# Patient Record
Sex: FEMALE | Race: WHITE | HISPANIC OR LATINO | URBAN - METROPOLITAN AREA
[De-identification: names, ages, dates, MRNs, and addresses within clinical notes are randomized per-mention and may not be internally consistent; named-entity substitution may affect disease eponyms.]

---

## 2021-02-25 ENCOUNTER — INPATIENT (INPATIENT)
Facility: HOSPITAL | Age: 81
LOS: 6 days | Discharge: NURSING, REHAB OR LONG-TERM CARE | DRG: 478 | End: 2021-03-04
Attending: SURGERY | Admitting: SURGERY
Payer: MEDICARE

## 2021-02-25 VITALS
HEART RATE: 90 BPM | RESPIRATION RATE: 18 BRPM | WEIGHT: 110.01 LBS | TEMPERATURE: 99 F | OXYGEN SATURATION: 96 % | DIASTOLIC BLOOD PRESSURE: 87 MMHG | SYSTOLIC BLOOD PRESSURE: 146 MMHG

## 2021-02-25 DIAGNOSIS — S72.001A FRACTURE OF UNSPECIFIED PART OF NECK OF RIGHT FEMUR, INITIAL ENCOUNTER FOR CLOSED FRACTURE: ICD-10-CM

## 2021-02-25 LAB
ALBUMIN SERPL ELPH-MCNC: 4.1 G/DL — SIGNIFICANT CHANGE UP (ref 3.3–5.2)
ALP SERPL-CCNC: 100 U/L — SIGNIFICANT CHANGE UP (ref 40–120)
ALT FLD-CCNC: 19 U/L — SIGNIFICANT CHANGE UP
ANION GAP SERPL CALC-SCNC: 11 MMOL/L — SIGNIFICANT CHANGE UP (ref 5–17)
APTT BLD: 29.2 SEC — SIGNIFICANT CHANGE UP (ref 27.5–35.5)
AST SERPL-CCNC: 31 U/L — SIGNIFICANT CHANGE UP
BASOPHILS # BLD AUTO: 0.12 K/UL — SIGNIFICANT CHANGE UP (ref 0–0.2)
BASOPHILS NFR BLD AUTO: 0.9 % — SIGNIFICANT CHANGE UP (ref 0–2)
BILIRUB SERPL-MCNC: <0.2 MG/DL — LOW (ref 0.4–2)
BUN SERPL-MCNC: 20 MG/DL — SIGNIFICANT CHANGE UP (ref 8–20)
CALCIUM SERPL-MCNC: 9.7 MG/DL — SIGNIFICANT CHANGE UP (ref 8.6–10.2)
CHLORIDE SERPL-SCNC: 106 MMOL/L — SIGNIFICANT CHANGE UP (ref 98–107)
CO2 SERPL-SCNC: 22 MMOL/L — SIGNIFICANT CHANGE UP (ref 22–29)
CREAT SERPL-MCNC: 0.65 MG/DL — SIGNIFICANT CHANGE UP (ref 0.5–1.3)
EOSINOPHIL # BLD AUTO: 0 K/UL — SIGNIFICANT CHANGE UP (ref 0–0.5)
EOSINOPHIL NFR BLD AUTO: 0 % — SIGNIFICANT CHANGE UP (ref 0–6)
GLUCOSE SERPL-MCNC: 125 MG/DL — HIGH (ref 70–99)
HCT VFR BLD CALC: 43.3 % — SIGNIFICANT CHANGE UP (ref 34.5–45)
HGB BLD-MCNC: 14 G/DL — SIGNIFICANT CHANGE UP (ref 11.5–15.5)
INR BLD: 1.02 RATIO — SIGNIFICANT CHANGE UP (ref 0.88–1.16)
LYMPHOCYTES # BLD AUTO: 0.57 K/UL — LOW (ref 1–3.3)
LYMPHOCYTES # BLD AUTO: 4.3 % — LOW (ref 13–44)
MANUAL SMEAR VERIFICATION: SIGNIFICANT CHANGE UP
MCHC RBC-ENTMCNC: 29.9 PG — SIGNIFICANT CHANGE UP (ref 27–34)
MCHC RBC-ENTMCNC: 32.3 GM/DL — SIGNIFICANT CHANGE UP (ref 32–36)
MCV RBC AUTO: 92.3 FL — SIGNIFICANT CHANGE UP (ref 80–100)
MONOCYTES # BLD AUTO: 0.68 K/UL — SIGNIFICANT CHANGE UP (ref 0–0.9)
MONOCYTES NFR BLD AUTO: 5.2 % — SIGNIFICANT CHANGE UP (ref 2–14)
MYELOCYTES NFR BLD: 0.9 % — HIGH (ref 0–0)
NEUTROPHILS # BLD AUTO: 11.56 K/UL — HIGH (ref 1.8–7.4)
NEUTROPHILS NFR BLD AUTO: 87.8 % — HIGH (ref 43–77)
PLAT MORPH BLD: NORMAL — SIGNIFICANT CHANGE UP
PLATELET # BLD AUTO: 218 K/UL — SIGNIFICANT CHANGE UP (ref 150–400)
POTASSIUM SERPL-MCNC: 3.9 MMOL/L — SIGNIFICANT CHANGE UP (ref 3.5–5.3)
POTASSIUM SERPL-SCNC: 3.9 MMOL/L — SIGNIFICANT CHANGE UP (ref 3.5–5.3)
PROT SERPL-MCNC: 7.1 G/DL — SIGNIFICANT CHANGE UP (ref 6.6–8.7)
PROTHROM AB SERPL-ACNC: 11.8 SEC — SIGNIFICANT CHANGE UP (ref 10.6–13.6)
RBC # BLD: 4.69 M/UL — SIGNIFICANT CHANGE UP (ref 3.8–5.2)
RBC # FLD: 13.2 % — SIGNIFICANT CHANGE UP (ref 10.3–14.5)
RBC BLD AUTO: NORMAL — SIGNIFICANT CHANGE UP
SARS-COV-2 RNA SPEC QL NAA+PROBE: SIGNIFICANT CHANGE UP
SODIUM SERPL-SCNC: 139 MMOL/L — SIGNIFICANT CHANGE UP (ref 135–145)
VARIANT LYMPHS # BLD: 0.9 % — SIGNIFICANT CHANGE UP (ref 0–6)
WBC # BLD: 13.17 K/UL — HIGH (ref 3.8–10.5)
WBC # FLD AUTO: 13.17 K/UL — HIGH (ref 3.8–10.5)

## 2021-02-25 PROCEDURE — 73502 X-RAY EXAM HIP UNI 2-3 VIEWS: CPT | Mod: 26,RT

## 2021-02-25 PROCEDURE — 71045 X-RAY EXAM CHEST 1 VIEW: CPT | Mod: 26

## 2021-02-25 PROCEDURE — 73552 X-RAY EXAM OF FEMUR 2/>: CPT | Mod: 26,RT

## 2021-02-25 PROCEDURE — 99221 1ST HOSP IP/OBS SF/LOW 40: CPT | Mod: AI,GC

## 2021-02-25 PROCEDURE — 99285 EMERGENCY DEPT VISIT HI MDM: CPT

## 2021-02-25 PROCEDURE — 99221 1ST HOSP IP/OBS SF/LOW 40: CPT

## 2021-02-25 PROCEDURE — 93010 ELECTROCARDIOGRAM REPORT: CPT

## 2021-02-25 RX ORDER — MORPHINE SULFATE 50 MG/1
2 CAPSULE, EXTENDED RELEASE ORAL ONCE
Refills: 0 | Status: DISCONTINUED | OUTPATIENT
Start: 2021-02-25 | End: 2021-02-25

## 2021-02-25 RX ORDER — SODIUM CHLORIDE 9 MG/ML
3 INJECTION INTRAMUSCULAR; INTRAVENOUS; SUBCUTANEOUS ONCE
Refills: 0 | Status: COMPLETED | OUTPATIENT
Start: 2021-02-25 | End: 2021-02-25

## 2021-02-25 RX ORDER — MORPHINE SULFATE 50 MG/1
2 CAPSULE, EXTENDED RELEASE ORAL EVERY 6 HOURS
Refills: 0 | Status: DISCONTINUED | OUTPATIENT
Start: 2021-02-25 | End: 2021-02-25

## 2021-02-25 RX ORDER — MORPHINE SULFATE 50 MG/1
4 CAPSULE, EXTENDED RELEASE ORAL ONCE
Refills: 0 | Status: DISCONTINUED | OUTPATIENT
Start: 2021-02-25 | End: 2021-02-25

## 2021-02-25 RX ORDER — IBUPROFEN 200 MG
600 TABLET ORAL ONCE
Refills: 0 | Status: COMPLETED | OUTPATIENT
Start: 2021-02-25 | End: 2021-02-25

## 2021-02-25 RX ORDER — TRAMADOL HYDROCHLORIDE 50 MG/1
50 TABLET ORAL EVERY 4 HOURS
Refills: 0 | Status: DISCONTINUED | OUTPATIENT
Start: 2021-02-25 | End: 2021-02-28

## 2021-02-25 RX ORDER — AMLODIPINE BESYLATE 2.5 MG/1
5 TABLET ORAL DAILY
Refills: 0 | Status: DISCONTINUED | OUTPATIENT
Start: 2021-02-25 | End: 2021-03-04

## 2021-02-25 RX ORDER — SENNA PLUS 8.6 MG/1
2 TABLET ORAL AT BEDTIME
Refills: 0 | Status: DISCONTINUED | OUTPATIENT
Start: 2021-02-25 | End: 2021-03-04

## 2021-02-25 RX ORDER — ACETAMINOPHEN 500 MG
650 TABLET ORAL ONCE
Refills: 0 | Status: COMPLETED | OUTPATIENT
Start: 2021-02-25 | End: 2021-02-25

## 2021-02-25 RX ORDER — NICOTINE POLACRILEX 2 MG
1 GUM BUCCAL DAILY
Refills: 0 | Status: DISCONTINUED | OUTPATIENT
Start: 2021-02-25 | End: 2021-02-27

## 2021-02-25 RX ORDER — SODIUM CHLORIDE 9 MG/ML
1000 INJECTION, SOLUTION INTRAVENOUS
Refills: 0 | Status: DISCONTINUED | OUTPATIENT
Start: 2021-02-25 | End: 2021-02-28

## 2021-02-25 RX ORDER — SIMVASTATIN 20 MG/1
20 TABLET, FILM COATED ORAL AT BEDTIME
Refills: 0 | Status: DISCONTINUED | OUTPATIENT
Start: 2021-02-25 | End: 2021-03-04

## 2021-02-25 RX ORDER — ACETAMINOPHEN 500 MG
650 TABLET ORAL EVERY 6 HOURS
Refills: 0 | Status: DISCONTINUED | OUTPATIENT
Start: 2021-02-25 | End: 2021-03-04

## 2021-02-25 RX ORDER — ONDANSETRON 8 MG/1
4 TABLET, FILM COATED ORAL EVERY 6 HOURS
Refills: 0 | Status: DISCONTINUED | OUTPATIENT
Start: 2021-02-25 | End: 2021-03-04

## 2021-02-25 RX ORDER — MORPHINE SULFATE 50 MG/1
2 CAPSULE, EXTENDED RELEASE ORAL EVERY 6 HOURS
Refills: 0 | Status: DISCONTINUED | OUTPATIENT
Start: 2021-02-25 | End: 2021-03-01

## 2021-02-25 RX ORDER — ENOXAPARIN SODIUM 100 MG/ML
30 INJECTION SUBCUTANEOUS ONCE
Refills: 0 | Status: COMPLETED | OUTPATIENT
Start: 2021-02-25 | End: 2021-02-25

## 2021-02-25 RX ORDER — ATENOLOL 25 MG/1
100 TABLET ORAL DAILY
Refills: 0 | Status: DISCONTINUED | OUTPATIENT
Start: 2021-02-25 | End: 2021-03-04

## 2021-02-25 RX ORDER — LIDOCAINE 4 G/100G
1 CREAM TOPICAL DAILY
Refills: 0 | Status: DISCONTINUED | OUTPATIENT
Start: 2021-02-25 | End: 2021-03-04

## 2021-02-25 RX ORDER — TRAMADOL HYDROCHLORIDE 50 MG/1
25 TABLET ORAL EVERY 4 HOURS
Refills: 0 | Status: DISCONTINUED | OUTPATIENT
Start: 2021-02-25 | End: 2021-02-25

## 2021-02-25 RX ADMIN — SODIUM CHLORIDE 100 MILLILITER(S): 9 INJECTION, SOLUTION INTRAVENOUS at 22:23

## 2021-02-25 RX ADMIN — ENOXAPARIN SODIUM 30 MILLIGRAM(S): 100 INJECTION SUBCUTANEOUS at 22:25

## 2021-02-25 RX ADMIN — Medication 650 MILLIGRAM(S): at 23:39

## 2021-02-25 RX ADMIN — SODIUM CHLORIDE 3 MILLILITER(S): 9 INJECTION INTRAMUSCULAR; INTRAVENOUS; SUBCUTANEOUS at 20:59

## 2021-02-25 RX ADMIN — MORPHINE SULFATE 2 MILLIGRAM(S): 50 CAPSULE, EXTENDED RELEASE ORAL at 20:58

## 2021-02-25 NOTE — H&P ADULT - HISTORY OF PRESENT ILLNESS
Patient is a 80 F with traumatic fall down 2 stairs at home. Landed on her right side and denies head trauma and LOC. Patient reports she was walking down her stairs and her shoes slipped causing her to spin over and fall. Denies hitting her head. Was unable to ambulate after the fall and thus son brought her to hospital. Pain at right hip when she moves her leg, otherwise no pain anywhere    A: Protected, patient conversing   B: CTAB. Symmetrical chest rise  C: 2+ central (femoral) & peripheral pulses (Radial, DP)  D: GCS 15, MAEO, interacting. No poncho disability noted  E: No gross deformities on primary exposure    Vitals:  Temp: 38.7  HR: 90 BP: 146/87 RR: 18    CXR: Negative for evidence of hemo/pneumothorax

## 2021-02-25 NOTE — ED ADULT NURSE NOTE - NSIMPLEMENTINTERV_GEN_ALL_ED
Implemented All Fall with Harm Risk Interventions:  [place] to call system. Call bell, personal items and telephone within reach. Instruct patient to call for assistance. Room bathroom lighting operational. Non-slip footwear when patient is off stretcher. Physically safe environment: no spills, clutter or unnecessary equipment. Stretcher in lowest position, wheels locked, appropriate side rails in place. Provide visual cue, wrist band, yellow gown, etc. Monitor gait and stability. Monitor for mental status changes and reorient to person, place, and time. Review medications for side effects contributing to fall risk. Reinforce activity limits and safety measures with patient and family. Provide visual clues: red socks.

## 2021-02-25 NOTE — H&P ADULT - NSHPPHYSICALEXAM_GEN_ALL_CORE
Constitutional: Well-developed well nourished Female in no acute distress  HEENT: Head is normocephalic and atraumatic, maxillofacial structures stable, no blood or discharge from nares or oral cavity, no lemus sign / racoon eyes, EOMI b/l, pupils [4]mm round and reactive to light b/l, no active drainage or redness  Neck: midline neck without swelling or tenderness  Respiratory: Breath sounds CTA b/l respirations are unlabored, no accessory muscle use, no conversational dyspnea  Cardiovascular: Regular rate & rhythm, +S1, S1, Chest wall is non-tender to palpation, no subQ emphysema or crepitus palpated  Gastrointestinal: Abdomen soft, non-tender, non-distended, no rebound tenderness / guarding, no ecchymosis or external signs of abdominal trauma  Musculoskeletal: moving all extremities spontaneously, tenderness of right hip without deformity noted to upper or lower extremities b/l  Pelvis: stable  Vascular: 2+ radial, femoral, and DP pulses b/l  Neurological: GCS: 15 (4/5/6). A&O x 3; no gross sensory / motor / coordination deficits  Musculoskeletal: 5/5 LUE/RUE/LLE. decreased ROM of LLE due to pain. but distal motor function intact 5/5  Neuropsinal: no C/T/LS spine tenderness to palpation, no step-offs or signs of external trauma to the back

## 2021-02-25 NOTE — H&P ADULT - NSHPLABSRESULTS_GEN_ALL_CORE
LABS:                          14.0   13.17 )-----------( 218      ( 25 Feb 2021 21:12 )             43.3     02-25    139  |  106  |  20.0  ----------------------------<  125<H>  3.9   |  22.0  |  0.65    Ca    9.7      25 Feb 2021 21:12    TPro  7.1  /  Alb  4.1  /  TBili  <0.2<L>  /  DBili  x   /  AST  31  /  ALT  19  /  AlkPhos  100  02-25    PT/INR - ( 25 Feb 2021 21:12 )   PT: 11.8 sec;   INR: 1.02 ratio         PTT - ( 25 Feb 2021 21:12 )  PTT:29.2 sec

## 2021-02-25 NOTE — CONSULT NOTE ADULT - SUBJECTIVE AND OBJECTIVE BOX
Pt Name: SOY TOVAR    MRN: [id number]      Patient is a 80y Female presenting to the emergency department with a chief complaint of right groin pain s/p mech fall at home, on ASA 81 daily. Pt was going down stairs with box of shoes, fell and landed on right side. Pt unable to bear weight after fall. Pt denies head trauma or LOC. Pt denies motorsensory changes distally. No other orthopedic complaints.    REVIEW OF SYSTEMS    General: Alert, responsive, in NAD    Skin/Breast: No rashes, no pruritis   	  Ophthalmologic: No visual changes. No redness.   	  ENMT:	No discharge. No swelling.    Respiratory and Thorax: No difficulty breathing. No cough.  	   Cardiovascular:	No chest pain. No palpitations.    Gastrointestinal:	 No abdominal pain. No diarrhea.     Genitourinary: No dysuria. No bleeding.    Musculoskeletal: SEE HPI.    Neurological: No sensory or motor changes.     Psychiatric: No anxiety or depression.    Hematology/Lymphatics: No swelling.    Endocrine: No Hx of diabetes.    ROS is otherwise negative.    PAST MEDICAL & SURGICAL HISTORY:  PAST MEDICAL & SURGICAL HISTORY:      Allergies: sulfa drugs (Unknown)      Medications:     FAMILY HISTORY:  : non-contributory    Social History:                         14.0   13.17 )-----------( 218      ( 25 Feb 2021 21:12 )             43.3       02-25    139  |  106  |  20.0  ----------------------------<  125<H>  3.9   |  22.0  |  0.65    Ca    9.7      25 Feb 2021 21:12    TPro  7.1  /  Alb  4.1  /  TBili  <0.2<L>  /  DBili  x   /  AST  31  /  ALT  19  /  AlkPhos  100  02-25      Vital Signs Last 24 Hrs  T(C): 37.1 (25 Feb 2021 19:35), Max: 37.1 (25 Feb 2021 19:35)  T(F): 98.7 (25 Feb 2021 19:35), Max: 98.7 (25 Feb 2021 19:35)  HR: 90 (25 Feb 2021 19:35) (90 - 90)  BP: 146/87 (25 Feb 2021 19:35) (146/87 - 146/87)  BP(mean): --  RR: 18 (25 Feb 2021 19:35) (18 - 18)  SpO2: 96% (25 Feb 2021 19:35) (96% - 96%)    Daily     Daily       PHYSICAL EXAM:      Appearance: Alert, responsive, in no acute distress.  Neurological: Sensation is grossly intact to light touch.   Skin: no rash on visible skin. Skin is clean, dry and intact. No bleeding. No abrasions. No ulcerations.  Vascular: 2+ distal pulses. Cap refill < 2 sec. No cyanosis. calf NT b/l  Right Lower Extremity: no obvious deformity. skin intact, no ecchymosis, +TTP right hip, +plantardorsiflexion, +ROM toes, BCR, no cyanosis, DP2+    A/P:  Pt is a  80y Female with found to have right hip IT fx    PLAN:   * NPO for OR tomorrow with Dr Owens  * IV fluids ordered and to start once NPO  * Pre-operative ABX ordered  * Bed rest  * Admit to Trauma team

## 2021-02-25 NOTE — ED PROVIDER NOTE - OBJECTIVE STATEMENT
Pt is a 81 y/o F w/PMHx HTN, HLD presents c/o fall.  Pt states she was walking down stairs and lost her footing at the last two steps.  She landed on her right side, denies LOC, denies hitting head.  Was able to crawl back upstairs and call her son.  Pt denies headache, chest pain, shortness of breath, abdominal pain.

## 2021-02-25 NOTE — ED ADULT NURSE NOTE - OBJECTIVE STATEMENT
Pt presents to ED s/p fall down 2 steps from a standing height. Pt c/o pain to R leg/hip area, +PMS in affected extremity noted. Pt reports allergy to ASA. Pt denies other complaints at this time.

## 2021-02-25 NOTE — ED PROVIDER NOTE - ATTENDING CONTRIBUTION TO CARE
81 yo F presents to ED via EMS s/p fall down 2 steps c/o R hip/groin pain.  Pt unable to walk or bear weight since fall.  No head injury, LOC or neck pain.  Pt denies any assoc CP, SOB, abd pain or syncope.  on exam pt awake and alert, appears uncomfortable, NC/AT, PERRL, throat clear mm moist, Neck supple, FROM, Cor Reg, lungs clear b/l, abd soft, NT, pelvis stable, RLE with exquisite pain on AROM, no shortening or ext rotation, NVI, + distal pulses, Neuro non-focal.  Pt most likely with hip/pelvis fx.  Will medicate for pain, check x-rays, ? CT and re-eval

## 2021-02-25 NOTE — H&P ADULT - ATTENDING COMMENTS
Pt seen and examined by me  agree with findings  Ortho consulted  CT A/P pending  admit  multimodal pain mgmt

## 2021-02-25 NOTE — ED ADULT NURSE NOTE - NSSUHOSCREENINGYN_ED_ALL_ED
Pt called back and states he was buying Viagara online 100mg with a quantity of 12.  Pt states he just had an OV with you and would like to see if he can get script from you.  pls advise. Thank you   Yes - the patient is able to be screened

## 2021-02-25 NOTE — H&P ADULT - ASSESSMENT
80 year old female s/p fall with right hip fracture with plan for orthopedic repair in AM.  - Admit to trauma surgery  - Pre-op labs and anesthesia clearance  - Ortho following with plan for OR in the morning  - Lovenox tonight, then hold for OR  - NPO at midnight, IVF  - resume home medications as indicated

## 2021-02-25 NOTE — ED ADULT TRIAGE NOTE - CHIEF COMPLAINT QUOTE
PT presents to ED s/p mechanical fall down 2 steps. Pain to RLE.  Denies hitting head or LOC. on 81mg ASA

## 2021-02-25 NOTE — ED PROVIDER NOTE - PHYSICAL EXAMINATION
General: well appearing, interactive, well nourished, NAD  HEENT: pupils equal and reactive, normal external ears bilaterally   Cardiac: RRR, no MRG appreciated  Resp: lungs clear to auscultation bilaterally, symmetric chest wall rise  Abd: soft, nontender, nondistended,   : no CVA tenderness  Neuro: Moving all extremities  Skin:  normal color for race  MSK: RLE active ROM limited due to pain, passive ROM to 40, no leg length discrepancy

## 2021-02-25 NOTE — ED PROVIDER NOTE - NS ED ROS FT
CONSTITUTIONAL: No fevers, no chills  Eyes: No vision changes  Cardiovascular: No Chest pain  Respiratory: No SOB  Gastrointestinal: No n/v/c/d, no abd pain  Genitourinary: no dysuria, no hematuria  SKIN: no rashes.  MSK: RLE pain  NEURO: no headache, no weakness, no numbness  PSYCHIATRIC: no SI/HI

## 2021-02-26 ENCOUNTER — TRANSCRIPTION ENCOUNTER (OUTPATIENT)
Age: 81
End: 2021-02-26

## 2021-02-26 LAB
A1C WITH ESTIMATED AVERAGE GLUCOSE RESULT: 5.5 % — SIGNIFICANT CHANGE UP (ref 4–5.6)
ABO RH CONFIRMATION: SIGNIFICANT CHANGE UP
ANION GAP SERPL CALC-SCNC: 10 MMOL/L — SIGNIFICANT CHANGE UP (ref 5–17)
BLD GP AB SCN SERPL QL: SIGNIFICANT CHANGE UP
BUN SERPL-MCNC: 18 MG/DL — SIGNIFICANT CHANGE UP (ref 8–20)
CALCIUM SERPL-MCNC: 9.6 MG/DL — SIGNIFICANT CHANGE UP (ref 8.6–10.2)
CHLORIDE SERPL-SCNC: 106 MMOL/L — SIGNIFICANT CHANGE UP (ref 98–107)
CO2 SERPL-SCNC: 27 MMOL/L — SIGNIFICANT CHANGE UP (ref 22–29)
CREAT SERPL-MCNC: 0.82 MG/DL — SIGNIFICANT CHANGE UP (ref 0.5–1.3)
ESTIMATED AVERAGE GLUCOSE: 111 MG/DL — SIGNIFICANT CHANGE UP (ref 68–114)
GLUCOSE SERPL-MCNC: 109 MG/DL — HIGH (ref 70–99)
HCT VFR BLD CALC: 39.7 % — SIGNIFICANT CHANGE UP (ref 34.5–45)
HGB BLD-MCNC: 12.8 G/DL — SIGNIFICANT CHANGE UP (ref 11.5–15.5)
MCHC RBC-ENTMCNC: 30 PG — SIGNIFICANT CHANGE UP (ref 27–34)
MCHC RBC-ENTMCNC: 32.2 GM/DL — SIGNIFICANT CHANGE UP (ref 32–36)
MCV RBC AUTO: 93 FL — SIGNIFICANT CHANGE UP (ref 80–100)
PLATELET # BLD AUTO: 200 K/UL — SIGNIFICANT CHANGE UP (ref 150–400)
POTASSIUM SERPL-MCNC: 4.2 MMOL/L — SIGNIFICANT CHANGE UP (ref 3.5–5.3)
POTASSIUM SERPL-SCNC: 4.2 MMOL/L — SIGNIFICANT CHANGE UP (ref 3.5–5.3)
RBC # BLD: 4.27 M/UL — SIGNIFICANT CHANGE UP (ref 3.8–5.2)
RBC # FLD: 13.3 % — SIGNIFICANT CHANGE UP (ref 10.3–14.5)
SARS-COV-2 IGM SERPL IA-ACNC: 0.07 INDEX — SIGNIFICANT CHANGE UP
SODIUM SERPL-SCNC: 143 MMOL/L — SIGNIFICANT CHANGE UP (ref 135–145)
WBC # BLD: 8.05 K/UL — SIGNIFICANT CHANGE UP (ref 3.8–10.5)
WBC # FLD AUTO: 8.05 K/UL — SIGNIFICANT CHANGE UP (ref 3.8–10.5)

## 2021-02-26 PROCEDURE — 93306 TTE W/DOPPLER COMPLETE: CPT | Mod: 26

## 2021-02-26 PROCEDURE — 99232 SBSQ HOSP IP/OBS MODERATE 35: CPT

## 2021-02-26 PROCEDURE — 74176 CT ABD & PELVIS W/O CONTRAST: CPT | Mod: 26

## 2021-02-26 RX ORDER — CEFAZOLIN SODIUM 1 G
2000 VIAL (EA) INJECTION ONCE
Refills: 0 | Status: DISCONTINUED | OUTPATIENT
Start: 2021-02-26 | End: 2021-02-27

## 2021-02-26 RX ORDER — LOSARTAN POTASSIUM 100 MG/1
100 TABLET, FILM COATED ORAL DAILY
Refills: 0 | Status: DISCONTINUED | OUTPATIENT
Start: 2021-02-26 | End: 2021-03-04

## 2021-02-26 RX ORDER — ENOXAPARIN SODIUM 100 MG/ML
40 INJECTION SUBCUTANEOUS ONCE
Refills: 0 | Status: COMPLETED | OUTPATIENT
Start: 2021-02-26 | End: 2021-02-26

## 2021-02-26 RX ADMIN — Medication 1 PATCH: at 12:24

## 2021-02-26 RX ADMIN — Medication 1 PATCH: at 19:42

## 2021-02-26 RX ADMIN — SIMVASTATIN 20 MILLIGRAM(S): 20 TABLET, FILM COATED ORAL at 21:49

## 2021-02-26 RX ADMIN — Medication 650 MILLIGRAM(S): at 05:27

## 2021-02-26 RX ADMIN — LIDOCAINE 1 PATCH: 4 CREAM TOPICAL at 21:42

## 2021-02-26 RX ADMIN — LIDOCAINE 1 PATCH: 4 CREAM TOPICAL at 23:28

## 2021-02-26 RX ADMIN — SENNA PLUS 2 TABLET(S): 8.6 TABLET ORAL at 21:49

## 2021-02-26 RX ADMIN — LIDOCAINE 1 PATCH: 4 CREAM TOPICAL at 12:23

## 2021-02-26 RX ADMIN — TRAMADOL HYDROCHLORIDE 50 MILLIGRAM(S): 50 TABLET ORAL at 03:05

## 2021-02-26 RX ADMIN — Medication 650 MILLIGRAM(S): at 17:49

## 2021-02-26 RX ADMIN — TRAMADOL HYDROCHLORIDE 50 MILLIGRAM(S): 50 TABLET ORAL at 17:49

## 2021-02-26 RX ADMIN — SODIUM CHLORIDE 100 MILLILITER(S): 9 INJECTION, SOLUTION INTRAVENOUS at 12:25

## 2021-02-26 RX ADMIN — ATENOLOL 100 MILLIGRAM(S): 25 TABLET ORAL at 05:27

## 2021-02-26 RX ADMIN — AMLODIPINE BESYLATE 5 MILLIGRAM(S): 2.5 TABLET ORAL at 05:27

## 2021-02-26 RX ADMIN — ENOXAPARIN SODIUM 40 MILLIGRAM(S): 100 INJECTION SUBCUTANEOUS at 18:48

## 2021-02-26 NOTE — PROGRESS NOTE ADULT - SUBJECTIVE AND OBJECTIVE BOX
INTERVAL HPI/OVERNIGHT EVENTS:    Patient seen this AM with no acute events overnight. Patient without any acute complaints at this time. Patients' pain is well controlled on current pain regiment. Remains NPO for OR today. Remains hemodynamically stable and denies fevers, chills. No CP or SOB       MEDICATIONS  (STANDING):  acetaminophen   Tablet .. 650 milliGRAM(s) Oral every 6 hours  amLODIPine   Tablet 5 milliGRAM(s) Oral daily  ATENolol  Tablet 100 milliGRAM(s) Oral daily  lidocaine   Patch 1 Patch Transdermal daily  multiple electrolytes Injection Type 1 1000 milliLiter(s) (100 mL/Hr) IV Continuous <Continuous>  nicotine   7 mG/24 Hr(s) Transdermal Patch -  Peds 1 Patch Transdermal daily  senna 2 Tablet(s) Oral at bedtime  simvastatin 20 milliGRAM(s) Oral at bedtime    MEDICATIONS  (PRN):  morphine  - Injectable 2 milliGRAM(s) IV Push every 6 hours PRN Breakthrough pain  ondansetron Injectable 4 milliGRAM(s) IV Push every 6 hours PRN Nausea  traMADol 25 milliGRAM(s) Oral every 4 hours PRN Moderate Pain (4 - 6)  traMADol 50 milliGRAM(s) Oral every 4 hours PRN Severe Pain (7 - 10)      Vital Signs Last 24 Hrs  T(C): 36.8 (26 Feb 2021 04:14), Max: 37.1 (25 Feb 2021 19:35)  T(F): 98.3 (26 Feb 2021 04:14), Max: 98.7 (25 Feb 2021 19:35)  HR: 74 (26 Feb 2021 04:14) (74 - 90)  BP: 160/68 (26 Feb 2021 04:14) (129/69 - 160/68)  BP(mean): --  RR: 18 (26 Feb 2021 04:14) (18 - 18)  SpO2: 94% (26 Feb 2021 04:14) (94% - 96%)    Physical Exam:  GEN: NAD, laying in bed, appears stated age  HEENT: NCAT, clear oral mucosa, normal conjunctiva  Chest: non-tender  CV:  non-tachycardic, 2+ radial pulse  Pulm: no increased work of breathing, no conversational dyspnea  GI: soft, non-tender  MSK: moving all extremities with pain on movement of RLE. no deformity. tender to palpation      I&O's Detail      LABS:                        14.0   13.17 )-----------( 218      ( 25 Feb 2021 21:12 )             43.3     02-25    139  |  106  |  20.0  ----------------------------<  125<H>  3.9   |  22.0  |  0.65    Ca    9.7      25 Feb 2021 21:12    TPro  7.1  /  Alb  4.1  /  TBili  <0.2<L>  /  DBili  x   /  AST  31  /  ALT  19  /  AlkPhos  100  02-25    PT/INR - ( 25 Feb 2021 21:12 )   PT: 11.8 sec;   INR: 1.02 ratio         PTT - ( 25 Feb 2021 21:12 )  PTT:29.2 sec      RADIOLOGY & ADDITIONAL STUDIES:

## 2021-02-26 NOTE — PRE PROCEDURE NOTE - PRE PROCEDURE EVALUATION
Preoperative Note    Preop Dx: Right intertrochanteric hip fracture  Surgeon: Sharif Owens MD  Procedure: TBD, but likely R hip hemiarthroplasty    Vital Signs Last 24 Hrs  T(C): 36.9 (26 Feb 2021 07:15), Max: 37.1 (25 Feb 2021 19:35)  T(F): 98.4 (26 Feb 2021 07:15), Max: 98.7 (25 Feb 2021 19:35)  HR: 59 (26 Feb 2021 07:15) (59 - 90)  BP: 165/67 (26 Feb 2021 07:15) (129/69 - 165/67)  BP(mean): --  RR: 19 (26 Feb 2021 07:15) (18 - 19)  SpO2: 96% (26 Feb 2021 07:15) (94% - 96%)                        12.8   8.05  )-----------( 200      ( 26 Feb 2021 07:08 )             39.7     02-26    143  |  106  |  18.0  ----------------------------<  109<H>  4.2   |  27.0  |  0.82    Ca    9.6      26 Feb 2021 07:08    TPro  7.1  /  Alb  4.1  /  TBili  <0.2<L>  /  DBili  x   /  AST  31  /  ALT  19  /  AlkPhos  100  02-25    PT/INR - ( 25 Feb 2021 21:12 )   PT: 11.8 sec;   INR: 1.02 ratio         PTT - ( 25 Feb 2021 21:12 )  PTT:29.2 sec  Daily     Daily     EKG: Reviewed on admission; no acute findings  CXR: Reviewed on admission; no gross abnormalities  Type and Screen: Drawn 2/26        A/P: 80y Female w/PMH of HTN and HLD, with smoking history, s/p fall down stairs with right intertrochanteric hip fx, to OR today with Orthopedic service.    - OR 2/26/21 for likely right hip hemiarthroplasty with Dr. Sharif Owens  - NPO past midnight, except medications  - IVF while NPO  - Morning Labs: CBC, BMP, coags, type & screen  - Cleared for OR by Trauma, Cardiology, Anethesia

## 2021-02-26 NOTE — CONSULT NOTE ADULT - SUBJECTIVE AND OBJECTIVE BOX
[facility]                                                    [address] [facility], [address], [place] [address]                                                         PHONE: [phone]    FAX: [phone] [address] [facility], [address], [place] [address]                                                 PHONE: [phone]    FAX: [phone]  *******************************************************************************  cc: pre op    HPI: 80F with mechanical fall and right femur intertochanteric fracture. Pt denies active chest pain.  There is no SOB. No palpitations, PND or orthopnea. No dizziness or syncope reported. No fever, chills or constitutional symptoms.      Overnight events/Subjective Assessment:    INTERPRETATION OF TELEMETRY (personally reviewed):    PAST MEDICAL & SURGICAL HISTORY:      penicillins (Unknown)  sulfa drugs (Unknown)      MEDICATIONS  (STANDING):  acetaminophen   Tablet .. 650 milliGRAM(s) Oral every 6 hours  amLODIPine   Tablet 5 milliGRAM(s) Oral daily  ATENolol  Tablet 100 milliGRAM(s) Oral daily  lidocaine   Patch 1 Patch Transdermal daily  multiple electrolytes Injection Type 1 1000 milliLiter(s) (100 mL/Hr) IV Continuous <Continuous>  nicotine   7 mG/24 Hr(s) Transdermal Patch -  Peds 1 Patch Transdermal daily  senna 2 Tablet(s) Oral at bedtime  simvastatin 20 milliGRAM(s) Oral at bedtime    MEDICATIONS  (PRN):  morphine  - Injectable 2 milliGRAM(s) IV Push every 6 hours PRN Breakthrough pain  ondansetron Injectable 4 milliGRAM(s) IV Push every 6 hours PRN Nausea  traMADol 25 milliGRAM(s) Oral every 4 hours PRN Moderate Pain (4 - 6)  traMADol 50 milliGRAM(s) Oral every 4 hours PRN Severe Pain (7 - 10)      Vital Signs Last 24 Hrs  T(C): 36.8 (26 Feb 2021 04:14), Max: 37.1 (25 Feb 2021 19:35)  T(F): 98.3 (26 Feb 2021 04:14), Max: 98.7 (25 Feb 2021 19:35)  HR: 74 (26 Feb 2021 04:14) (74 - 90)  BP: 160/68 (26 Feb 2021 04:14) (129/69 - 160/68)  BP(mean): --  RR: 18 (26 Feb 2021 04:14) (18 - 18)  SpO2: 94% (26 Feb 2021 04:14) (94% - 96%)    I&O's Detail    I&O's Summary          PHYSICAL EXAM:  General: Appears well developed, well nourished, no acute distress. not in acute pain  HEAD: normal cephalic. Atraumatic  PUPILS: equal and reactive to light  EARS: normal hearing  NECK: supple. no JVD or HJR. no carotid bruits. no visible lymphadenopathy  NOSE: no gross abnormalities  CHEST: symmetric chest wall expansion  CARDIOVASCULAR: Normal rate. Regular rhythm. Normal S1 and S2, no S3/S4,  no murmur, rub, or gallop  LUNGS: Normal effort. Normal respiratory rate. Breath sounds are clear to auscultation bilaterally. No respiratory distress. No stridor.  no rales, rhonchi or wheeze. no decreased Breath sounds  ABDOMEN: Soft, nontender, non-distended, positive bowel sounds, no mass or bruit. no abdominal tenderness. No rebound. no ascites  EXTREMITIES: No clubbing, cyanosis or edema. normal range of motion  PULSES:  distal pulses WNL  SKIN: Warm and dry with normal turgor. no visible rash or cyanosis   NEURO: Alert & oriented x 3, grossly intact with no focal weakness  PSYCH: normal mood and affect. Grossly normal insight and judgement exhibited    FAMILY HISTORY:      SOCIAL HISTORY:   active smoking. No ETOH/No IVDA    REVIEW OF SYSTEMS:  Constitutional: no fever, chills or malaise. No weight loss  Head: no trauma  Eyes: no visual deficit. No double vision  Ears: no hearing deficit or ringing in the ears  Nose: no nose bleeds or smell changes or congestion  Throat: no difficult swallowing or painful swallowing  Neck: supple. No lymphadenopathy or swelling  Respiratory: no SOB, wheeze, asthma, COPD. No cough. No blood in the sputum  Cardiovascular: no CP, palpitations, irregular heart beats. No edema. No PND. No orthopnea. No skin/temperature or color changes  Gastrointestinal: no abdominal pain. No constipation. No diarrhea. No melena. No nausea. No vomiting. No bloating  Genitourinary: no frequency or urgency. No hematuria  Lymphatics: no grossly swollen lymph nodes  Musculoskeletal: no limitation of range of motion. Normal strength. No pain  Integumentary: no visible rash. No itching  Neurologic: no HA. No TIA or stroke symptoms. No seizure. No hx of epilepsy. No tingling or numbness. No weakness. No dizziness  Psychiatric: denied. Reports appropriate mood.        LABS:                        14.0   13.17 )-----------( 218      ( 25 Feb 2021 21:12 )             43.3     02-25    139  |  106  |  20.0  ----------------------------<  125<H>  3.9   |  22.0  |  0.65    Ca    9.7      25 Feb 2021 21:12    TPro  7.1  /  Alb  4.1  /  TBili  <0.2<L>  /  DBili  x   /  AST  31  /  ALT  19  /  AlkPhos  100  02-25        PT/INR - ( 25 Feb 2021 21:12 )   PT: 11.8 sec;   INR: 1.02 ratio         PTT - ( 25 Feb 2021 21:12 )  PTT:29.2 sec  serum  Lipids:         RADIOLOGY & ADDITIONAL STUDIES:    ECG: SR 60 LAD LAE no acute changes    < from: CT Abdomen and Pelvis No Cont (02.26.21 @ 01:52) >  INTERPRETATION:  Right femur intertrochanteric fracture. No evidence of acute intra-abdominal/pelvic injury; limited by lack of iv contrast.    < end of copied text >      ASSESSMENT AND PLAN:  In summary, SOY TOVAR is a 80y Female with past medical history significant for       Jessica Flynn MD                                                               [facility], [facility]                                                    [address] [facility] [address], [address], [place] [address]                                                         PHONE: [phone]    FAX: [phone] [address] [facility] [address], [address], [place] [address]                                                 PHONE: [phone]    FAX: [phone]  *******************************************************************************  cc: pre op    HPI: 80F with mechanical fall down 2 cellar stairs with right femur intertochanteric fracture. Pt denies LOC. Pt denies head trauma. Pt reports slip and fall.  Pt denies active chest pain.  There is no SOB. No palpitations, PND or orthopnea. No dizziness or syncope reported. No fever, chills or constitutional symptoms. Pt is an active smoker with HTN and HL. Recent negative ischemic evaluation. No documented CAD. No hx of coronary stents or coronary interventions.    - Exercise treadmill stress test 12/11/20 Deconditioned  exercise response (4:39 Matthew protocol) achieving an adequate HR with no evidence of stress induced ischemia  - Echo 12/10/20 EF 70-75%. diastolic dysfunction, mild MR/TR  - Carotid 12/10/20 16-49% bilaterally    Overnight events/Subjective Assessment: right hip pain      PAST MEDICAL & SURGICAL HISTORY:      penicillins (Unknown)  sulfa drugs (Unknown)      MEDICATIONS  (STANDING):  acetaminophen   Tablet .. 650 milliGRAM(s) Oral every 6 hours  amLODIPine   Tablet 5 milliGRAM(s) Oral daily  ATENolol  Tablet 100 milliGRAM(s) Oral daily  lidocaine   Patch 1 Patch Transdermal daily  multiple electrolytes Injection Type 1 1000 milliLiter(s) (100 mL/Hr) IV Continuous <Continuous>  nicotine   7 mG/24 Hr(s) Transdermal Patch -  Peds 1 Patch Transdermal daily  senna 2 Tablet(s) Oral at bedtime  simvastatin 20 milliGRAM(s) Oral at bedtime    MEDICATIONS  (PRN):  morphine  - Injectable 2 milliGRAM(s) IV Push every 6 hours PRN Breakthrough pain  ondansetron Injectable 4 milliGRAM(s) IV Push every 6 hours PRN Nausea  traMADol 25 milliGRAM(s) Oral every 4 hours PRN Moderate Pain (4 - 6)  traMADol 50 milliGRAM(s) Oral every 4 hours PRN Severe Pain (7 - 10)      Vital Signs Last 24 Hrs  T(C): 36.8 (26 Feb 2021 04:14), Max: 37.1 (25 Feb 2021 19:35)  T(F): 98.3 (26 Feb 2021 04:14), Max: 98.7 (25 Feb 2021 19:35)  HR: 74 (26 Feb 2021 04:14) (74 - 90)  BP: 160/68 (26 Feb 2021 04:14) (129/69 - 160/68)  BP(mean): --  RR: 18 (26 Feb 2021 04:14) (18 - 18)  SpO2: 94% (26 Feb 2021 04:14) (94% - 96%)    I&O's Detail    I&O's Summary          PHYSICAL EXAM:  General: Appears well developed, well nourished, no acute distress. not in acute pain  HEAD: normal cephalic. Atraumatic  PUPILS: equal and reactive to light  EARS: normal hearing  NECK: supple. no JVD or HJR. no carotid bruits. no visible lymphadenopathy  NOSE: no gross abnormalities  CHEST: symmetric chest wall expansion  CARDIOVASCULAR: Normal rate. Regular rhythm. Normal S1 and S2, no S3/S4,  no murmur, rub, or gallop  LUNGS: Normal effort. Normal respiratory rate. Breath sounds are clear to auscultation bilaterally. No respiratory distress. No stridor.  no rales, rhonchi or wheeze. no decreased Breath sounds  ABDOMEN: Soft, nontender, non-distended, positive bowel sounds, no mass or bruit. no abdominal tenderness. No rebound. no ascites  EXTREMITIES: No clubbing, cyanosis or edema. normal range of motion  PULSES:  distal pulses WNL  SKIN: Warm and dry with normal turgor. no visible rash or cyanosis   NEURO: Alert & oriented x 3, grossly intact with no focal weakness  PSYCH: normal mood and affect. Grossly normal insight and judgement exhibited    FAMILY HISTORY: Pt denies family hx of ischemic heart disease for mother, father or 1st degree relatives      SOCIAL HISTORY:   active smoking. No ETOH/No IVDA    REVIEW OF SYSTEMS:  Constitutional: no fever, chills or malaise. No weight loss  Head: no trauma  Eyes: no visual deficit. No double vision  Ears: no hearing deficit or ringing in the ears  Nose: no nose bleeds or smell changes or congestion  Throat: no difficult swallowing or painful swallowing  Neck: supple. No lymphadenopathy or swelling  Respiratory: no SOB, wheeze, asthma, COPD. No cough. No blood in the sputum  Cardiovascular: no CP, palpitations, irregular heart beats. No edema. No PND. No orthopnea. No skin/temperature or color changes  Gastrointestinal: no abdominal pain. No constipation. No diarrhea. No melena. No nausea. No vomiting. No bloating  Genitourinary: no frequency or urgency. No hematuria  Lymphatics: no grossly swollen lymph nodes  Musculoskeletal: no limitation of range of motion. Normal strength. + right hip pain  Integumentary: no visible rash. No itching  Neurologic: no HA. No TIA or stroke symptoms. No seizure. No hx of epilepsy. No tingling or numbness. No weakness. No dizziness  Psychiatric: denied. Reports appropriate mood.        LABS:                        14.0   13.17 )-----------( 218      ( 25 Feb 2021 21:12 )             43.3     02-25    139  |  106  |  20.0  ----------------------------<  125<H>  3.9   |  22.0  |  0.65    Ca    9.7      25 Feb 2021 21:12    TPro  7.1  /  Alb  4.1  /  TBili  <0.2<L>  /  DBili  x   /  AST  31  /  ALT  19  /  AlkPhos  100  02-25        PT/INR - ( 25 Feb 2021 21:12 )   PT: 11.8 sec;   INR: 1.02 ratio         PTT - ( 25 Feb 2021 21:12 )  PTT:29.2 sec  serum  Lipids:         RADIOLOGY & ADDITIONAL STUDIES:    ECG: SR 60 LAD LAE no acute changes    < from: CT Abdomen and Pelvis No Cont (02.26.21 @ 01:52) >  INTERPRETATION:  Right femur intertrochanteric fracture. No evidence of acute intra-abdominal/pelvic injury; limited by lack of iv contrast.    < end of copied text >      ASSESSMENT AND PLAN:  In summary, SOY TOVAR is a 80y Female with past medical history significant for mechanical fall down 2 cellar stairs with right femur intertochanteric fracture. Pt denies LOC. Pt denies head trauma. Pt reports slip and fall.  Pt denies active chest pain.  There is no SOB. No palpitations, PND or orthopnea. No dizziness or syncope reported. No fever, chills or constitutional symptoms. Pt is an active smoker with HTN and HL. Recent negative ischemic evaluation. No documented CAD. No hx of coronary stents or coronary interventions.    - Exercise treadmill stress test 12/11/20 Deconditioned  exercise response (4:39 Matthew protocol) achieving an adequate HR with no evidence of stress induced ischemia  - Echo 12/10/20 EF 70-75%. diastolic dysfunction, mild MR/TR  - Carotid 12/10/20 16-49% bilaterally  - No active CP or sx to suggest ACS or hemodynamic compromise. Normal LV function. Recent negative ischemic evaluation. There is no clear cut cardiac contraindication to proceeding with the proposed procedure from the cardiac standpoint and pt is cleared to proceed  - HTN. BP is uncontrolled.  Maintain outpt dosing of atenolol 100mg daily, losartan 100mg daily and amlodipine 5mg daily. Pt currently on atenolol and amlodipine in house. Will resume outpt dosing of losartan. Pain may also be driving elevation in BP  - HL. simvastatin 20mg daily is maintained as an outpt  - Bilateral carotid disease. MIld to moderate carotid disease. Resume ASA post procedure and maintain simvastatin. Outpt fu of carotid disease via our office.  - Nicotine dependence. Active smoker. Smoking cessation has been dw the patient. Pt is on nicotine patch in hospital.  - Pain management as per primary team.  - ECG personally reviewed by me. no acute ECG changes  - radiologic imaging reviewed  - Laboratory data reviewed.  - I have personally reviewed all obtainable prior records and data/office records  - Please reconsult PRN if any new CV issues should arise    Thank you for allowing me to participate in the care of your patient    Jessica Flynn MD                                                               [facility], [facility]                                                    [address], [address], [place] [address]                                                         PHONE: [phone]    FAX: [phone] [address], [address], [place] [address]                                                 PHONE: [phone]    FAX: [phone]  *******************************************************************************  cc: pre op    HPI: 80F with mechanical fall down 2 cellar stairs with right femur intertochanteric fracture. Pt denies LOC. Pt denies head trauma. Pt reports slip and fall.  Pt denies active chest pain.  There is no SOB. No palpitations, PND or orthopnea. No dizziness or syncope reported. No fever, chills or constitutional symptoms. Pt is an active smoker with HTN and HL. Recent negative ischemic evaluation. No documented CAD. No hx of coronary stents or coronary interventions.    - Exercise treadmill stress test 12/11/20 Deconditioned  exercise response (4:39 Matthew protocol) achieving an adequate HR with no evidence of stress induced ischemia  - Echo 12/10/20 EF 70-75%. diastolic dysfunction, mild MR/TR  - Carotid 12/10/20 16-49% bilaterally    Overnight events/Subjective Assessment: right hip pain      PAST MEDICAL & SURGICAL HISTORY:      penicillins (Unknown)  sulfa drugs (Unknown)      MEDICATIONS  (STANDING):  acetaminophen   Tablet .. 650 milliGRAM(s) Oral every 6 hours  amLODIPine   Tablet 5 milliGRAM(s) Oral daily  ATENolol  Tablet 100 milliGRAM(s) Oral daily  lidocaine   Patch 1 Patch Transdermal daily  multiple electrolytes Injection Type 1 1000 milliLiter(s) (100 mL/Hr) IV Continuous <Continuous>  nicotine   7 mG/24 Hr(s) Transdermal Patch -  Peds 1 Patch Transdermal daily  senna 2 Tablet(s) Oral at bedtime  simvastatin 20 milliGRAM(s) Oral at bedtime    MEDICATIONS  (PRN):  morphine  - Injectable 2 milliGRAM(s) IV Push every 6 hours PRN Breakthrough pain  ondansetron Injectable 4 milliGRAM(s) IV Push every 6 hours PRN Nausea  traMADol 25 milliGRAM(s) Oral every 4 hours PRN Moderate Pain (4 - 6)  traMADol 50 milliGRAM(s) Oral every 4 hours PRN Severe Pain (7 - 10)      Vital Signs Last 24 Hrs  T(C): 36.8 (26 Feb 2021 04:14), Max: 37.1 (25 Feb 2021 19:35)  T(F): 98.3 (26 Feb 2021 04:14), Max: 98.7 (25 Feb 2021 19:35)  HR: 74 (26 Feb 2021 04:14) (74 - 90)  BP: 160/68 (26 Feb 2021 04:14) (129/69 - 160/68)  BP(mean): --  RR: 18 (26 Feb 2021 04:14) (18 - 18)  SpO2: 94% (26 Feb 2021 04:14) (94% - 96%)    I&O's Detail    I&O's Summary          PHYSICAL EXAM:  General: Appears well developed, well nourished, no acute distress. not in acute pain  HEAD: normal cephalic. Atraumatic  PUPILS: equal and reactive to light  EARS: normal hearing  NECK: supple. no JVD or HJR. no carotid bruits. no visible lymphadenopathy  NOSE: no gross abnormalities  CHEST: symmetric chest wall expansion  CARDIOVASCULAR: Normal rate. Regular rhythm. Normal S1 and S2, no S3/S4,  no murmur, rub, or gallop  LUNGS: Normal effort. Normal respiratory rate. Breath sounds are clear to auscultation bilaterally. No respiratory distress. No stridor.  no rales, rhonchi or wheeze. no decreased Breath sounds  ABDOMEN: Soft, nontender, non-distended, positive bowel sounds, no mass or bruit. no abdominal tenderness. No rebound. no ascites  EXTREMITIES: No clubbing, cyanosis or edema. normal range of motion  PULSES:  distal pulses WNL  SKIN: Warm and dry with normal turgor. no visible rash or cyanosis   NEURO: Alert & oriented x 3, grossly intact with no focal weakness  PSYCH: normal mood and affect. Grossly normal insight and judgement exhibited    FAMILY HISTORY: Pt denies family hx of ischemic heart disease for mother, father or 1st degree relatives      SOCIAL HISTORY:   active smoking. No ETOH/No IVDA    REVIEW OF SYSTEMS:  Constitutional: no fever, chills or malaise. No weight loss  Head: no trauma  Eyes: no visual deficit. No double vision  Ears: no hearing deficit or ringing in the ears  Nose: no nose bleeds or smell changes or congestion  Throat: no difficult swallowing or painful swallowing  Neck: supple. No lymphadenopathy or swelling  Respiratory: no SOB, wheeze, asthma, COPD. No cough. No blood in the sputum  Cardiovascular: no CP, palpitations, irregular heart beats. No edema. No PND. No orthopnea. No skin/temperature or color changes  Gastrointestinal: no abdominal pain. No constipation. No diarrhea. No melena. No nausea. No vomiting. No bloating  Genitourinary: no frequency or urgency. No hematuria  Lymphatics: no grossly swollen lymph nodes  Musculoskeletal: no limitation of range of motion. Normal strength. + right hip pain  Integumentary: no visible rash. No itching  Neurologic: no HA. No TIA or stroke symptoms. No seizure. No hx of epilepsy. No tingling or numbness. No weakness. No dizziness  Psychiatric: denied. Reports appropriate mood.        LABS:                        14.0   13.17 )-----------( 218      ( 25 Feb 2021 21:12 )             43.3     02-25    139  |  106  |  20.0  ----------------------------<  125<H>  3.9   |  22.0  |  0.65    Ca    9.7      25 Feb 2021 21:12    TPro  7.1  /  Alb  4.1  /  TBili  <0.2<L>  /  DBili  x   /  AST  31  /  ALT  19  /  AlkPhos  100  02-25        PT/INR - ( 25 Feb 2021 21:12 )   PT: 11.8 sec;   INR: 1.02 ratio         PTT - ( 25 Feb 2021 21:12 )  PTT:29.2 sec  serum  Lipids:         RADIOLOGY & ADDITIONAL STUDIES:    ECG: SR 60 LAD LAE no acute changes    < from: CT Abdomen and Pelvis No Cont (02.26.21 @ 01:52) >  INTERPRETATION:  Right femur intertrochanteric fracture. No evidence of acute intra-abdominal/pelvic injury; limited by lack of iv contrast.    < end of copied text >      ASSESSMENT AND PLAN:  In summary, SOY TOVAR is a 80y Female with past medical history significant for mechanical fall down 2 cellar stairs with right femur intertochanteric fracture. Pt denies LOC. Pt denies head trauma. Pt reports slip and fall.  Pt denies active chest pain.  There is no SOB. No palpitations, PND or orthopnea. No dizziness or syncope reported. No fever, chills or constitutional symptoms. Pt is an active smoker with HTN and HL. Recent negative ischemic evaluation. No documented CAD. No hx of coronary stents or coronary interventions.    - Exercise treadmill stress test 12/11/20 Deconditioned  exercise response (4:39 Matthew protocol) achieving an adequate HR with no evidence of stress induced ischemia  - Echo 12/10/20 EF 70-75%. diastolic dysfunction, mild MR/TR  - Carotid 12/10/20 16-49% bilaterally  - No active CP or sx to suggest ACS or hemodynamic compromise. Normal LV function. Recent negative ischemic evaluation. There is no clear cut cardiac contraindication to proceeding with the proposed procedure from the cardiac standpoint and pt is cleared to proceed  - HTN. BP is uncontrolled.  Maintain outpt dosing of atenolol 100mg daily, losartan 100mg daily and amlodipine 5mg daily. Pt currently on atenolol and amlodipine in house. Will resume outpt dosing of losartan. Pain may also be driving elevation in BP  - HL. simvastatin 20mg daily is maintained as an outpt  - Bilateral carotid disease. MIld to moderate carotid disease. Resume ASA post procedure and maintain simvastatin. Outpt fu of carotid disease via our office.  - Nicotine dependence. Active smoker. Smoking cessation has been dw the patient. Pt is on nicotine patch in hospital.  - Pain management as per primary team.  - DVT prophylaxis as per primary team  - ECG personally reviewed by me. no acute ECG changes  - radiologic imaging reviewed  - Laboratory data reviewed.  - I have personally reviewed all obtainable prior records and data/office records  - Please reconsult PRN if any new CV issues should arise    Thank you for allowing me to participate in the care of your patient    Jessica Flynn MD                                                               [facility], [facility]                                                    [address], [address], [place] [address]                                                         PHONE: [phone]    FAX: [phone] [address], [address], [place] [address]                                                 PHONE: [phone]    FAX: [phone]  *******************************************************************************  cc: pre op    HPI: 80F with mechanical fall down 2 cellar stairs with right femur intertochanteric fracture. Pt denies LOC. Pt denies head trauma. Pt reports slip and fall.  Pt denies active chest pain.  There is no SOB. No palpitations, PND or orthopnea. No dizziness or syncope reported. No fever, chills or constitutional symptoms. Pt is an active smoker with HTN and HL. Recent negative ischemic evaluation. No documented CAD. No hx of coronary stents or coronary interventions.    - Exercise treadmill stress test 12/11/20 Deconditioned  exercise response (4:39 Matthew protocol) achieving an adequate HR with no evidence of stress induced ischemia  - Echo 12/10/20 EF 70-75%. diastolic dysfunction, mild MR/TR  - Carotid 12/10/20 16-49% bilaterally    Overnight events/Subjective Assessment: right hip pain      PAST MEDICAL & SURGICAL HISTORY: HTN, HL, bilateral carotid disease      penicillins (Unknown)  sulfa drugs (Unknown)      MEDICATIONS  (STANDING):  acetaminophen   Tablet .. 650 milliGRAM(s) Oral every 6 hours  amLODIPine   Tablet 5 milliGRAM(s) Oral daily  ATENolol  Tablet 100 milliGRAM(s) Oral daily  lidocaine   Patch 1 Patch Transdermal daily  multiple electrolytes Injection Type 1 1000 milliLiter(s) (100 mL/Hr) IV Continuous <Continuous>  nicotine   7 mG/24 Hr(s) Transdermal Patch -  Peds 1 Patch Transdermal daily  senna 2 Tablet(s) Oral at bedtime  simvastatin 20 milliGRAM(s) Oral at bedtime    MEDICATIONS  (PRN):  morphine  - Injectable 2 milliGRAM(s) IV Push every 6 hours PRN Breakthrough pain  ondansetron Injectable 4 milliGRAM(s) IV Push every 6 hours PRN Nausea  traMADol 25 milliGRAM(s) Oral every 4 hours PRN Moderate Pain (4 - 6)  traMADol 50 milliGRAM(s) Oral every 4 hours PRN Severe Pain (7 - 10)      Vital Signs Last 24 Hrs  T(C): 36.8 (26 Feb 2021 04:14), Max: 37.1 (25 Feb 2021 19:35)  T(F): 98.3 (26 Feb 2021 04:14), Max: 98.7 (25 Feb 2021 19:35)  HR: 74 (26 Feb 2021 04:14) (74 - 90)  BP: 160/68 (26 Feb 2021 04:14) (129/69 - 160/68)  BP(mean): --  RR: 18 (26 Feb 2021 04:14) (18 - 18)  SpO2: 94% (26 Feb 2021 04:14) (94% - 96%)    I&O's Detail    I&O's Summary          PHYSICAL EXAM:  General: Appears well developed, well nourished, no acute distress. not in acute pain  HEAD: normal cephalic. Atraumatic  PUPILS: equal and reactive to light  EARS: normal hearing  NECK: supple. no JVD or HJR. no carotid bruits. no visible lymphadenopathy  NOSE: no gross abnormalities  CHEST: symmetric chest wall expansion  CARDIOVASCULAR: Normal rate. Regular rhythm. Normal S1 and S2, no S3/S4,  no murmur, rub, or gallop  LUNGS: Normal effort. Normal respiratory rate. Breath sounds are clear to auscultation bilaterally. No respiratory distress. No stridor.  no rales, rhonchi or wheeze. no decreased Breath sounds  ABDOMEN: Soft, nontender, non-distended, positive bowel sounds, no mass or bruit. no abdominal tenderness. No rebound. no ascites  EXTREMITIES: No clubbing, cyanosis or edema. normal range of motion  PULSES:  distal pulses WNL  SKIN: Warm and dry with normal turgor. no visible rash or cyanosis   NEURO: Alert & oriented x 3, grossly intact with no focal weakness  PSYCH: normal mood and affect. Grossly normal insight and judgement exhibited    FAMILY HISTORY: Pt denies family hx of ischemic heart disease for mother, father or 1st degree relatives      SOCIAL HISTORY:   active smoking. No ETOH/No IVDA    REVIEW OF SYSTEMS:  Constitutional: no fever, chills or malaise. No weight loss  Head: no trauma  Eyes: no visual deficit. No double vision  Ears: no hearing deficit or ringing in the ears  Nose: no nose bleeds or smell changes or congestion  Throat: no difficult swallowing or painful swallowing  Neck: supple. No lymphadenopathy or swelling  Respiratory: no SOB, wheeze, asthma, COPD. No cough. No blood in the sputum  Cardiovascular: no CP, palpitations, irregular heart beats. No edema. No PND. No orthopnea. No skin/temperature or color changes  Gastrointestinal: no abdominal pain. No constipation. No diarrhea. No melena. No nausea. No vomiting. No bloating  Genitourinary: no frequency or urgency. No hematuria  Lymphatics: no grossly swollen lymph nodes  Musculoskeletal: no limitation of range of motion. Normal strength. + right hip pain  Integumentary: no visible rash. No itching  Neurologic: no HA. No TIA or stroke symptoms. No seizure. No hx of epilepsy. No tingling or numbness. No weakness. No dizziness  Psychiatric: denied. Reports appropriate mood.        LABS:                        14.0   13.17 )-----------( 218      ( 25 Feb 2021 21:12 )             43.3     02-25    139  |  106  |  20.0  ----------------------------<  125<H>  3.9   |  22.0  |  0.65    Ca    9.7      25 Feb 2021 21:12    TPro  7.1  /  Alb  4.1  /  TBili  <0.2<L>  /  DBili  x   /  AST  31  /  ALT  19  /  AlkPhos  100  02-25        PT/INR - ( 25 Feb 2021 21:12 )   PT: 11.8 sec;   INR: 1.02 ratio         PTT - ( 25 Feb 2021 21:12 )  PTT:29.2 sec  serum  Lipids:         RADIOLOGY & ADDITIONAL STUDIES:    ECG: SR 60 LAD LAE no acute changes    < from: CT Abdomen and Pelvis No Cont (02.26.21 @ 01:52) >  INTERPRETATION:  Right femur intertrochanteric fracture. No evidence of acute intra-abdominal/pelvic injury; limited by lack of iv contrast.    < end of copied text >      ASSESSMENT AND PLAN:  In summary, SOY TOVAR is a 80y Female with past medical history significant for mechanical fall down 2 cellar stairs with right femur intertochanteric fracture. Pt denies LOC. Pt denies head trauma. Pt reports slip and fall.  Pt denies active chest pain.  There is no SOB. No palpitations, PND or orthopnea. No dizziness or syncope reported. No fever, chills or constitutional symptoms. Pt is an active smoker with HTN and HL. Recent negative ischemic evaluation. No documented CAD. No hx of coronary stents or coronary interventions.    - Exercise treadmill stress test 12/11/20 Deconditioned  exercise response (4:39 Matthew protocol) achieving an adequate HR with no evidence of stress induced ischemia  - Echo 12/10/20 EF 70-75%. diastolic dysfunction, mild MR/TR  - Carotid 12/10/20 16-49% bilaterally  - No active CP or sx to suggest ACS or hemodynamic compromise. Normal LV function. Recent negative ischemic evaluation. There is no clear cut cardiac contraindication to proceeding with the proposed procedure from the cardiac standpoint and pt is cleared to proceed  - HTN. BP is uncontrolled.  Maintain outpt dosing of atenolol 100mg daily, losartan 100mg daily and amlodipine 5mg daily. Pt currently on atenolol and amlodipine in house. Will resume outpt dosing of losartan. Pain may also be driving elevation in BP  - HL. simvastatin 20mg daily is maintained as an outpt  - Bilateral carotid disease. MIld to moderate carotid disease. Resume ASA post procedure and maintain simvastatin. Outpt fu of carotid disease via our office.  - Nicotine dependence. Active smoker. Smoking cessation has been dw the patient. Pt is on nicotine patch in hospital.  - Pain management as per primary team.  - DVT prophylaxis as per primary team  - ECG personally reviewed by me. no acute ECG changes  - radiologic imaging reviewed  - Laboratory data reviewed.  - I have personally reviewed all obtainable prior records and data/office records  - Please reconsult PRN if any new CV issues should arise    Thank you for allowing me to participate in the care of your patient    Jessica Flynn MD

## 2021-02-26 NOTE — PROGRESS NOTE ADULT - SUBJECTIVE AND OBJECTIVE BOX
Pt is a 80 year old female with history of HTN, HLD and paroxysmal angina who was admitted for a right femur fracture after sustaining a mechanical fall. She denies chest pain, dyspnea, palpitations and syncope. Labs reviewed. Case including plan discussed by phone with primary team.

## 2021-02-27 ENCOUNTER — RESULT REVIEW (OUTPATIENT)
Age: 81
End: 2021-02-27

## 2021-02-27 DIAGNOSIS — S72.001A FRACTURE OF UNSPECIFIED PART OF NECK OF RIGHT FEMUR, INITIAL ENCOUNTER FOR CLOSED FRACTURE: ICD-10-CM

## 2021-02-27 PROCEDURE — 88311 DECALCIFY TISSUE: CPT | Mod: 26

## 2021-02-27 PROCEDURE — 88305 TISSUE EXAM BY PATHOLOGIST: CPT | Mod: 26

## 2021-02-27 PROCEDURE — 99231 SBSQ HOSP IP/OBS SF/LOW 25: CPT | Mod: GC

## 2021-02-27 RX ORDER — SODIUM CHLORIDE 9 MG/ML
1000 INJECTION, SOLUTION INTRAVENOUS
Refills: 0 | Status: DISCONTINUED | OUTPATIENT
Start: 2021-02-27 | End: 2021-02-27

## 2021-02-27 RX ORDER — NICOTINE POLACRILEX 2 MG
1 GUM BUCCAL DAILY
Refills: 0 | Status: DISCONTINUED | OUTPATIENT
Start: 2021-02-27 | End: 2021-03-04

## 2021-02-27 RX ORDER — VANCOMYCIN HCL 1 G
750 VIAL (EA) INTRAVENOUS ONCE
Refills: 0 | Status: DISCONTINUED | OUTPATIENT
Start: 2021-02-27 | End: 2021-03-01

## 2021-02-27 RX ORDER — GENTAMICIN SULFATE 40 MG/ML
150 VIAL (ML) INJECTION ONCE
Refills: 0 | Status: DISCONTINUED | OUTPATIENT
Start: 2021-02-27 | End: 2021-03-01

## 2021-02-27 RX ORDER — ONDANSETRON 8 MG/1
4 TABLET, FILM COATED ORAL ONCE
Refills: 0 | Status: DISCONTINUED | OUTPATIENT
Start: 2021-02-27 | End: 2021-02-27

## 2021-02-27 RX ORDER — FENTANYL CITRATE 50 UG/ML
25 INJECTION INTRAVENOUS
Refills: 0 | Status: DISCONTINUED | OUTPATIENT
Start: 2021-02-27 | End: 2021-02-27

## 2021-02-27 RX ORDER — ENOXAPARIN SODIUM 100 MG/ML
40 INJECTION SUBCUTANEOUS DAILY
Refills: 0 | Status: DISCONTINUED | OUTPATIENT
Start: 2021-02-28 | End: 2021-03-04

## 2021-02-27 RX ADMIN — SENNA PLUS 2 TABLET(S): 8.6 TABLET ORAL at 21:07

## 2021-02-27 RX ADMIN — ATENOLOL 100 MILLIGRAM(S): 25 TABLET ORAL at 05:25

## 2021-02-27 RX ADMIN — Medication 650 MILLIGRAM(S): at 05:24

## 2021-02-27 RX ADMIN — Medication 1 PATCH: at 16:12

## 2021-02-27 RX ADMIN — Medication 100 MILLIGRAM(S): at 21:07

## 2021-02-27 RX ADMIN — Medication 1 PATCH: at 16:13

## 2021-02-27 RX ADMIN — AMLODIPINE BESYLATE 5 MILLIGRAM(S): 2.5 TABLET ORAL at 05:25

## 2021-02-27 RX ADMIN — Medication 1 PATCH: at 16:42

## 2021-02-27 RX ADMIN — LOSARTAN POTASSIUM 100 MILLIGRAM(S): 100 TABLET, FILM COATED ORAL at 05:25

## 2021-02-27 RX ADMIN — FENTANYL CITRATE 25 MICROGRAM(S): 50 INJECTION INTRAVENOUS at 14:56

## 2021-02-27 RX ADMIN — FENTANYL CITRATE 25 MICROGRAM(S): 50 INJECTION INTRAVENOUS at 14:49

## 2021-02-27 RX ADMIN — Medication 1 PATCH: at 19:51

## 2021-02-27 RX ADMIN — SODIUM CHLORIDE 100 MILLILITER(S): 9 INJECTION, SOLUTION INTRAVENOUS at 05:28

## 2021-02-27 RX ADMIN — SIMVASTATIN 20 MILLIGRAM(S): 20 TABLET, FILM COATED ORAL at 21:07

## 2021-02-27 NOTE — PROGRESS NOTE ADULT - SUBJECTIVE AND OBJECTIVE BOX
ORTHOPEDIC POST-OP PROGRESS NOTE:    Name: SOY TOVAR    MR #: [id number]    Procedure: Intramedulary nail of Right hip       DOS: 2/27/21      Pt comfortable without complaints, pain controlled. Denies CP, SOB, N/V, numbness/tingling               Vital Signs Last 24 Hrs  T(C): 36.3 (02-27-21 @ 15:45), Max: 36.4 (02-27-21 @ 15:15)  T(F): 97.3 (02-27-21 @ 15:45), Max: 97.5 (02-27-21 @ 15:15)  HR: 57 (02-27-21 @ 15:45) (57 - 69)  BP: 123/35 (02-27-21 @ 15:45) (111/40 - 132/55)  BP(mean): --  RR: 17 (02-27-21 @ 15:45) (12 - 19)  SpO2: 97% (02-27-21 @ 15:45) (94% - 100%)      General Exam:    General: Pt Alert and oriented, NAD, controlled pain.    Bloody Dressing noted    Dressing changed w/o complication     Skin: No erythema. No wound dehisence.    Pulses: 2+ dorsalis pedis pulse. Cap refill < 2 sec.    Sensation: Grossly intact to light touch without deficit.    Motor: No motor weaknesses found.        A/P: 80y Female  POD#0   s/p Intramedulary nail of Right hip       - Stable  - Pain Control  - DVT ppx as prescribed  - PT eval  - Weight bearing status: WBAT  -Cont post op protocol ABX

## 2021-02-27 NOTE — PROGRESS NOTE ADULT - SUBJECTIVE AND OBJECTIVE BOX
INTERVAL HPI/OVERNIGHT EVENTS: resting comfortably when seen, As per RN no new complaints, pain well controlled      MEDICATIONS  (STANDING):  acetaminophen   Tablet .. 650 milliGRAM(s) Oral every 6 hours  amLODIPine   Tablet 5 milliGRAM(s) Oral daily  ATENolol  Tablet 100 milliGRAM(s) Oral daily  ceFAZolin   IVPB 2000 milliGRAM(s) IV Intermittent once  lidocaine   Patch 1 Patch Transdermal daily  losartan 100 milliGRAM(s) Oral daily  multiple electrolytes Injection Type 1 1000 milliLiter(s) (100 mL/Hr) IV Continuous <Continuous>  nicotine   7 mG/24 Hr(s) Transdermal Patch -  Peds 1 Patch Transdermal daily  senna 2 Tablet(s) Oral at bedtime  simvastatin 20 milliGRAM(s) Oral at bedtime    MEDICATIONS  (PRN):  morphine  - Injectable 2 milliGRAM(s) IV Push every 6 hours PRN Breakthrough pain  ondansetron Injectable 4 milliGRAM(s) IV Push every 6 hours PRN Nausea  traMADol 25 milliGRAM(s) Oral every 4 hours PRN Moderate Pain (4 - 6)  traMADol 50 milliGRAM(s) Oral every 4 hours PRN Severe Pain (7 - 10)      Vital Signs Last 24 Hrs  T(C): 36.8 (26 Feb 2021 23:21), Max: 36.9 (26 Feb 2021 07:15)  T(F): 98.3 (26 Feb 2021 23:21), Max: 98.4 (26 Feb 2021 07:15)  HR: 67 (26 Feb 2021 23:21) (59 - 74)  BP: 142/63 (26 Feb 2021 23:21) (120/64 - 173/77)  BP(mean): --  RR: 18 (26 Feb 2021 23:21) (18 - 19)  SpO2: 96% (26 Feb 2021 23:21) (93% - 96%)    PHYSICAL EXAM:      Constitutional: NAD, sleeping when seen    Respiratory: no accessory muscle use    Cardiovascular: S1S2    Gastrointestinal: soft, NT/ND              I&O's Detail      LABS:                        12.8   8.05  )-----------( 200      ( 26 Feb 2021 07:08 )             39.7     02-26    143  |  106  |  18.0  ----------------------------<  109<H>  4.2   |  27.0  |  0.82    Ca    9.6      26 Feb 2021 07:08    TPro  7.1  /  Alb  4.1  /  TBili  <0.2<L>  /  DBili  x   /  AST  31  /  ALT  19  /  AlkPhos  100  02-25    PT/INR - ( 25 Feb 2021 21:12 )   PT: 11.8 sec;   INR: 1.02 ratio         PTT - ( 25 Feb 2021 21:12 )  PTT:29.2 sec      RADIOLOGY & ADDITIONAL STUDIES:

## 2021-02-28 LAB
ANION GAP SERPL CALC-SCNC: 17 MMOL/L — SIGNIFICANT CHANGE UP (ref 5–17)
BASOPHILS # BLD AUTO: 0.01 K/UL — SIGNIFICANT CHANGE UP (ref 0–0.2)
BASOPHILS NFR BLD AUTO: 0.1 % — SIGNIFICANT CHANGE UP (ref 0–2)
BUN SERPL-MCNC: 14 MG/DL — SIGNIFICANT CHANGE UP (ref 8–20)
CALCIUM SERPL-MCNC: 7.3 MG/DL — LOW (ref 8.6–10.2)
CHLORIDE SERPL-SCNC: 105 MMOL/L — SIGNIFICANT CHANGE UP (ref 98–107)
CO2 SERPL-SCNC: 24 MMOL/L — SIGNIFICANT CHANGE UP (ref 22–29)
CREAT SERPL-MCNC: 0.65 MG/DL — SIGNIFICANT CHANGE UP (ref 0.5–1.3)
EOSINOPHIL # BLD AUTO: 0 K/UL — SIGNIFICANT CHANGE UP (ref 0–0.5)
EOSINOPHIL NFR BLD AUTO: 0 % — SIGNIFICANT CHANGE UP (ref 0–6)
GLUCOSE SERPL-MCNC: 135 MG/DL — HIGH (ref 70–99)
HCT VFR BLD CALC: 26.9 % — LOW (ref 34.5–45)
HGB BLD-MCNC: 8.6 G/DL — LOW (ref 11.5–15.5)
IMM GRANULOCYTES NFR BLD AUTO: 0.3 % — SIGNIFICANT CHANGE UP (ref 0–1.5)
LYMPHOCYTES # BLD AUTO: 0.56 K/UL — LOW (ref 1–3.3)
LYMPHOCYTES # BLD AUTO: 6.5 % — LOW (ref 13–44)
MAGNESIUM SERPL-MCNC: 2.3 MG/DL — SIGNIFICANT CHANGE UP (ref 1.6–2.6)
MCHC RBC-ENTMCNC: 30.2 PG — SIGNIFICANT CHANGE UP (ref 27–34)
MCHC RBC-ENTMCNC: 32 GM/DL — SIGNIFICANT CHANGE UP (ref 32–36)
MCV RBC AUTO: 94.4 FL — SIGNIFICANT CHANGE UP (ref 80–100)
MONOCYTES # BLD AUTO: 0.71 K/UL — SIGNIFICANT CHANGE UP (ref 0–0.9)
MONOCYTES NFR BLD AUTO: 8.3 % — SIGNIFICANT CHANGE UP (ref 2–14)
NEUTROPHILS # BLD AUTO: 7.27 K/UL — SIGNIFICANT CHANGE UP (ref 1.8–7.4)
NEUTROPHILS NFR BLD AUTO: 84.8 % — HIGH (ref 43–77)
PHOSPHATE SERPL-MCNC: 2.6 MG/DL — SIGNIFICANT CHANGE UP (ref 2.4–4.7)
PLATELET # BLD AUTO: 141 K/UL — LOW (ref 150–400)
POTASSIUM SERPL-MCNC: 4.3 MMOL/L — SIGNIFICANT CHANGE UP (ref 3.5–5.3)
POTASSIUM SERPL-SCNC: 4.3 MMOL/L — SIGNIFICANT CHANGE UP (ref 3.5–5.3)
RBC # BLD: 2.85 M/UL — LOW (ref 3.8–5.2)
RBC # FLD: 13.3 % — SIGNIFICANT CHANGE UP (ref 10.3–14.5)
SARS-COV-2 IGG SERPL QL IA: NEGATIVE — SIGNIFICANT CHANGE UP
SODIUM SERPL-SCNC: 145 MMOL/L — SIGNIFICANT CHANGE UP (ref 135–145)
WBC # BLD: 8.58 K/UL — SIGNIFICANT CHANGE UP (ref 3.8–10.5)
WBC # FLD AUTO: 8.58 K/UL — SIGNIFICANT CHANGE UP (ref 3.8–10.5)

## 2021-02-28 PROCEDURE — 99231 SBSQ HOSP IP/OBS SF/LOW 25: CPT | Mod: GC

## 2021-02-28 RX ORDER — ASPIRIN/CALCIUM CARB/MAGNESIUM 324 MG
81 TABLET ORAL DAILY
Refills: 0 | Status: DISCONTINUED | OUTPATIENT
Start: 2021-02-28 | End: 2021-03-04

## 2021-02-28 RX ORDER — SODIUM CHLORIDE 9 MG/ML
1000 INJECTION, SOLUTION INTRAVENOUS
Refills: 0 | Status: DISCONTINUED | OUTPATIENT
Start: 2021-02-28 | End: 2021-03-01

## 2021-02-28 RX ADMIN — Medication 1 PATCH: at 11:44

## 2021-02-28 RX ADMIN — LOSARTAN POTASSIUM 100 MILLIGRAM(S): 100 TABLET, FILM COATED ORAL at 05:11

## 2021-02-28 RX ADMIN — Medication 100 MILLIGRAM(S): at 03:55

## 2021-02-28 RX ADMIN — AMLODIPINE BESYLATE 5 MILLIGRAM(S): 2.5 TABLET ORAL at 05:11

## 2021-02-28 RX ADMIN — SENNA PLUS 2 TABLET(S): 8.6 TABLET ORAL at 21:58

## 2021-02-28 RX ADMIN — Medication 1 PATCH: at 19:40

## 2021-02-28 RX ADMIN — SODIUM CHLORIDE 42 MILLILITER(S): 9 INJECTION, SOLUTION INTRAVENOUS at 20:15

## 2021-02-28 RX ADMIN — SODIUM CHLORIDE 100 MILLILITER(S): 9 INJECTION, SOLUTION INTRAVENOUS at 00:57

## 2021-02-28 RX ADMIN — ENOXAPARIN SODIUM 40 MILLIGRAM(S): 100 INJECTION SUBCUTANEOUS at 11:43

## 2021-02-28 RX ADMIN — ATENOLOL 100 MILLIGRAM(S): 25 TABLET ORAL at 05:11

## 2021-02-28 RX ADMIN — SIMVASTATIN 20 MILLIGRAM(S): 20 TABLET, FILM COATED ORAL at 21:58

## 2021-02-28 RX ADMIN — Medication 650 MILLIGRAM(S): at 11:44

## 2021-02-28 RX ADMIN — TRAMADOL HYDROCHLORIDE 50 MILLIGRAM(S): 50 TABLET ORAL at 11:44

## 2021-02-28 NOTE — OCCUPATIONAL THERAPY INITIAL EVALUATION ADULT - NS ASR OT EQUIP NEEDS DISCH
DME recommendations pending patient progress with therapy/bathing/bedside commode/rolling walker (5 inch wheels)

## 2021-02-28 NOTE — OCCUPATIONAL THERAPY INITIAL EVALUATION ADULT - DIAGNOSIS, OT EVAL
80y F PMHx HTN, HLD, s/p fall down stairs with Right intertrochanteric fx. Pt now s/p Right hip IMN (2/27)

## 2021-02-28 NOTE — OCCUPATIONAL THERAPY INITIAL EVALUATION ADULT - PHYSICAL ASSIST/NONPHYSICAL ASSIST:DRESS LOWER BODY, OT EVAL
Pt required assist with Right LE > Left LE 2* pain and limited ROM/decreased flexibility/verbal cues/1 person assist

## 2021-02-28 NOTE — PROGRESS NOTE ADULT - SUBJECTIVE AND OBJECTIVE BOX
INTERVAL HPI/OVERNIGHT EVENTS: resting comfortably when seen, As per RN no new complaints, pain well controlled    STATUS POST:  R IMN 2/27        MEDICATIONS  (STANDING):  acetaminophen   Tablet .. 650 milliGRAM(s) Oral every 6 hours  amLODIPine   Tablet 5 milliGRAM(s) Oral daily  ATENolol  Tablet 100 milliGRAM(s) Oral daily  clindamycin IVPB 900 milliGRAM(s) IV Intermittent <User Schedule>  enoxaparin Injectable 40 milliGRAM(s) SubCutaneous daily  gentamicin   IVPB 150 milliGRAM(s) IV Intermittent once  lidocaine   Patch 1 Patch Transdermal daily  losartan 100 milliGRAM(s) Oral daily  multiple electrolytes Injection Type 1 1000 milliLiter(s) (100 mL/Hr) IV Continuous <Continuous>  nicotine -   7 mG/24Hr(s) Patch 1 patch Transdermal daily  senna 2 Tablet(s) Oral at bedtime  simvastatin 20 milliGRAM(s) Oral at bedtime  vancomycin  IVPB 750 milliGRAM(s) IV Intermittent once    MEDICATIONS  (PRN):  morphine  - Injectable 2 milliGRAM(s) IV Push every 6 hours PRN Breakthrough pain  ondansetron Injectable 4 milliGRAM(s) IV Push every 6 hours PRN Nausea  traMADol 25 milliGRAM(s) Oral every 4 hours PRN Moderate Pain (4 - 6)  traMADol 50 milliGRAM(s) Oral every 4 hours PRN Severe Pain (7 - 10)      Vital Signs Last 24 Hrs  T(C): 36.8 (27 Feb 2021 23:16), Max: 37.1 (27 Feb 2021 20:02)  T(F): 98.2 (27 Feb 2021 23:16), Max: 98.8 (27 Feb 2021 20:02)  HR: 72 (27 Feb 2021 23:16) (57 - 94)  BP: 105/63 (27 Feb 2021 23:16) (105/63 - 148/64)  BP(mean): --  RR: 18 (27 Feb 2021 23:16) (12 - 20)  SpO2: 93% (27 Feb 2021 23:16) (91% - 100%)    PHYSICAL EXAM:      Constitutional: NAD, sleeping when seen    Respiratory: no accessory muscle use    Cardiovascular: S1S2    Gastrointestinal: soft, NT/ND    Extremities: blood tinged dressing, no edema        I&O's Detail    26 Feb 2021 07:01  -  27 Feb 2021 07:00  --------------------------------------------------------  IN:    multiple electrolytes Injection Type 1.: 1200 mL  Total IN: 1200 mL    OUT:    Voided (mL): 900 mL  Total OUT: 900 mL    Total NET: 300 mL      27 Feb 2021 07:01  -  28 Feb 2021 02:05  --------------------------------------------------------  IN:    IV PiggyBack: 50 mL    multiple electrolytes Injection Type 1.: 1200 mL  Total IN: 1250 mL    OUT:    Voided (mL): 925 mL  Total OUT: 925 mL    Total NET: 325 mL          LABS:                        12.8   8.05  )-----------( 200      ( 26 Feb 2021 07:08 )             39.7     02-26    143  |  106  |  18.0  ----------------------------<  109<H>  4.2   |  27.0  |  0.82    Ca    9.6      26 Feb 2021 07:08            RADIOLOGY & ADDITIONAL STUDIES:

## 2021-02-28 NOTE — OCCUPATIONAL THERAPY INITIAL EVALUATION ADULT - PLANNED THERAPY INTERVENTIONS, OT EVAL
toilet/toileting/ADL retraining/IADL retraining/balance training/bed mobility training/motor coordination training/neuromuscular re-education/ROM/strengthening/transfer training

## 2021-02-28 NOTE — PHYSICAL THERAPY INITIAL EVALUATION ADULT - ADDITIONAL COMMENTS
Pt is independent without DME PTA, lives alone in 2 story home with 4 MIKE + 1 threshold step with Bilateral HR. Bed/ bath is one main level, laundry and food pantry are down stairs in basement with 13 steps and 1 HR. Pt reports having a neighbor who can assist if necessary (unsure if physically able to assist). Pt does not own DME.

## 2021-02-28 NOTE — OCCUPATIONAL THERAPY INITIAL EVALUATION ADULT - LIVES WITH, PROFILE
Pt reports lives alone in private house with 4 steps to enter with bilateral hand rails + 1 threshold step. Pt states bed/bath is on main level. Pt reports 13 steps down with hand rail to basement where laundry and pantry are located. Pt reports has a neighbor who may be able to assist but only minimally. Pt states does not believe neighbor can assist physically./alone

## 2021-02-28 NOTE — PHYSICAL THERAPY INITIAL EVALUATION ADULT - ORIENTATION, REHAB EVAL
Serge Jacobo.     Referral Diagnosis:   Obesity, morbid (CMS/[facility]) [escobar, and cheese, bell peppers, onions   Lunch  Turkey  Lives with girlfriend and 2 of her children ages 17 and 20.  His girlfriend is her personal care worker.  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  Hopes to get back to work in the future.       Physical Activity  Type of physical activity: Resistance band exercises at night before bed for 15 minutes.  Has a stationary bike and is riding for a few minutes at a time.  Also doing some chair exercises that are on his new smart watch.  States that walking is not a good exercise for him at this point.  Discussed minimum exercise goal and encouraged him to stick with non weight bearing exercises for now. Instructed to record any planned exercise on food records.      Anthropometric Measurements:   Estimated body mass index is 63.18 kg/m² as calculated from the following:    Height as of this encounter: 5' 9.61\" (1.768 m).    Weight as of this encounter: 197.5 kg.    Initial weight: 447.7 lbs at initial consult with the surgeon on 12/20/19.     Weight Change: Down 9.3 pounds since last visit and down 12.3 lbs since initial consult.      Client History:   Past Medical History:   Diagnosis Date   • Anxiety     Panic attacks   • Arthritis     Bilateral knees and lower back   • Essential (primary) hypertension    • Other and unspecified hyperlipidemia    • Sinusitis, chronic     Envirnmental allergies   • Sleep apnea     CPAP     Family History   Problem Relation Age of Onset   • Cancer Maternal Grandmother         Pancreatic     Nutrition Diagnosis:   Overweight/Obesity related to history of excessive calorie intake and physical inactivity as evidenced by BMI.      Food-and-nutrition-related knowledge deficit related to diet recommendations for healthy weight loss and preparation for bariatric surgery as evidenced by failing to bring food records for review (fell behind his bed and he could not reach behind to get them), inadequate planned physical activity.      Nutrition Prescription:    Eat 3 small meals  [phone]---Meeting ID: [id number]---Passcode: [id number]  oriented to person, place, time and situation

## 2021-02-28 NOTE — OCCUPATIONAL THERAPY INITIAL EVALUATION ADULT - SPECIAL TRAINING, OT EVAL
Patient ambulated with RW and min A x 1, +external cues short distances around bed area due to pain, decreased weight shifting ability, balance and strength. Patient educated in proper use of RW including foot placement, body positioning and sequencing. Patient educated in energy conservation techniques including proper breathing and activity pacing.

## 2021-02-28 NOTE — OCCUPATIONAL THERAPY INITIAL EVALUATION ADULT - ADDITIONAL COMMENTS
Pt has a tub with curtains and no grab bars. Pt does not own any DME. Pt is right handed and drives. Pt reports independent with all ADLs/IADLs PTA.

## 2021-02-28 NOTE — PHYSICAL THERAPY INITIAL EVALUATION ADULT - ACTIVE RANGE OF MOTION EXAMINATION, REHAB EVAL
LLE AROM WFL. RLE HF <45 deg/bilateral upper extremity Active ROM was WFL (within functional limits)

## 2021-02-28 NOTE — PROGRESS NOTE ADULT - SUBJECTIVE AND OBJECTIVE BOX
Patient seen and eval at bedside. Patient has some pain at operative site but overall feels ok laying comfortably in bed. Pain well controlled with pain medication. Denies CP, SOB, dizziness, numbness/tingling.    Vital Signs Last 24 Hrs  T(C): 36.8 (28 Feb 2021 04:59), Max: 37.1 (27 Feb 2021 20:02)  T(F): 98.3 (28 Feb 2021 04:59), Max: 98.8 (27 Feb 2021 20:02)  HR: 80 (28 Feb 2021 04:59) (57 - 94)  BP: 114/62 (28 Feb 2021 04:59) (105/63 - 148/64)  BP(mean): --  RR: 18 (28 Feb 2021 04:59) (12 - 20)  SpO2: 98% (28 Feb 2021 04:59) (91% - 100%)    PE: NAD, alert awake  Right LE: Dressing has some mild bloody staining mostly C/D/I  EHL/TA/GS/FHL intact, Gross SILT s/s/DP/SP/tib distrib  DP pulse 2+, calf soft. NT B/L                          8.6    8.58  )-----------( 141      ( 28 Feb 2021 07:11 )             26.9     02-28    145  |  105  |  14.0  ----------------------------<  135<H>  4.3   |  24.0  |  0.65    A/P: s/p right hip IMN POD#1  ·	Monitor dressing  ·	Pain control  ·	DVT propx: lov//SCDs  ·	PT/OT - WBAT  ·	cont care as per primary team

## 2021-02-28 NOTE — PHYSICAL THERAPY INITIAL EVALUATION ADULT - GENERAL OBSERVATIONS, REHAB EVAL
Pt received in semifowler position with NAD, +IV, +Primafit, +bilateral VCB, . Patient agreeable to PT evaluation

## 2021-03-01 ENCOUNTER — TRANSCRIPTION ENCOUNTER (OUTPATIENT)
Age: 81
End: 2021-03-01

## 2021-03-01 LAB
ANION GAP SERPL CALC-SCNC: 13 MMOL/L — SIGNIFICANT CHANGE UP (ref 5–17)
BASOPHILS # BLD AUTO: 0.06 K/UL — SIGNIFICANT CHANGE UP (ref 0–0.2)
BASOPHILS NFR BLD AUTO: 0.6 % — SIGNIFICANT CHANGE UP (ref 0–2)
BUN SERPL-MCNC: 12 MG/DL — SIGNIFICANT CHANGE UP (ref 8–20)
CALCIUM SERPL-MCNC: 9.1 MG/DL — SIGNIFICANT CHANGE UP (ref 8.6–10.2)
CHLORIDE SERPL-SCNC: 105 MMOL/L — SIGNIFICANT CHANGE UP (ref 98–107)
CO2 SERPL-SCNC: 26 MMOL/L — SIGNIFICANT CHANGE UP (ref 22–29)
CREAT SERPL-MCNC: 0.65 MG/DL — SIGNIFICANT CHANGE UP (ref 0.5–1.3)
EOSINOPHIL # BLD AUTO: 0.21 K/UL — SIGNIFICANT CHANGE UP (ref 0–0.5)
EOSINOPHIL NFR BLD AUTO: 2.1 % — SIGNIFICANT CHANGE UP (ref 0–6)
GLUCOSE SERPL-MCNC: 82 MG/DL — SIGNIFICANT CHANGE UP (ref 70–99)
HCT VFR BLD CALC: 34.2 % — LOW (ref 34.5–45)
HGB BLD-MCNC: 10.9 G/DL — LOW (ref 11.5–15.5)
IMM GRANULOCYTES NFR BLD AUTO: 0.4 % — SIGNIFICANT CHANGE UP (ref 0–1.5)
LYMPHOCYTES # BLD AUTO: 2.36 K/UL — SIGNIFICANT CHANGE UP (ref 1–3.3)
LYMPHOCYTES # BLD AUTO: 23.5 % — SIGNIFICANT CHANGE UP (ref 13–44)
MAGNESIUM SERPL-MCNC: 1.9 MG/DL — SIGNIFICANT CHANGE UP (ref 1.6–2.6)
MCHC RBC-ENTMCNC: 29.9 PG — SIGNIFICANT CHANGE UP (ref 27–34)
MCHC RBC-ENTMCNC: 31.9 GM/DL — LOW (ref 32–36)
MCV RBC AUTO: 93.7 FL — SIGNIFICANT CHANGE UP (ref 80–100)
MONOCYTES # BLD AUTO: 0.91 K/UL — HIGH (ref 0–0.9)
MONOCYTES NFR BLD AUTO: 9.1 % — SIGNIFICANT CHANGE UP (ref 2–14)
NEUTROPHILS # BLD AUTO: 6.47 K/UL — SIGNIFICANT CHANGE UP (ref 1.8–7.4)
NEUTROPHILS NFR BLD AUTO: 64.3 % — SIGNIFICANT CHANGE UP (ref 43–77)
PHOSPHATE SERPL-MCNC: 2.6 MG/DL — SIGNIFICANT CHANGE UP (ref 2.4–4.7)
PLATELET # BLD AUTO: 192 K/UL — SIGNIFICANT CHANGE UP (ref 150–400)
POTASSIUM SERPL-MCNC: 3.6 MMOL/L — SIGNIFICANT CHANGE UP (ref 3.5–5.3)
POTASSIUM SERPL-SCNC: 3.6 MMOL/L — SIGNIFICANT CHANGE UP (ref 3.5–5.3)
RBC # BLD: 3.65 M/UL — LOW (ref 3.8–5.2)
RBC # FLD: 13.4 % — SIGNIFICANT CHANGE UP (ref 10.3–14.5)
SODIUM SERPL-SCNC: 144 MMOL/L — SIGNIFICANT CHANGE UP (ref 135–145)
WBC # BLD: 10.05 K/UL — SIGNIFICANT CHANGE UP (ref 3.8–10.5)
WBC # FLD AUTO: 10.05 K/UL — SIGNIFICANT CHANGE UP (ref 3.8–10.5)

## 2021-03-01 PROCEDURE — 99223 1ST HOSP IP/OBS HIGH 75: CPT | Mod: GC

## 2021-03-01 PROCEDURE — 99231 SBSQ HOSP IP/OBS SF/LOW 25: CPT

## 2021-03-01 RX ORDER — POTASSIUM CHLORIDE 20 MEQ
40 PACKET (EA) ORAL ONCE
Refills: 0 | Status: COMPLETED | OUTPATIENT
Start: 2021-03-01 | End: 2021-03-01

## 2021-03-01 RX ADMIN — Medication 81 MILLIGRAM(S): at 10:46

## 2021-03-01 RX ADMIN — Medication 1 PATCH: at 21:03

## 2021-03-01 RX ADMIN — Medication 1 PATCH: at 18:15

## 2021-03-01 RX ADMIN — ATENOLOL 100 MILLIGRAM(S): 25 TABLET ORAL at 05:23

## 2021-03-01 RX ADMIN — SENNA PLUS 2 TABLET(S): 8.6 TABLET ORAL at 21:47

## 2021-03-01 RX ADMIN — Medication 1 PATCH: at 18:14

## 2021-03-01 RX ADMIN — LIDOCAINE 1 PATCH: 4 CREAM TOPICAL at 21:58

## 2021-03-01 RX ADMIN — Medication 650 MILLIGRAM(S): at 17:37

## 2021-03-01 RX ADMIN — LIDOCAINE 1 PATCH: 4 CREAM TOPICAL at 10:47

## 2021-03-01 RX ADMIN — LOSARTAN POTASSIUM 100 MILLIGRAM(S): 100 TABLET, FILM COATED ORAL at 05:23

## 2021-03-01 RX ADMIN — ENOXAPARIN SODIUM 40 MILLIGRAM(S): 100 INJECTION SUBCUTANEOUS at 10:48

## 2021-03-01 RX ADMIN — Medication 40 MILLIEQUIVALENT(S): at 10:46

## 2021-03-01 RX ADMIN — AMLODIPINE BESYLATE 5 MILLIGRAM(S): 2.5 TABLET ORAL at 05:23

## 2021-03-01 RX ADMIN — LIDOCAINE 1 PATCH: 4 CREAM TOPICAL at 21:02

## 2021-03-01 RX ADMIN — SIMVASTATIN 20 MILLIGRAM(S): 20 TABLET, FILM COATED ORAL at 21:47

## 2021-03-01 RX ADMIN — Medication 1 PATCH: at 10:46

## 2021-03-01 NOTE — DISCHARGE NOTE PROVIDER - PROVIDER TOKENS
FREE:[LAST:[DeTore],FIRST:[David (DO)],PHONE:[phone]],FAX:[phone]],ADDRESS:[[address] [place] [address]]]

## 2021-03-01 NOTE — DISCHARGE NOTE PROVIDER - HOSPITAL COURSE
Patient is 79 y/o female s/p fall sustaining right intertrochanteric fracture. Patient was admitted to the trauma service & taken to the OR with orthopedics on 2/27 for IMN of right femur. Pt tolerated procedure well. Post-op she worked with PT, OT, and PMR who recommended _______________________.     Patient is advised to RETURN TO THE EMERGENCY DEPARTMENT for any of the following - worsening pain, fever/chills, nausea/vomiting, altered mental status, chest pain, shortness of breath, or any other new / worsening symptom.    Length of time preparing discharge > 30 minutes Patient is 81 y/o female s/p fall sustaining right intertrochanteric fracture. Patient was admitted to the trauma service & taken to the OR with orthopedics on 2/27 for IMN of right femur. Pt tolerated procedure well - recommended to be WBAT to RLE. Post-op she worked with PT, OT, and PMR. Recommendation is for acute rehab upon discharge. Pt currently tolerating diet, pain controlled, OOB, voiding. Stable for discharge w/ outpatient follow-up.     Patient is advised to RETURN TO THE EMERGENCY DEPARTMENT for any of the following - worsening pain, fever/chills, nausea/vomiting, altered mental status, chest pain, shortness of breath, or any other new / worsening symptom.    Length of time preparing discharge > 30 minutes

## 2021-03-01 NOTE — DISCHARGE NOTE PROVIDER - NSDCCPCAREPLAN_GEN_ALL_CORE_FT
PRINCIPAL DISCHARGE DIAGNOSIS  Diagnosis: Closed fracture of right hip, initial encounter  Assessment and Plan of Treatment: Follow up: Please call and make an appointment with Dr. Owens 1-2 weeks after discharge. Also, please call and make an appointment with your primary care physician as per your usual schedule.   Activity: Weight bearing as tolerated to right lower extremity. Keep RLE elevated to help with swelling.  Diet: May continue regular diet.  Medications: Please take all home medications as previously prescribed. Pain medication has been prescribed for you. Please, take it as it has been prescribed, do not drive or operate heavy machinery while taking narcotics. Additionally, please continue LOVENOX INJECTIONS x 4 weeks post-operatively for DVT prevention.  Wound Care: Patient may shower POD#5 (3/4/21.) Keep dressing clean and dry. May remove dressing on 3/6/21. Change dressing as needed if any drainage or bleeding occurs.  Patient is advised to RETURN TO THE EMERGENCY DEPARTMENT for any of the following - worsening pain, fever/chills, nausea/vomiting, altered mental status, chest pain, shortness of breath, or any other new / worsening symptom.       PRINCIPAL DISCHARGE DIAGNOSIS  Diagnosis: Closed fracture of right hip, initial encounter  Assessment and Plan of Treatment: Follow up: Please call and make an appointment with Dr. Lora 1-2 weeks after discharge. Also, please call and make an appointment with your primary care physician as per your usual schedule.   Activity: Weight bearing as tolerated to right lower extremity. Keep RLE elevated to help with swelling.  Diet: May continue regular diet.  Medications: Please take all home medications as previously prescribed. Pain medication has been prescribed for you. Please, take it as it has been prescribed, do not drive or operate heavy machinery while taking narcotics. Additionally, please continue LOVENOX INJECTIONS x 4 weeks post-operatively for DVT prevention.  Wound Care: Patient may shower POD#5 (3/4/21.) Keep dressing clean and dry. May remove dressing on 3/6/21. Change dressing as needed if any drainage or bleeding occurs.  Patient is advised to RETURN TO THE EMERGENCY DEPARTMENT for any of the following - worsening pain, fever/chills, nausea/vomiting, altered mental status, chest pain, shortness of breath, or any other new / worsening symptom.

## 2021-03-01 NOTE — PROGRESS NOTE ADULT - SUBJECTIVE AND OBJECTIVE BOX
Patient seen and eval at bedside. Patient has no complaints. Pain well controlled with pain medication and participating with PT. Denies CP, SOB, dizziness, numbness/tingling.    Vital Signs Last 24 Hrs  T(C): 37.4 (01 Mar 2021 04:54), Max: 37.5 (28 Feb 2021 18:25)  T(F): 99.4 (01 Mar 2021 04:54), Max: 99.5 (28 Feb 2021 18:25)  HR: 71 (01 Mar 2021 04:54) (71 - 82)  BP: 150/69 (01 Mar 2021 04:54) (129/63 - 150/69)  RR: 18 (01 Mar 2021 04:54) (18 - 19)  SpO2: 97% (01 Mar 2021 04:54) (92% - 97%)    PE: NAD, alert awake  Right LE: Dressing right lateral hip/thigh C/D/I, no bleeding or drainage noted  EHL/TA/GS/FHL intact, Gross SILT s/s/DP/SP/tib distrib  DP pulse 2+, calf soft. NT B/L  Thigh soft. compressible                          8.6    8.58  )-----------( 141      ( 28 Feb 2021 07:11 )             26.9       A/P: s/p right hip IMN POD#2  ·	Pain control  ·	DVT propx: lov//SCDs  ·	PT/OT - WBAT  ·	cont care as per primary team  ·	Ortho stable

## 2021-03-01 NOTE — CONSULT NOTE ADULT - SUBJECTIVE AND OBJECTIVE BOX
80yF was admitted on 02-25    In ED, GCS=    Patient is a 80y old  Female who presents with a chief complaint of Trauma (01 Mar 2021 07:23)    HPI:  Patient is a 80 F with traumatic fall down 2 stairs at home. Landed on her right side and denies head trauma and LOC. Patient reports she was walking down her stairs and her shoes slipped causing her to spin over and fall. Denies hitting her head. Was unable to ambulate after the fall and thus son brought her to hospital. Pain at right hip when she moves her leg, otherwise no pain anywhere    A: Protected, patient conversing   B: CTAB. Symmetrical chest rise  C: 2+ central (femoral) & peripheral pulses (Radial, DP)  D: GCS 15, MAEO, interacting. No poncho disability noted  E: No gross deformities on primary exposure    Vitals:  Temp: 38.7  HR: 90 BP: 146/87 RR: 18    CXR: Negative for evidence of hemo/pneumothorax (25 Feb 2021 21:57)      Imaging showed:  Xray Femur 2 views, Right 2/25: There is an intratrochanteric fracture of the right hip with comminution of the lesser trochanteric area and increased angulation at the fracture site.    CT AP No Contrast 2/26:   Comminuted and impacted fracture of the right femur intertrochanteric region. No acute intra-abdominal injury visualized.    Incidentally noted is a 1.1 x 1.0 cm nodule in the right lower lobe medially adjacent to the IVC. While this may be benign, malignancy cannot be excluded. Further evaluation is necessary. If no old films are available for comparison, noncontrast chest CT is recommended to assess for additional nodules. This was discussed with JADE Arredondo at 8:40 AM on 2/26/2021.    Interval HPI  Patient found to have right intertrochanteric femur fx, now s/p Right hip IMN on 2/27.  H&H drop to 8.6 post op, but increased to 10.9 w/ IVF.      REVIEW OF SYSTEMS  Constitutional - No fever, No weight loss, No fatigue  HEENT - No eye pain, No visual disturbances, No difficulty hearing, No tinnitus, No vertigo, No neck pain  Respiratory - No cough, No wheezing, No shortness of breath  Cardiovascular - No chest pain, No palpitations  Gastrointestinal - No abdominal pain, No nausea, No vomiting, No diarrhea, No constipation  Genitourinary - No dysuria, No frequency, No hematuria, No incontinence  Neurological - No headaches, No memory loss, No loss of strength, No numbness, No tremors  Skin - No itching, No rashes, No lesions   Endocrine - No temperature intolerance  Musculoskeletal - No joint pain, No joint swelling, No muscle pain  Psychiatric - No depression, No anxiety    VITALS  T(C): 37.4 (03-01-21 @ 04:54), Max: 37.5 (02-28-21 @ 18:25)  HR: 71 (03-01-21 @ 04:54) (71 - 82)  BP: 150/69 (03-01-21 @ 04:54) (129/63 - 150/69)  RR: 18 (03-01-21 @ 04:54) (18 - 19)  SpO2: 97% (03-01-21 @ 04:54) (92% - 97%)  Wt(kg): --    PAST MEDICAL & SURGICAL HISTORY      SOCIAL HISTORY - as per documentation/history  Smoking - everyday smoker, 66 pack yrs  EtOH - None  Drugs - None    FUNCTIONAL HISTORY   Pt reports lives alone in private house with 4 steps to enter with bilateral hand rails + 1 threshold step. Pt states bed/bath is on main level. Pt reports 13 steps down with hand rail to basement where laundry and pantry are located. Pt reports has a neighbor who may be able to assist but only minimally. Pt states does not believe neighbor can assist physically.      CURRENT FUNCTIONAL STATUS  2/28:      Bed Mobility: Rolling/Turning:     · Level of [place]	minimum assist (75% patients effort)  · Physical Assist/Nonphysical Assist	1 person assist; verbal cues  · Assistive Device	bed rails; head of bed elevated    Bed Mobility: Scooting/Bridging:     · Level of [place]	minimum assist (75% patients effort); scooting  · Physical Assist/Nonphysical Assist	1 person assist; verbal cues  · Assistive Device	bed rails    Bed Mobility: Sit to Supine:     · Level of [place]	minimum assist (75% patients effort)  · Physical Assist/Nonphysical Assist	1 person assist; verbal cues  · Assistive Device	bed rails    Bed Mobility: Supine to Sit:     · Level of [place]	minimum assist (75% patients effort)  · Physical Assist/Nonphysical Assist	1 person assist; verbal cues  · Assistive Device	bed rails; head of bed elevated    Bed Mobility Analysis:     · Bed Mobility Limitations	decreased ability to use legs for bridging/pushing  · Impairments Contributing to Impaired Bed Mobility	impaired balance; decreased flexibility; pain; decreased ROM; decreased strength    Transfer: Sit to Stand:     · Level of [place]	minimum assist (75% patients effort)  · Physical Assist/Nonphysical Assist	1 person assist; verbal cues  · Weight-Bearing Restrictions	weight-bearing as tolerated; Right LE  · Assistive Device	rolling walker    Transfer: Stand to Sit:     · Level of [place]	minimum assist (75% patients effort)  · Physical Assist/Nonphysical Assist	1 person assist; verbal cues  · Weight-Bearing Restrictions	weight-bearing as tolerated; Right LE  · Assistive Device	rolling walker    Sit/Stand Transfer Safety Analysis:     · Transfer Safety Concerns Noted	decreased weight-shifting ability  · Impairments Contributing to Impaired Transfers	decreased strength; decreased ROM; pain; decreased flexibility; impaired balance      Gait Skills:     · Level of [place]	minimum assist (75% patients effort)  · Physical Assist/Nonphysical Assist	1 person assist  · Weight-Bearing Restrictions	weight-bearing as tolerated; RLE  · Assistive Device	rolling walker  · Gait Distance	3 steps forwards/ backwards    Gait Analysis:     · Gait Pattern Used	3-point gait  · Gait Deviations Noted	decreased yuiry; decreased weight-shifting ability; decreased step length  · Impairments Contributing to Gait Deviations	pain      Bathing Training:     · Level of [place]	moderate assist (50% patients effort); to sponge bathe  · Physical Assist/Nonphysical Assist	1 person assist; verbal cues; set-up required    Upper Body Dressing Training:     · Level of [place]	minimum assist (75% patients effort); seated to don gown  · Physical Assist/Nonphysical Assist	1 person assist; verbal cues    Lower Body Dressing Training:     · Level of [place]	moderate assist (50% patients effort); seated EOB to don socks.  · Physical Assist/Nonphysical Assist	1 person assist; verbal cues; Pt required assist with Right LE > Left LE 2* pain and limited ROM/decreased flexibility    Toilet Hygiene Training:     · Level of [place]	to assess    Grooming Training:     · Level of [place]	supervision; seated      FAMILY HISTORY       RECENT LABS/IMAGING  CBC Full  -  ( 01 Mar 2021 07:08 )  WBC Count : 10.05 K/uL  RBC Count : 3.65 M/uL  Hemoglobin : 10.9 g/dL  Hematocrit : 34.2 %  Platelet Count - Automated : 192 K/uL  Mean Cell Volume : 93.7 fl  Mean Cell Hemoglobin : 29.9 pg  Mean Cell Hemoglobin Concentration : 31.9 gm/dL  Auto Neutrophil # : 6.47 K/uL  Auto Lymphocyte # : 2.36 K/uL  Auto Monocyte # : 0.91 K/uL  Auto Eosinophil # : 0.21 K/uL  Auto Basophil # : 0.06 K/uL  Auto Neutrophil % : 64.3 %  Auto Lymphocyte % : 23.5 %  Auto Monocyte % : 9.1 %  Auto Eosinophil % : 2.1 %  Auto Basophil % : 0.6 %    02-28    145  |  105  |  14.0  ----------------------------<  135<H>  4.3   |  24.0  |  0.65    Ca    7.3<L>      28 Feb 2021 07:11  Phos  2.6     02-28  Mg     2.3     02-28          ALLERGIES  penicillins (Unknown)  sulfa drugs (Unknown)      MEDICATIONS   acetaminophen   Tablet .. 650 milliGRAM(s) Oral every 6 hours  amLODIPine   Tablet 5 milliGRAM(s) Oral daily  aspirin  chewable 81 milliGRAM(s) Oral daily  ATENolol  Tablet 100 milliGRAM(s) Oral daily  enoxaparin Injectable 40 milliGRAM(s) SubCutaneous daily  lidocaine   Patch 1 Patch Transdermal daily  losartan 100 milliGRAM(s) Oral daily  morphine  - Injectable 2 milliGRAM(s) IV Push every 6 hours PRN  nicotine -   7 mG/24Hr(s) Patch 1 patch Transdermal daily  ondansetron Injectable 4 milliGRAM(s) IV Push every 6 hours PRN  senna 2 Tablet(s) Oral at bedtime  simvastatin 20 milliGRAM(s) Oral at bedtime  traMADol 25 milliGRAM(s) Oral every 4 hours PRN  traMADol 50 milliGRAM(s) Oral every 4 hours PRN       80yF was admitted on 02-25    In ED, GCS=    Patient is a 80y old  Female who presents with a chief complaint of Trauma (01 Mar 2021 07:23)    HPI:  Patient is a 80 F with traumatic fall down 2 stairs at home. Landed on her right side and denies head trauma and LOC. Patient reports she was walking down her stairs and her shoes slipped causing her to spin over and fall. Denies hitting her head. Was unable to ambulate after the fall and thus son brought her to hospital. Pain at right hip when she moves her leg, otherwise no pain anywhere    A: Protected, patient conversing   B: CTAB. Symmetrical chest rise  C: 2+ central (femoral) & peripheral pulses (Radial, DP)  D: GCS 15, MAEO, interacting. No poncho disability noted  E: No gross deformities on primary exposure    Vitals:  Temp: 38.7  HR: 90 BP: 146/87 RR: 18    CXR: Negative for evidence of hemo/pneumothorax (25 Feb 2021 21:57)      Imaging showed:  Xray Femur 2 views, Right 2/25: There is an intratrochanteric fracture of the right hip with comminution of the lesser trochanteric area and increased angulation at the fracture site.    CT AP No Contrast 2/26:   Comminuted and impacted fracture of the right femur intertrochanteric region. No acute intra-abdominal injury visualized.    Incidentally noted is a 1.1 x 1.0 cm nodule in the right lower lobe medially adjacent to the IVC. While this may be benign, malignancy cannot be excluded. Further evaluation is necessary. If no old films are available for comparison, noncontrast chest CT is recommended to assess for additional nodules. This was discussed with JADE Arredondo at 8:40 AM on 2/26/2021.    Interval HPI  Patient found to have right intertrochanteric femur fx, now s/p Right hip IMN on 2/27.  H&H drop to 8.6 post op, but increased to 10.9 w/ IVF.      REVIEW OF SYSTEMS  Constitutional - No fever, No weight loss, No fatigue  HEENT - No eye pain, No visual disturbances, No difficulty hearing, No tinnitus, No vertigo, No neck pain  Respiratory - No cough, No wheezing, No shortness of breath  Cardiovascular - No chest pain, No palpitations  Gastrointestinal - No abdominal pain, No nausea, No vomiting  Genitourinary - No dysuria, No frequency, No hematuria  Neurological - No headaches, No memory loss, +loss of strength, No numbness, No tremors  Skin - No itching, No rashes, No lesions   Endocrine - No temperature intolerance  Musculoskeletal - +joint pain, +joint swelling, +muscle pain  Psychiatric - No depression, No anxiety    VITALS  T(C): 37.4 (03-01-21 @ 04:54), Max: 37.5 (02-28-21 @ 18:25)  HR: 71 (03-01-21 @ 04:54) (71 - 82)  BP: 150/69 (03-01-21 @ 04:54) (129/63 - 150/69)  RR: 18 (03-01-21 @ 04:54) (18 - 19)  SpO2: 97% (03-01-21 @ 04:54) (92% - 97%)  Wt(kg): --    PAST MEDICAL & SURGICAL HISTORY      SOCIAL HISTORY - as per documentation/history  Smoking - everyday smoker, 66 pack yrs  EtOH - None  Drugs - None    FUNCTIONAL HISTORY  Pt reports lives alone in private house with 4 steps to enter with bilateral hand rails + 1 threshold step. Pt states bed/bath is on main level. Pt reports 13 steps down with hand rail to basement where laundry and pantry are located. Pt reports has a neighbor who may be able to assist but only minimally. Pt states does not believe neighbor can assist physically.      CURRENT FUNCTIONAL STATUS  3/1:    Bed Mobility  Bed Mobility Training Sit-to-Supine: moderate assist (50% patient effort)  Bed Mobility Training Supine-to-Sit: moderate assist (50% patient effort)    Sit-Stand Transfer Training  Transfer Training Sit-to-Stand Transfer: minimum assist (75% patient effort);  1 person assist;  weight-bearing as tolerated   rolling walker  Transfer Training Stand-to-Sit Transfer: minimum assist (75% patient effort);  1 person assist;  verbal cues;  weight-bearing as tolerated   rolling walker  Sit-to-Stand Transfer Training Transfer Safety Analysis: decreased strength;  impaired balance;  pain    Gait Training  Gait Training: minimum assist (75% patient effort);  1 person assist;  verbal cues;  weight-bearing as tolerated   rolling walker;  10 feet  Gait Analysis: 3-point gait   decreased step length;  decreased stride length;  decreased strength;  decreased ROM;  decreased flexibility;  pain;  10 feet;  rolling walker  Gait Number of Times:: x 1        2/28:  Bed Mobility: Rolling/Turning:     · Level of [place]	minimum assist (75% patients effort)  · Physical Assist/Nonphysical Assist	1 person assist; verbal cues  · Assistive Device	bed rails; head of bed elevated    Bed Mobility: Scooting/Bridging:     · Level of [place]	minimum assist (75% patients effort); scooting  · Physical Assist/Nonphysical Assist	1 person assist; verbal cues  · Assistive Device	bed rails    Bed Mobility: Sit to Supine:     · Level of [place]	minimum assist (75% patients effort)  · Physical Assist/Nonphysical Assist	1 person assist; verbal cues  · Assistive Device	bed rails    Bed Mobility: Supine to Sit:     · Level of [place]	minimum assist (75% patients effort)  · Physical Assist/Nonphysical Assist	1 person assist; verbal cues  · Assistive Device	bed rails; head of bed elevated    Bed Mobility Analysis:     · Bed Mobility Limitations	decreased ability to use legs for bridging/pushing  · Impairments Contributing to Impaired Bed Mobility	impaired balance; decreased flexibility; pain; decreased ROM; decreased strength    Transfer: Sit to Stand:     · Level of [place]	minimum assist (75% patients effort)  · Physical Assist/Nonphysical Assist	1 person assist; verbal cues  · Weight-Bearing Restrictions	weight-bearing as tolerated; Right LE  · Assistive Device	rolling walker    Transfer: Stand to Sit:     · Level of [place]	minimum assist (75% patients effort)  · Physical Assist/Nonphysical Assist	1 person assist; verbal cues  · Weight-Bearing Restrictions	weight-bearing as tolerated; Right LE  · Assistive Device	rolling walker    Sit/Stand Transfer Safety Analysis:     · Transfer Safety Concerns Noted	decreased weight-shifting ability  · Impairments Contributing to Impaired Transfers	decreased strength; decreased ROM; pain; decreased flexibility; impaired balance      Gait Skills:     · Level of [place]	minimum assist (75% patients effort)  · Physical Assist/Nonphysical Assist	1 person assist  · Weight-Bearing Restrictions	weight-bearing as tolerated; RLE  · Assistive Device	rolling walker  · Gait Distance	3 steps forwards/ backwards    Gait Analysis:     · Gait Pattern Used	3-point gait  · Gait Deviations Noted	decreased yuriy; decreased weight-shifting ability; decreased step length  · Impairments Contributing to Gait Deviations	pain      Bathing Training:     · Level of [place]	moderate assist (50% patients effort); to sponge bathe  · Physical Assist/Nonphysical Assist	1 person assist; verbal cues; set-up required    Upper Body Dressing Training:     · Level of [place]	minimum assist (75% patients effort); seated to don gown  · Physical Assist/Nonphysical Assist	1 person assist; verbal cues    Lower Body Dressing Training:     · Level of [place]	moderate assist (50% patients effort); seated EOB to don socks.  · Physical Assist/Nonphysical Assist	1 person assist; verbal cues; Pt required assist with Right LE > Left LE 2* pain and limited ROM/decreased flexibility    Toilet Hygiene Training:     · Level of [place]	to assess    Grooming Training:     · Level of [place]	supervision; seated      FAMILY HISTORY       RECENT LABS/IMAGING  CBC Full  -  ( 01 Mar 2021 07:08 )  WBC Count : 10.05 K/uL  RBC Count : 3.65 M/uL  Hemoglobin : 10.9 g/dL  Hematocrit : 34.2 %  Platelet Count - Automated : 192 K/uL  Mean Cell Volume : 93.7 fl  Mean Cell Hemoglobin : 29.9 pg  Mean Cell Hemoglobin Concentration : 31.9 gm/dL  Auto Neutrophil # : 6.47 K/uL  Auto Lymphocyte # : 2.36 K/uL  Auto Monocyte # : 0.91 K/uL  Auto Eosinophil # : 0.21 K/uL  Auto Basophil # : 0.06 K/uL  Auto Neutrophil % : 64.3 %  Auto Lymphocyte % : 23.5 %  Auto Monocyte % : 9.1 %  Auto Eosinophil % : 2.1 %  Auto Basophil % : 0.6 %    02-28    145  |  105  |  14.0  ----------------------------<  135<H>  4.3   |  24.0  |  0.65    Ca    7.3<L>      28 Feb 2021 07:11  Phos  2.6     02-28  Mg     2.3     02-28          ALLERGIES  penicillins (Unknown)  sulfa drugs (Unknown)      MEDICATIONS   acetaminophen   Tablet .. 650 milliGRAM(s) Oral every 6 hours  amLODIPine   Tablet 5 milliGRAM(s) Oral daily  aspirin  chewable 81 milliGRAM(s) Oral daily  ATENolol  Tablet 100 milliGRAM(s) Oral daily  enoxaparin Injectable 40 milliGRAM(s) SubCutaneous daily  lidocaine   Patch 1 Patch Transdermal daily  losartan 100 milliGRAM(s) Oral daily  morphine  - Injectable 2 milliGRAM(s) IV Push every 6 hours PRN  nicotine -   7 mG/24Hr(s) Patch 1 patch Transdermal daily  ondansetron Injectable 4 milliGRAM(s) IV Push every 6 hours PRN  senna 2 Tablet(s) Oral at bedtime  simvastatin 20 milliGRAM(s) Oral at bedtime  traMADol 25 milliGRAM(s) Oral every 4 hours PRN  traMADol 50 milliGRAM(s) Oral every 4 hours PRN    ----------------------------------------------------------------------------------------  PHYSICAL EXAM  Constitutional - NAD, Comfortable  HEENT - NCAT, EOMI  Neck - Supple, No limited ROM  Chest - Breathing comfortably, No wheezing  Cardiovascular - S1S2   Abdomen - Soft   Extremities - No calf tenderness, +Right hip and lateral thigh nonpitting edema and TTP  Neurologic Exam -                    Cognitive - Awake, Alert, AAO to self, place, date, year, situation     Communication - Fluent, No dysarthria     Cranial Nerves - CN 2-12 intact     Motor -                     LEFT    UE - ShAB 5/5, EF 5/5, EE 5/5, WE 5/5,  5/5                    RIGHT UE - ShAB 5/5, EF 5/5, EE 5/5, WE 5/5,  5/5                    LEFT    LE - HF 5/5, KE 5/5, DF 5/5, PF 5/5                    RIGHT LE - HF -/5, KE 4/5, DF 5/5, PF 5/5        Sensory - Intact to LT     Reflexes - DTR Intact, No primitive reflexes  Psychiatric - Mood stable, Affect WNL  ----------------------------------------------------------------------------------------    ASSESSMENT/PLAN  80yFemale with functional deficits after fall resulting in right intertrochanteric fx, now s/p Right hip IMN on 2/27.  R intertrochanteric fx - PT/OT. WBAT RLE.  Pain - Tylenol, Tramadol,  IV morphine - recommend dc IV medications  HTN - Norvasc, Atenolol, Losartan  CAD - ASA81  HLD - Simvastatin  DVT PPX - SCDs, lovenox  Rehab -   Recommend [facility], patient DOES NOT meet acute inpatient rehabilitation criteria. Patient needs a more prolonged stay to achieve transition to community.     Recommendations are based on current functional progress and may change. We will continue to follow.    Continue bedside therapy as well as OOB throughout the day with mobilization by staff to maintain cardiopulmonary function and prevention of secondary complications related to debility.     Discussed with clinical rehab team.  Patient is a 80 F with traumatic fall down 2 stairs at home. Landed on her right side and denies head trauma and LOC. Patient reports she was walking down her stairs and her shoes slipped causing her to spin over and fall. Denies hitting her head. Was unable to ambulate after the fall and thus son brought her to hospital. Pain at right hip when she moves her leg, otherwise no pain anywhere    A: Protected, patient conversing   B: CTAB. Symmetrical chest rise  C: 2+ central (femoral) & peripheral pulses (Radial, DP)  D: GCS 15, MAEO, interacting. No poncho disability noted  E: No gross deformities on primary exposure    Vitals:  Temp: 38.7  HR: 90 BP: 146/87 RR: 18    CXR: Negative for evidence of hemo/pneumothorax (25 Feb 2021 21:57)      Imaging showed:  Xray Femur 2 views, Right 2/25: There is an intratrochanteric fracture of the right hip with comminution of the lesser trochanteric area and increased angulation at the fracture site.    CT AP No Contrast 2/26:   Comminuted and impacted fracture of the right femur intertrochanteric region. No acute intra-abdominal injury visualized.    Incidentally noted is a 1.1 x 1.0 cm nodule in the right lower lobe medially adjacent to the IVC. While this may be benign, malignancy cannot be excluded. Further evaluation is necessary. If no old films are available for comparison, noncontrast chest CT is recommended to assess for additional nodules. This was discussed with JADE Arredondo at 8:40 AM on 2/26/2021.    Interval HPI  Patient found to have right intertrochanteric femur fx, now s/p Right hip IMN on 2/27.  H&H drop to 8.6 post op, but increased to 10.9 w/ IVF.      REVIEW OF SYSTEMS  Constitutional - No fever, No weight loss, No fatigue  HEENT - No eye pain, No visual disturbances, No difficulty hearing, No tinnitus, No vertigo, No neck pain  Respiratory - No cough, No wheezing, No shortness of breath  Cardiovascular - No chest pain, No palpitations  Gastrointestinal - No abdominal pain, No nausea, No vomiting  Genitourinary - No dysuria, No frequency, No hematuria  Neurological - No headaches, No memory loss, +loss of strength, No numbness, No tremors  Skin - No itching, No rashes, No lesions   Endocrine - No temperature intolerance  Musculoskeletal - +joint pain, +joint swelling, +muscle pain  Psychiatric - No depression, No anxiety    VITALS  T(C): 37.4 (03-01-21 @ 04:54), Max: 37.5 (02-28-21 @ 18:25)  HR: 71 (03-01-21 @ 04:54) (71 - 82)  BP: 150/69 (03-01-21 @ 04:54) (129/63 - 150/69)  RR: 18 (03-01-21 @ 04:54) (18 - 19)  SpO2: 97% (03-01-21 @ 04:54) (92% - 97%)  Wt(kg): --    PAST MEDICAL & SURGICAL HISTORY      SOCIAL HISTORY - as per documentation/history  Smoking - everyday smoker, 66 pack yrs  EtOH - None  Drugs - None    FUNCTIONAL HISTORY  Pt reports lives alone in private house with 4 steps to enter with bilateral hand rails + 1 threshold step. Pt states bed/bath is on main level. Pt reports 13 steps down with hand rail to basement where laundry and pantry are located. Pt reports has a neighbor who may be able to assist but only minimally. Pt states does not believe neighbor can assist physically.      CURRENT FUNCTIONAL STATUS  3/1:    Bed Mobility  Bed Mobility Training Sit-to-Supine: moderate assist (50% patient effort)  Bed Mobility Training Supine-to-Sit: moderate assist (50% patient effort)    Sit-Stand Transfer Training  Transfer Training Sit-to-Stand Transfer: minimum assist (75% patient effort);  1 person assist;  weight-bearing as tolerated   rolling walker  Transfer Training Stand-to-Sit Transfer: minimum assist (75% patient effort);  1 person assist;  verbal cues;  weight-bearing as tolerated   rolling walker  Sit-to-Stand Transfer Training Transfer Safety Analysis: decreased strength;  impaired balance;  pain    Gait Training  Gait Training: minimum assist (75% patient effort);  1 person assist;  verbal cues;  weight-bearing as tolerated   rolling walker;  10 feet  Gait Analysis: 3-point gait   decreased step length;  decreased stride length;  decreased strength;  decreased ROM;  decreased flexibility;  pain;  10 feet;  rolling walker  Gait Number of Times:: x 1        2/28:  Bed Mobility: Rolling/Turning:     · Level of [place]	minimum assist (75% patients effort)  · Physical Assist/Nonphysical Assist	1 person assist; verbal cues  · Assistive Device	bed rails; head of bed elevated    Bed Mobility: Scooting/Bridging:     · Level of [place]	minimum assist (75% patients effort); scooting  · Physical Assist/Nonphysical Assist	1 person assist; verbal cues  · Assistive Device	bed rails    Bed Mobility: Sit to Supine:     · Level of [place]	minimum assist (75% patients effort)  · Physical Assist/Nonphysical Assist	1 person assist; verbal cues  · Assistive Device	bed rails    Bed Mobility: Supine to Sit:     · Level of [place]	minimum assist (75% patients effort)  · Physical Assist/Nonphysical Assist	1 person assist; verbal cues  · Assistive Device	bed rails; head of bed elevated    Bed Mobility Analysis:     · Bed Mobility Limitations	decreased ability to use legs for bridging/pushing  · Impairments Contributing to Impaired Bed Mobility	impaired balance; decreased flexibility; pain; decreased ROM; decreased strength    Transfer: Sit to Stand:     · Level of [place]	minimum assist (75% patients effort)  · Physical Assist/Nonphysical Assist	1 person assist; verbal cues  · Weight-Bearing Restrictions	weight-bearing as tolerated; Right LE  · Assistive Device	rolling walker    Transfer: Stand to Sit:     · Level of [place]	minimum assist (75% patients effort)  · Physical Assist/Nonphysical Assist	1 person assist; verbal cues  · Weight-Bearing Restrictions	weight-bearing as tolerated; Right LE  · Assistive Device	rolling walker    Sit/Stand Transfer Safety Analysis:     · Transfer Safety Concerns Noted	decreased weight-shifting ability  · Impairments Contributing to Impaired Transfers	decreased strength; decreased ROM; pain; decreased flexibility; impaired balance      Gait Skills:     · Level of [place]	minimum assist (75% patients effort)  · Physical Assist/Nonphysical Assist	1 person assist    · Weight-Bearing Restrictions	weight-bearing as tolerated; RLE  · Assistive Device	rolling walker  · Gait Distance	3 steps forwards/ backwards    Gait Analysis:     · Gait Pattern Used	3-point gait  · Gait Deviations Noted	decreased yuriy; decreased weight-shifting ability; decreased step length  · Impairments Contributing to Gait Deviations	pain      Bathing Training:     · Level of [place]	moderate assist (50% patients effort); to sponge bathe  · Physical Assist/Nonphysical Assist	1 person assist; verbal cues; set-up required    Upper Body Dressing Training:     · Level of [place]	minimum assist (75% patients effort); seated to don gown  · Physical Assist/Nonphysical Assist	1 person assist; verbal cues    Lower Body Dressing Training:     · Level of [place]	moderate assist (50% patients effort); seated EOB to don socks.  · Physical Assist/Nonphysical Assist	1 person assist; verbal cues; Pt required assist with Right LE > Left LE 2* pain and limited ROM/decreased flexibility    Toilet Hygiene Training:     · Level of [place]	to assess    Grooming Training:     · Level of [place]	supervision; seated      FAMILY HISTORY   None in first degree    RECENT LABS/IMAGING  CBC Full  -  ( 01 Mar 2021 07:08 )  WBC Count : 10.05 K/uL  RBC Count : 3.65 M/uL  Hemoglobin : 10.9 g/dL  Hematocrit : 34.2 %  Platelet Count - Automated : 192 K/uL  Mean Cell Volume : 93.7 fl  Mean Cell Hemoglobin : 29.9 pg  Mean Cell Hemoglobin Concentration : 31.9 gm/dL  Auto Neutrophil # : 6.47 K/uL  Auto Lymphocyte # : 2.36 K/uL  Auto Monocyte # : 0.91 K/uL  Auto Eosinophil # : 0.21 K/uL  Auto Basophil # : 0.06 K/uL  Auto Neutrophil % : 64.3 %  Auto Lymphocyte % : 23.5 %  Auto Monocyte % : 9.1 %  Auto Eosinophil % : 2.1 %  Auto Basophil % : 0.6 %    02-28    145  |  105  |  14.0  ----------------------------<  135<H>  4.3   |  24.0  |  0.65    Ca    7.3<L>      28 Feb 2021 07:11  Phos  2.6     02-28  Mg     2.3     02-28          ALLERGIES  penicillins (Unknown)  sulfa drugs (Unknown)      MEDICATIONS   acetaminophen   Tablet .. 650 milliGRAM(s) Oral every 6 hours  amLODIPine   Tablet 5 milliGRAM(s) Oral daily  aspirin  chewable 81 milliGRAM(s) Oral daily  ATENolol  Tablet 100 milliGRAM(s) Oral daily  enoxaparin Injectable 40 milliGRAM(s) SubCutaneous daily  lidocaine   Patch 1 Patch Transdermal daily  losartan 100 milliGRAM(s) Oral daily  morphine  - Injectable 2 milliGRAM(s) IV Push every 6 hours PRN  nicotine -   7 mG/24Hr(s) Patch 1 patch Transdermal daily  ondansetron Injectable 4 milliGRAM(s) IV Push every 6 hours PRN  senna 2 Tablet(s) Oral at bedtime  simvastatin 20 milliGRAM(s) Oral at bedtime  traMADol 25 milliGRAM(s) Oral every 4 hours PRN  traMADol 50 milliGRAM(s) Oral every 4 hours PRN    ----------------------------------------------------------------------------------------  PHYSICAL EXAM  Constitutional - NAD, Comfortable  HEENT - NCAT, EOMI  Neck - Supple, No limited ROM  Chest - Breathing comfortably, No wheezing  Cardiovascular - S1S2   Abdomen - Soft   Extremities - No calf tenderness, +Right hip and lateral thigh nonpitting edema and TTP  Neurologic Exam -                    Cognitive - Awake, Alert, AAO to self, place, date, year, situation     Communication - Fluent, No dysarthria     Cranial Nerves - CN 2-12 intact     Motor -                     LEFT    UE - ShAB 5/5, EF 5/5, EE 5/5, WE 5/5,  5/5                    RIGHT UE - ShAB 5/5, EF 5/5, EE 5/5, WE 5/5,  5/5                    LEFT    LE - HF 5/5, KE 5/5, DF 5/5, PF 5/5                    RIGHT LE - HF 1/5, KE 1/5, DF 5/5, PF 5/5        Sensory - Intact to LT     Reflexes - DTR Intact, No primitive reflexes  Psychiatric - Mood stable, Affect WNL  ----------------------------------------------------------------------------------------  ASSESSMENT/PLAN  80yFemale with functional deficits after fall sustaining trauma  Right IT fracture s/p ORIF - WBAT    HTN - Norvasc, Atenolol, Losartan  CAD - ASA, Zocor  Pain - Tylenol, Tramadol, Lidoderm, Recommend DC  Morphine IV    DVT PPX - SCDs, Lovenox  Rehab - Recommend ACUTE inpatient rehabilitation for the functional deficits consisting of 3 hours of therapy/day & 24 hour RN/daily PMR physician for comorbid medical management. Will continue to follow for ongoing rehab needs and recommendations. Patient will be able to tolerate 3 hours a day.    Continue bedside therapy as well as OOB throughout the day with mobilization throughout the day with staff to maintain cardiopulmonary function and prevention of secondary complications related to debility.     Discussed with rehab clinical team.

## 2021-03-01 NOTE — DISCHARGE NOTE PROVIDER - NSDCFUADDINST_GEN_ALL_CORE_FT
ORTHO RECOMMENDATIONS:  The patient will be seen in the office between 1-2 weeks for wound check. Patient may shower POD#5 3/4/21. Keep dressing clean and dry. May remove dressing on 3/6/21. Change dressing as needed if any drainage or bleeding occurs. The patient will contact the office if the wound becomes red, has increasing pain, develops bleeding or discharge, an injury occurs, or has other concerns. The patient will continue LOVENOX for 4 weeks for DVTP. The patient will take pain medication as prescribed for pain control and titrate according to prescription and patient needs. The patient is weight bearing as tolerated on the right lower extremity. The patient is recommended to elevated the affected extremity to reduce swelling.

## 2021-03-01 NOTE — DISCHARGE NOTE PROVIDER - CARE PROVIDER_API CALL
David Lora (DO)  [address] [place] [address]  Phone: [phone]  Fax: [phone]  Follow Up Time:

## 2021-03-01 NOTE — CONSULT NOTE ADULT - ATTENDING COMMENTS
Orthopaedic Trauma Surgeon Addendum:    I have personally performed a face-to-face diagnostic evaluation on this patient.  I have reviewed the physician assistant note and agree with the history, exam, and plan of care, except as noted.    Orthopedic Surgery is ready to proceed with surgery pending medical optimization and adequate Operating Room availability. Risks of surgical delay discussed with other providers and staff. Please call with any questions or concerns.     Sharif Owens MD  Orthopaedic Trauma Surgeon  [facility]
Patient seen and examined. Case discussed with resident. Agree with recommendations.

## 2021-03-01 NOTE — CONSULT NOTE ADULT - CONSULT REASON
pre op
right hip fx
Functional eval  right intertrochanteric femur fx, now s/p Right hip IMN on 2/27

## 2021-03-01 NOTE — PROGRESS NOTE ADULT - SUBJECTIVE AND OBJECTIVE BOX
Progress Note    S: Pt seen and examined at bedside. Reports no acute complaints. Denies n/v/f/SOB/CP. Hb dropped from 12.8 to 8.6 post-op. IVF started for volume rescucitation. Pt yany reg diet, pain well controlled. Using IS pulling 1000    MEDICATIONS  (STANDING):  acetaminophen   Tablet .. 650 milliGRAM(s) Oral every 6 hours  amLODIPine   Tablet 5 milliGRAM(s) Oral daily  aspirin  chewable 81 milliGRAM(s) Oral daily  ATENolol  Tablet 100 milliGRAM(s) Oral daily  enoxaparin Injectable 40 milliGRAM(s) SubCutaneous daily  gentamicin   IVPB 150 milliGRAM(s) IV Intermittent once  lactated ringers. 1000 milliLiter(s) (42 mL/Hr) IV Continuous <Continuous>  lidocaine   Patch 1 Patch Transdermal daily  losartan 100 milliGRAM(s) Oral daily  nicotine -   7 mG/24Hr(s) Patch 1 patch Transdermal daily  senna 2 Tablet(s) Oral at bedtime  simvastatin 20 milliGRAM(s) Oral at bedtime  vancomycin  IVPB 750 milliGRAM(s) IV Intermittent once    MEDICATIONS  (PRN):  morphine  - Injectable 2 milliGRAM(s) IV Push every 6 hours PRN Breakthrough pain  ondansetron Injectable 4 milliGRAM(s) IV Push every 6 hours PRN Nausea  traMADol 25 milliGRAM(s) Oral every 4 hours PRN Moderate Pain (4 - 6)  traMADol 50 milliGRAM(s) Oral every 4 hours PRN Severe Pain (7 - 10)      Vital Signs Last 24 Hrs  T(C): 37.5 (28 Feb 2021 18:25), Max: 37.5 (28 Feb 2021 18:25)  T(F): 99.5 (28 Feb 2021 18:25), Max: 99.5 (28 Feb 2021 18:25)  HR: 80 (28 Feb 2021 18:25) (80 - 82)  BP: 129/63 (28 Feb 2021 18:25) (114/62 - 136/66)  BP(mean): --  RR: 19 (28 Feb 2021 18:25) (18 - 19)  SpO2: 93% (28 Feb 2021 18:25) (92% - 98%)    Physical Exam:   GEN: NAD, laying in bed, appears stated age  HEENT: NCAT, clear oral mucosa, normal conjunctiva  Chest: non-tender  CV:  non-tachycardic, 2+ radial pulse  Pulm: no increased work of breathing, no conversational dyspnea  GI: soft, non-tender  MSK: full ROM, equal strength, compartments soft, ext warm    I&O's Detail    27 Feb 2021 07:01  -  28 Feb 2021 07:00  --------------------------------------------------------  IN:    IV PiggyBack: 100 mL    multiple electrolytes Injection Type 1.: 1700 mL  Total IN: 1800 mL    OUT:    Voided (mL): 925 mL  Total OUT: 925 mL    Total NET: 875 mL      28 Feb 2021 07:01  -  01 Mar 2021 00:59  --------------------------------------------------------  IN:    Lactated Ringers: 210 mL  Total IN: 210 mL    OUT:    Voided (mL): 1400 mL  Total OUT: 1400 mL    Total NET: -1190 mL          LABS:                        8.6    8.58  )-----------( 141      ( 28 Feb 2021 07:11 )             26.9     02-28    145  |  105  |  14.0  ----------------------------<  135<H>  4.3   |  24.0  |  0.65    Ca    7.3<L>      28 Feb 2021 07:11  Phos  2.6     02-28  Mg     2.3     02-28

## 2021-03-01 NOTE — DISCHARGE NOTE PROVIDER - NSDCMRMEDTOKEN_GEN_ALL_CORE_FT
amLODIPine 5 mg oral tablet: 1 tab(s) orally once a day  Aspirin Enteric Coated 81 mg oral delayed release tablet: 1 tab(s) orally once a day  atenolol 100 mg oral tablet: 1 tab(s) orally once a day  losartan 100 mg oral tablet: 1 tab(s) orally once a day  simvastatin 20 mg oral tablet: 1 tab(s) orally once a day (at bedtime)   acetaminophen 325 mg oral tablet: 2 tab(s) orally every 6 hours  amLODIPine 5 mg oral tablet: 1 tab(s) orally once a day  Aspirin Enteric Coated 81 mg oral delayed release tablet: 1 tab(s) orally once a day  atenolol 100 mg oral tablet: 1 tab(s) orally once a day  enoxaparin: 40 milligram(s) subcutaneous once a day x 4 weeks post-operatively  lidocaine 5% topical film: 1 patch transdermal once a day. 12 hours on, 12 hours off  losartan 100 mg oral tablet: 1 tab(s) orally once a day  nicotine 7 mg/24 hr transdermal film, extended release: 1 patch transdermal once a day  senna oral tablet: 2 tab(s) orally once a day (at bedtime)  simvastatin 20 mg oral tablet: 1 tab(s) orally once a day (at bedtime)  traMADol 50 mg oral tablet: 1 tab(s) orally every 4 hours, As needed, Severe Pain (7 - 10)  traMADol 50 mg oral tablet: 0.5 tab(s) orally every 4 hours, As needed, Moderate Pain (4 - 6)

## 2021-03-02 LAB
HCT VFR BLD CALC: 36.3 % — SIGNIFICANT CHANGE UP (ref 34.5–45)
HGB BLD-MCNC: 11.7 G/DL — SIGNIFICANT CHANGE UP (ref 11.5–15.5)
MCHC RBC-ENTMCNC: 30 PG — SIGNIFICANT CHANGE UP (ref 27–34)
MCHC RBC-ENTMCNC: 32.2 GM/DL — SIGNIFICANT CHANGE UP (ref 32–36)
MCV RBC AUTO: 93.1 FL — SIGNIFICANT CHANGE UP (ref 80–100)
PLATELET # BLD AUTO: 240 K/UL — SIGNIFICANT CHANGE UP (ref 150–400)
RBC # BLD: 3.9 M/UL — SIGNIFICANT CHANGE UP (ref 3.8–5.2)
RBC # FLD: 13.6 % — SIGNIFICANT CHANGE UP (ref 10.3–14.5)
SARS-COV-2 RNA SPEC QL NAA+PROBE: SIGNIFICANT CHANGE UP
WBC # BLD: 9.15 K/UL — SIGNIFICANT CHANGE UP (ref 3.8–10.5)
WBC # FLD AUTO: 9.15 K/UL — SIGNIFICANT CHANGE UP (ref 3.8–10.5)

## 2021-03-02 PROCEDURE — 71045 X-RAY EXAM CHEST 1 VIEW: CPT | Mod: 26

## 2021-03-02 PROCEDURE — 99232 SBSQ HOSP IP/OBS MODERATE 35: CPT

## 2021-03-02 PROCEDURE — 99232 SBSQ HOSP IP/OBS MODERATE 35: CPT | Mod: GC

## 2021-03-02 RX ORDER — ATENOLOL 25 MG/1
1 TABLET ORAL
Qty: 0 | Refills: 0 | DISCHARGE
Start: 2021-03-02

## 2021-03-02 RX ORDER — TRAMADOL HYDROCHLORIDE 50 MG/1
0.5 TABLET ORAL
Qty: 0 | Refills: 0 | DISCHARGE
Start: 2021-03-02

## 2021-03-02 RX ORDER — AMLODIPINE BESYLATE 2.5 MG/1
1 TABLET ORAL
Qty: 0 | Refills: 0 | DISCHARGE
Start: 2021-03-02

## 2021-03-02 RX ORDER — LIDOCAINE 4 G/100G
1 CREAM TOPICAL
Qty: 0 | Refills: 0 | DISCHARGE
Start: 2021-03-02

## 2021-03-02 RX ORDER — ACETAMINOPHEN 500 MG
2 TABLET ORAL
Qty: 0 | Refills: 0 | DISCHARGE
Start: 2021-03-02

## 2021-03-02 RX ORDER — ENOXAPARIN SODIUM 100 MG/ML
40 INJECTION SUBCUTANEOUS
Qty: 0 | Refills: 0 | DISCHARGE
Start: 2021-03-02

## 2021-03-02 RX ORDER — ATENOLOL 25 MG/1
1 TABLET ORAL
Qty: 0 | Refills: 0 | DISCHARGE

## 2021-03-02 RX ORDER — TRAMADOL HYDROCHLORIDE 50 MG/1
1 TABLET ORAL
Qty: 0 | Refills: 0 | DISCHARGE
Start: 2021-03-02

## 2021-03-02 RX ORDER — AMLODIPINE BESYLATE 2.5 MG/1
1 TABLET ORAL
Qty: 0 | Refills: 0 | DISCHARGE

## 2021-03-02 RX ORDER — NICOTINE POLACRILEX 2 MG
1 GUM BUCCAL
Qty: 0 | Refills: 0 | DISCHARGE
Start: 2021-03-02

## 2021-03-02 RX ORDER — SENNA PLUS 8.6 MG/1
2 TABLET ORAL
Qty: 0 | Refills: 0 | DISCHARGE
Start: 2021-03-02

## 2021-03-02 RX ADMIN — LOSARTAN POTASSIUM 100 MILLIGRAM(S): 100 TABLET, FILM COATED ORAL at 05:45

## 2021-03-02 RX ADMIN — Medication 1 PATCH: at 22:01

## 2021-03-02 RX ADMIN — Medication 650 MILLIGRAM(S): at 07:05

## 2021-03-02 RX ADMIN — ATENOLOL 100 MILLIGRAM(S): 25 TABLET ORAL at 05:45

## 2021-03-02 RX ADMIN — ENOXAPARIN SODIUM 40 MILLIGRAM(S): 100 INJECTION SUBCUTANEOUS at 11:49

## 2021-03-02 RX ADMIN — AMLODIPINE BESYLATE 5 MILLIGRAM(S): 2.5 TABLET ORAL at 05:45

## 2021-03-02 RX ADMIN — Medication 650 MILLIGRAM(S): at 01:32

## 2021-03-02 RX ADMIN — Medication 1 PATCH: at 10:45

## 2021-03-02 RX ADMIN — SIMVASTATIN 20 MILLIGRAM(S): 20 TABLET, FILM COATED ORAL at 22:00

## 2021-03-02 RX ADMIN — Medication 1 PATCH: at 11:48

## 2021-03-02 RX ADMIN — Medication 650 MILLIGRAM(S): at 11:49

## 2021-03-02 RX ADMIN — Medication 1 PATCH: at 11:53

## 2021-03-02 RX ADMIN — Medication 81 MILLIGRAM(S): at 11:49

## 2021-03-02 RX ADMIN — LIDOCAINE 1 PATCH: 4 CREAM TOPICAL at 11:49

## 2021-03-02 RX ADMIN — LIDOCAINE 1 PATCH: 4 CREAM TOPICAL at 23:41

## 2021-03-02 RX ADMIN — LIDOCAINE 1 PATCH: 4 CREAM TOPICAL at 21:57

## 2021-03-02 RX ADMIN — SENNA PLUS 2 TABLET(S): 8.6 TABLET ORAL at 22:00

## 2021-03-02 NOTE — CHART NOTE - NSCHARTNOTEFT_GEN_A_CORE
Patient is medically stable for discharge to rehab. Was notified of concern given low grade temperatures, tmax yesterday 100F. Patient afebrile today. Patient is s/p IMN of right femur. Patient remains hemodynamically normal, no signs or symptoms to suggest systemic infection - she is afebrile with no leukocytosis. Fever is likely secondary to atelectasis. Patient needs aggressive pulmonary toiletting - incentive spirometer use, cough & deep breathing exercises. No additional work-up indicated at this time. Patient is medically stable for discharge to rehab. Was notified of concern given low grade temperatures, tmax yesterday 100F. Patient afebrile today. Patient is s/p IMN of right femur. Patient remains hemodynamically normal, no signs or symptoms to suggest systemic infection - she is afebrile with no leukocytosis. Low grade temp is likely secondary to atelectasis. Patient needs aggressive pulmonary toiletting - incentive spirometer use, cough & deep breathing exercises. No additional work-up indicated at this time. Patient is medically stable for discharge to rehab. Was notified of concern given low grade temperatures, tmax yesterday 100F. Patient afebrile today. Patient is s/p IMN of right femur. Patient remains hemodynamically normal, no signs or symptoms to suggest systemic infection - she is afebrile with no leukocytosis. Low grade temp is likely secondary to atelectasis. Patient needs aggressive pulmonary toiletting - incentive spirometer use, cough & deep breathing exercises, chest physical therapy. No additional work-up indicated at this time. Discussed w/ Dr. Le who is in agreement with plan.

## 2021-03-02 NOTE — PROGRESS NOTE ADULT - SUBJECTIVE AND OBJECTIVE BOX
Pt Name: SOY TOVAR  MRN: [id number]    Procedure: right hip IM nail  Surgeon: Guido    Patient is a 80y Female being followed for right hip IM nail, POD#3. Patient seen and examined at bedside. Patient is doing well. Pain is controlled with the prescribed medication. Complaining of mild pain to right knee, unsure of when pain started. Pain on patella, nonradiating.  Denies CP, SOB, N/V, numbness/tingling. No other orthopedic complaints.        PAST MEDICAL & SURGICAL HISTORY:      Allergies: penicillins (Unknown)  sulfa drugs (Unknown)      Vital Signs Last 24 Hrs  T(C): 36.7 (02 Mar 2021 00:01), Max: 37.8 (01 Mar 2021 16:57)  T(F): 98.1 (02 Mar 2021 00:01), Max: 100 (01 Mar 2021 16:57)  HR: 70 (02 Mar 2021 00:01) (70 - 83)  BP: 143/72 (02 Mar 2021 00:01) (143/72 - 160/62)  BP(mean): --  RR: 19 (02 Mar 2021 00:01) (18 - 19)  SpO2: 95% (02 Mar 2021 00:01) (93% - 96%)  Daily     Daily                           10.9   10.05 )-----------( 192      ( 01 Mar 2021 07:08 )             34.2     03-01    144  |  105  |  12.0  ----------------------------<  82  3.6   |  26.0  |  0.65    Ca    9.1      01 Mar 2021 07:08  Phos  2.6     03-01  Mg     1.9     03-01        PHYSICAL EXAM:    Appearance: Alert, responsive, in no acute distress.  Musculoskeletal:         Right Lower Extremity: +right hip dressing clean, dry, and intact. Skin is clean, dry, and intact. No abrasions, ecchymosis, or erythema. No bleeding. Mild tenderness to distal pole of patella, minimal swelling to RLE. FROM of knee. Sensation is grossly intact to light touch. + dorsi/plantarflexion/EHL/FHL. DP pulses 2+. Cap refill < 2 seconds. No cyanosis. No signs of venous insufficiency or stasis.         A/P:  Pt is a  80y Female with right IT fracture s/p right hip IM nail, POD#3. Mild knee tenderness post-operatively    PLAN:   * ortho stable  * Pain control, ice to knee PRN  * DVTp  * Weight Bearing Status: WBAT RLE  * Continue care as per primary team

## 2021-03-02 NOTE — PROGRESS NOTE ADULT - SUBJECTIVE AND OBJECTIVE BOX
Patient seen at bedside.   Denies fevers, chills, shortness of breath, dysuria.  Says she had 2x BM today.  No other complaints or issues.    REVIEW OF SYSTEMS  Constitutional - No fever,  +fatigue  HEENT - No vertigo, No neck pain  Neurological - No headaches, No memory loss, +loss of strength, No numbness, No tremors  Musculoskeletal - + joint pain, + joint swelling, + muscle pain  Psychiatric - No depression, No anxiety    FUNCTIONAL PROGRESS    3/1    Bed Mobility  Bed Mobility Training Sit-to-Supine: moderate assist (50% patient effort)  Bed Mobility Training Supine-to-Sit: moderate assist (50% patient effort)    Sit-Stand Transfer Training  Transfer Training Sit-to-Stand Transfer: minimum assist (75% patient effort);  1 person assist;  weight-bearing as tolerated   rolling walker  Transfer Training Stand-to-Sit Transfer: minimum assist (75% patient effort);  1 person assist;  verbal cues;  weight-bearing as tolerated   rolling walker  Sit-to-Stand Transfer Training Transfer Safety Analysis: decreased strength;  impaired balance;  pain    Gait Training  Gait Training: minimum assist (75% patient effort);  1 person assist;  verbal cues;  weight-bearing as tolerated   rolling walker;  10 feet  Gait Analysis: 3-point gait   decreased step length;  decreased stride length;  decreased strength;  decreased ROM;  decreased flexibility;  pain;  10 feet;  rolling walker  Gait Number of Times:: x 1        VITALS  T(C): 36.7 (03-02-21 @ 00:01), Max: 37.8 (03-01-21 @ 16:57)  HR: 70 (03-02-21 @ 00:01) (70 - 79)  BP: 143/72 (03-02-21 @ 00:01) (143/72 - 146/75)  RR: 19 (03-02-21 @ 00:01) (19 - 19)  SpO2: 95% (03-02-21 @ 00:01) (93% - 95%)  Wt(kg): --    MEDICATIONS   acetaminophen   Tablet .. 650 milliGRAM(s) every 6 hours  amLODIPine   Tablet 5 milliGRAM(s) daily  aspirin  chewable 81 milliGRAM(s) daily  ATENolol  Tablet 100 milliGRAM(s) daily  enoxaparin Injectable 40 milliGRAM(s) daily  lidocaine   Patch 1 Patch daily  losartan 100 milliGRAM(s) daily  nicotine -   7 mG/24Hr(s) Patch 1 patch daily  ondansetron Injectable 4 milliGRAM(s) every 6 hours PRN  senna 2 Tablet(s) at bedtime  simvastatin 20 milliGRAM(s) at bedtime  traMADol 25 milliGRAM(s) every 4 hours PRN  traMADol 50 milliGRAM(s) every 4 hours PRN      RECENT LABS/IMAGING                          10.9   10.05 )-----------( 192      ( 01 Mar 2021 07:08 )             34.2     03-01    144  |  105  |  12.0  ----------------------------<  82  3.6   |  26.0  |  0.65    Ca    9.1      01 Mar 2021 07:08  Phos  2.6     03-01  Mg     1.9     03-01    Xray Femur 2 views, Right 2/25: There is an intratrochanteric fracture of the right hip with comminution of the lesser trochanteric area and increased angulation at the fracture site.    CT AP No Contrast 2/26:   Comminuted and impacted fracture of the right femur intertrochanteric region. No acute intra-abdominal injury visualized.    Incidentally noted is a 1.1 x 1.0 cm nodule in the right lower lobe medially adjacent to the IVC. While this may be benign, malignancy cannot be excluded. Further evaluation is necessary. If no old films are available for comparison, noncontrast chest CT is recommended to assess for additional nodules. This was discussed with JADE Arredondo at 8:40 AM on 2/26/2021.      ----------------------------------------------------------------------------------------  PHYSICAL EXAM  Constitutional - NAD, Comfortable  HEENT - NCAT, EOMI  Neck - Supple, No limited ROM  Chest - Breathing comfortably, No wheezing  Cardiovascular - S1S2   Abdomen - Soft   Extremities - No calf tenderness, +Right hip and lateral thigh nonpitting edema and TTP  Neurologic Exam -                    Cognitive - Awake, Alert, AAO to self, place, date, year, situation     Communication - Fluent, No dysarthria     Cranial Nerves - CN 2-12 intact     Motor -                     LEFT    UE - ShAB 5/5, EF 5/5, EE 5/5, WE 5/5,  5/5                    RIGHT UE - ShAB 5/5, EF 5/5, EE 5/5, WE 5/5,  5/5                    LEFT    LE - HF 5/5, KE 5/5, DF 5/5, PF 5/5                    RIGHT LE - HF 1/5, KE 1/5, DF 5/5, PF 5/5        Sensory - Intact to LT     Reflexes - DTR Intact, No primitive reflexes  Psychiatric - Mood stable, Affect WNL  ----------------------------------------------------------------------------------------  ASSESSMENT/PLAN  80yFemale with functional deficits after fall sustaining trauma  Right IT fracture s/p ORIF - WBAT    HTN - Norvasc, Atenolol, Losartan  CAD - ASA, Zocor  Pain - Tylenol, Tramadol, Lidoderm  DVT PPX - SCDs, Lovenox  Rehab -   Recommend ACUTE inpatient rehabilitation for the functional deficits consisting of 3 hours of therapy/day & 24 hour RN/daily PMR physician for comorbid medical management. Will continue to follow for ongoing rehab needs and recommendations. Patient will be able to tolerate 3 hours a day.    Continue bedside therapy as well as OOB throughout the day with mobilization throughout the day with staff to maintain cardiopulmonary function and prevention of secondary complications related to debility.     Discussed with rehab clinical team.                Patient seen at bedside.   Denies fevers, chills, shortness of breath, dysuria.  Says she had 2x BM today.  Also c/o wet nonproductive cough  No other complaints or issues.    REVIEW OF SYSTEMS  Constitutional - No fever,  +fatigue  HEENT - No vertigo, No neck pain  Neurological - No headaches, No memory loss, +loss of strength, No numbness, No tremors  Musculoskeletal - + joint pain, + joint swelling, + muscle pain  Psychiatric - No depression, No anxiety    FUNCTIONAL PROGRESS    3/1    Bed Mobility  Bed Mobility Training Sit-to-Supine: moderate assist (50% patient effort)  Bed Mobility Training Supine-to-Sit: moderate assist (50% patient effort)    Sit-Stand Transfer Training  Transfer Training Sit-to-Stand Transfer: minimum assist (75% patient effort);  1 person assist;  weight-bearing as tolerated   rolling walker  Transfer Training Stand-to-Sit Transfer: minimum assist (75% patient effort);  1 person assist;  verbal cues;  weight-bearing as tolerated   rolling walker  Sit-to-Stand Transfer Training Transfer Safety Analysis: decreased strength;  impaired balance;  pain    Gait Training  Gait Training: minimum assist (75% patient effort);  1 person assist;  verbal cues;  weight-bearing as tolerated   rolling walker;  10 feet  Gait Analysis: 3-point gait   decreased step length;  decreased stride length;  decreased strength;  decreased ROM;  decreased flexibility;  pain;  10 feet;  rolling walker  Gait Number of Times:: x 1        VITALS  T(C): 36.7 (03-02-21 @ 00:01), Max: 37.8 (03-01-21 @ 16:57)  HR: 70 (03-02-21 @ 00:01) (70 - 79)  BP: 143/72 (03-02-21 @ 00:01) (143/72 - 146/75)  RR: 19 (03-02-21 @ 00:01) (19 - 19)  SpO2: 95% (03-02-21 @ 00:01) (93% - 95%)  Wt(kg): --    MEDICATIONS   acetaminophen   Tablet .. 650 milliGRAM(s) every 6 hours  amLODIPine   Tablet 5 milliGRAM(s) daily  aspirin  chewable 81 milliGRAM(s) daily  ATENolol  Tablet 100 milliGRAM(s) daily  enoxaparin Injectable 40 milliGRAM(s) daily  lidocaine   Patch 1 Patch daily  losartan 100 milliGRAM(s) daily  nicotine -   7 mG/24Hr(s) Patch 1 patch daily  ondansetron Injectable 4 milliGRAM(s) every 6 hours PRN  senna 2 Tablet(s) at bedtime  simvastatin 20 milliGRAM(s) at bedtime  traMADol 25 milliGRAM(s) every 4 hours PRN  traMADol 50 milliGRAM(s) every 4 hours PRN      RECENT LABS/IMAGING                          10.9   10.05 )-----------( 192      ( 01 Mar 2021 07:08 )             34.2     03-01    144  |  105  |  12.0  ----------------------------<  82  3.6   |  26.0  |  0.65    Ca    9.1      01 Mar 2021 07:08  Phos  2.6     03-01  Mg     1.9     03-01    Xray Femur 2 views, Right 2/25: There is an intratrochanteric fracture of the right hip with comminution of the lesser trochanteric area and increased angulation at the fracture site.    CT AP No Contrast 2/26:   Comminuted and impacted fracture of the right femur intertrochanteric region. No acute intra-abdominal injury visualized.    Incidentally noted is a 1.1 x 1.0 cm nodule in the right lower lobe medially adjacent to the IVC. While this may be benign, malignancy cannot be excluded. Further evaluation is necessary. If no old films are available for comparison, noncontrast chest CT is recommended to assess for additional nodules.     ----------------------------------------------------------------------------------------  PHYSICAL EXAM  Constitutional - NAD, Comfortable  HEENT - NCAT, EOMI  Neck - Supple, No limited ROM  Chest - Breathing comfortably, No wheezing  Cardiovascular - S1S2   Abdomen - Soft   Extremities - No calf tenderness, +Right hip and lateral thigh nonpitting edema and TTP  Neurologic Exam -                    Cognitive - Awake, Alert, AAO to self, place, date, year, situation     Communication - Fluent, No dysarthria     Cranial Nerves - CN 2-12 intact     Motor -                     LEFT    UE - ShAB 5/5, EF 5/5, EE 5/5, WE 5/5,  5/5                    RIGHT UE - ShAB 5/5, EF 5/5, EE 5/5, WE 5/5,  5/5                    LEFT    LE - HF 5/5, KE 5/5, DF 5/5, PF 5/5                    RIGHT LE - HF 1/5, KE 1/5, DF 5/5, PF 5/5        Sensory - Intact to LT     Reflexes - DTR Intact, No primitive reflexes  Psychiatric - Mood stable, Affect WNL  ----------------------------------------------------------------------------------------  ASSESSMENT/PLAN  80yFemale with functional deficits after fall sustaining trauma  Right IT fracture s/p ORIF - WBAT    Pulm - given high grade temp, and wet cough, consider CXR prior to dc to rehab  HTN - Norvasc, Atenolol, Losartan  CAD - ASA, Zocor  Pain - Tylenol, Tramadol, Lidoderm  DVT PPX - SCDs, Lovenox  Rehab -   Recommend ACUTE inpatient rehabilitation for the functional deficits consisting of 3 hours of therapy/day & 24 hour RN/daily PMR physician for comorbid medical management. Will continue to follow for ongoing rehab needs and recommendations. Patient will be able to tolerate 3 hours a day.    Continue bedside therapy as well as OOB throughout the day with mobilization throughout the day with staff to maintain cardiopulmonary function and prevention of secondary complications related to debility.     Discussed with rehab clinical team.                Patient seen at bedside.   Denies fevers, chills, shortness of breath, dysuria.  Says she had 2x BM today.  Also c/o wet nonproductive cough  No other complaints or issues.    REVIEW OF SYSTEMS  Constitutional - No fever,  +fatigue  HEENT - No vertigo, No neck pain  Neurological - No headaches, No memory loss, +loss of strength, No numbness, No tremors  Musculoskeletal - + joint pain, + joint swelling, + muscle pain  Psychiatric - No depression, No anxiety    FUNCTIONAL PROGRESS    3/1    Bed Mobility  Bed Mobility Training Sit-to-Supine: moderate assist (50% patient effort)  Bed Mobility Training Supine-to-Sit: moderate assist (50% patient effort)    Sit-Stand Transfer Training  Transfer Training Sit-to-Stand Transfer: minimum assist (75% patient effort);  1 person assist;  weight-bearing as tolerated   rolling walker  Transfer Training Stand-to-Sit Transfer: minimum assist (75% patient effort);  1 person assist;  verbal cues;  weight-bearing as tolerated   rolling walker  Sit-to-Stand Transfer Training Transfer Safety Analysis: decreased strength;  impaired balance;  pain    Gait Training  Gait Training: minimum assist (75% patient effort);  1 person assist;  verbal cues;  weight-bearing as tolerated   rolling walker;  10 feet  Gait Analysis: 3-point gait   decreased step length;  decreased stride length;  decreased strength;  decreased ROM;  decreased flexibility;  pain;  10 feet;  rolling walker  Gait Number of Times:: x 1        VITALS  T(C): 36.7 (03-02-21 @ 00:01), Max: 37.8 (03-01-21 @ 16:57)  HR: 70 (03-02-21 @ 00:01) (70 - 79)  BP: 143/72 (03-02-21 @ 00:01) (143/72 - 146/75)  RR: 19 (03-02-21 @ 00:01) (19 - 19)  SpO2: 95% (03-02-21 @ 00:01) (93% - 95%)  Wt(kg): --    MEDICATIONS   acetaminophen   Tablet .. 650 milliGRAM(s) every 6 hours  amLODIPine   Tablet 5 milliGRAM(s) daily  aspirin  chewable 81 milliGRAM(s) daily  ATENolol  Tablet 100 milliGRAM(s) daily  enoxaparin Injectable 40 milliGRAM(s) daily  lidocaine   Patch 1 Patch daily  losartan 100 milliGRAM(s) daily  nicotine -   7 mG/24Hr(s) Patch 1 patch daily  ondansetron Injectable 4 milliGRAM(s) every 6 hours PRN  senna 2 Tablet(s) at bedtime  simvastatin 20 milliGRAM(s) at bedtime  traMADol 25 milliGRAM(s) every 4 hours PRN  traMADol 50 milliGRAM(s) every 4 hours PRN      RECENT LABS/IMAGING                          10.9   10.05 )-----------( 192      ( 01 Mar 2021 07:08 )             34.2     03-01    144  |  105  |  12.0  ----------------------------<  82  3.6   |  26.0  |  0.65    Ca    9.1      01 Mar 2021 07:08  Phos  2.6     03-01  Mg     1.9     03-01    Xray Femur 2 views, Right 2/25: There is an intratrochanteric fracture of the right hip with comminution of the lesser trochanteric area and increased angulation at the fracture site.    CT AP No Contrast 2/26:   Comminuted and impacted fracture of the right femur intertrochanteric region. No acute intra-abdominal injury visualized.    Incidentally noted is a 1.1 x 1.0 cm nodule in the right lower lobe medially adjacent to the IVC. While this may be benign, malignancy cannot be excluded. Further evaluation is necessary. If no old films are available for comparison, noncontrast chest CT is recommended to assess for additional nodules.     ----------------------------------------------------------------------------------------  PHYSICAL EXAM  Constitutional - NAD, Comfortable  HEENT - NCAT, EOMI  Neck - Supple, No limited ROM  Chest - Breathing comfortably, No wheezing  Cardiovascular - S1S2   Abdomen - Soft   Extremities - No calf tenderness, +Right hip and lateral thigh nonpitting edema and TTP  Neurologic Exam -                    Cognitive - Awake, Alert, AAO to self, place, date, year, situation     Communication - Fluent, No dysarthria     Cranial Nerves - CN 2-12 intact     Motor -                     LEFT    UE - ShAB 5/5, EF 5/5, EE 5/5, WE 5/5,  5/5                    RIGHT UE - ShAB 5/5, EF 5/5, EE 5/5, WE 5/5,  5/5                    LEFT    LE - HF 5/5, KE 5/5, DF 5/5, PF 5/5                    RIGHT LE - HF 1/5, KE 1/5, DF 5/5, PF 5/5        Sensory - Intact to LT     Reflexes - DTR Intact, No primitive reflexes  Psychiatric - Mood stable, Affect WNL  ----------------------------------------------------------------------------------------  ASSESSMENT/PLAN  80yFemale with functional deficits after fall sustaining trauma  Right IT fracture s/p ORIF - WBAT    Pulm/Atelectasis - consider chest x-ray and CBC to further evaluate   HTN - Norvasc, Atenolol, Losartan  CAD - ASA, Zocor  Pain - Tylenol, Tramadol, Lidoderm  DVT PPX - SCDs, Lovenox  Rehab -   Recommend ACUTE inpatient rehabilitation for the functional deficits consisting of 3 hours of therapy/day & 24 hour RN/daily PMR physician for comorbid medical management. Will continue to follow for ongoing rehab needs and recommendations. Patient will be able to tolerate 3 hours a day.    Continue bedside therapy as well as OOB throughout the day with mobilization throughout the day with staff to maintain cardiopulmonary function and prevention of secondary complications related to debility.     Discussed with rehab clinical team.

## 2021-03-02 NOTE — PROGRESS NOTE ADULT - SUBJECTIVE AND OBJECTIVE BOX
Subjective/Overnight event: Evaluated at bedside found laying down in no distress AM. Pain under control. Tolerating diet, having bowel movement. Voiding adequate volumes.     Denies any fever/chills, nausea/vomiting, dizziness, SOB chest pain, constipation/diarrhea, weakness, numbness.       MEDICATIONS  (STANDING):  acetaminophen   Tablet .. 650 milliGRAM(s) Oral every 6 hours  amLODIPine   Tablet 5 milliGRAM(s) Oral daily  aspirin  chewable 81 milliGRAM(s) Oral daily  ATENolol  Tablet 100 milliGRAM(s) Oral daily  enoxaparin Injectable 40 milliGRAM(s) SubCutaneous daily  lidocaine   Patch 1 Patch Transdermal daily  losartan 100 milliGRAM(s) Oral daily  nicotine -   7 mG/24Hr(s) Patch 1 patch Transdermal daily  senna 2 Tablet(s) Oral at bedtime  simvastatin 20 milliGRAM(s) Oral at bedtime    MEDICATIONS  (PRN):  ondansetron Injectable 4 milliGRAM(s) IV Push every 6 hours PRN Nausea  traMADol 25 milliGRAM(s) Oral every 4 hours PRN Moderate Pain (4 - 6)  traMADol 50 milliGRAM(s) Oral every 4 hours PRN Severe Pain (7 - 10)      Vital Signs:  Vital Signs Last 24 Hrs  T(C): 36.7 (02 Mar 2021 00:01), Max: 37.8 (01 Mar 2021 16:57)  T(F): 98.1 (02 Mar 2021 00:01), Max: 100 (01 Mar 2021 16:57)  HR: 70 (02 Mar 2021 00:01) (70 - 83)  BP: 143/72 (02 Mar 2021 00:01) (143/72 - 160/62)  BP(mean): --  RR: 19 (02 Mar 2021 00:01) (18 - 19)  SpO2: 95% (02 Mar 2021 00:01) (93% - 97%)    I&O's Detail    28 Feb 2021 07:01  -  01 Mar 2021 07:00  --------------------------------------------------------  IN:    Lactated Ringers: 420 mL  Total IN: 420 mL    OUT:    Voided (mL): 2200 mL  Total OUT: 2200 mL    Total NET: -1780 mL      01 Mar 2021 07:01  -  02 Mar 2021 02:45  --------------------------------------------------------  IN:  Total IN: 0 mL    OUT:    Voided (mL): 450 mL  Total OUT: 450 mL    Total NET: -450 mL    Physical Examination:  General: alert, oriented x 3 in no acute distress   CV: normal S1/S2, RRR, no gallops, murmurs or rubs   Lungs: clear to auscultation b/l, symmetric chest movement, no rales, rhonchi or wheezing   Abdomen: soft, depressible, non-tender, non-distended, +BS  EXT: RUE: full ROM, sensation intact to pin and prick, pulses palpable +2 radial              LUE: full ROM, sensation intact to pin and prick, pulses palpable +2 radial              RLE: limited ROM, sensation intact, pulses palpable +2 DP           LLE: full ROM,  sensation intact to pin and prick, pulses palpable +2 DP    Labs:    03-01    144  |  105  |  12.0  ----------------------------<  82  3.6   |  26.0  |  0.65    Ca    9.1      01 Mar 2021 07:08  Phos  2.6     03-01  Mg     1.9     03-01    Assessment:  79 y/o F s/p Intermedullary nailing. Hgb has remained stable. Currently weight bearing as tolerated. Will follow up PM&R for final dispo recommendations. Likely Acute Rehab.    Plan:  - Follow up PM&R  - Follow up COVID test  - Incentive Spirometry   - out of bed with assistance                                 10.9   10.05 )-----------( 192      ( 01 Mar 2021 07:08 )             34.2

## 2021-03-02 NOTE — PROGRESS NOTE ADULT - SUBJECTIVE AND OBJECTIVE BOX
Subjective/Overnight event: Patient was evaluated at bedside found lying down in no distress AM. Denies any fever/chills, nausea/vomiting, dizziness, SOB chest pain, constipation/diarrhea, weakness, numbness.       MEDICATIONS  (STANDING):  acetaminophen   Tablet .. 650 milliGRAM(s) Oral every 6 hours  amLODIPine   Tablet 5 milliGRAM(s) Oral daily  aspirin  chewable 81 milliGRAM(s) Oral daily  ATENolol  Tablet 100 milliGRAM(s) Oral daily  enoxaparin Injectable 40 milliGRAM(s) SubCutaneous daily  lidocaine   Patch 1 Patch Transdermal daily  losartan 100 milliGRAM(s) Oral daily  nicotine -   7 mG/24Hr(s) Patch 1 patch Transdermal daily  senna 2 Tablet(s) Oral at bedtime  simvastatin 20 milliGRAM(s) Oral at bedtime    MEDICATIONS  (PRN):  ondansetron Injectable 4 milliGRAM(s) IV Push every 6 hours PRN Nausea  traMADol 25 milliGRAM(s) Oral every 4 hours PRN Moderate Pain (4 - 6)  traMADol 50 milliGRAM(s) Oral every 4 hours PRN Severe Pain (7 - 10)      Vital Signs:  Vital Signs Last 24 Hrs  T(C): 36.7 (02 Mar 2021 00:01), Max: 37.8 (01 Mar 2021 16:57)  T(F): 98.1 (02 Mar 2021 00:01), Max: 100 (01 Mar 2021 16:57)  HR: 70 (02 Mar 2021 00:01) (70 - 83)  BP: 143/72 (02 Mar 2021 00:01) (143/72 - 160/62)  BP(mean): --  RR: 19 (02 Mar 2021 00:01) (18 - 19)  SpO2: 95% (02 Mar 2021 00:01) (93% - 97%)    CAPILLARY BLOOD GLUCOSE          I&O's Detail    28 Feb 2021 07:01  -  01 Mar 2021 07:00  --------------------------------------------------------  IN:    Lactated Ringers: 420 mL  Total IN: 420 mL    OUT:    Voided (mL): 2200 mL  Total OUT: 2200 mL    Total NET: -1780 mL      01 Mar 2021 07:01  -  02 Mar 2021 02:27  --------------------------------------------------------  IN:  Total IN: 0 mL    OUT:    Voided (mL): 450 mL  Total OUT: 450 mL    Total NET: -450 mL          Physical Examination:  General: alert, oriented x 3 in no acute distress   CV: normal S1/S2, RRR, no gallops, murmurs or rubs   Lungs: clear to auscultation b/l, symmetric chest movement, no rales, rhonchi or wheezing   Abdomen: soft, depressible, non-tender, non-distended, +BS  EXT: RUE: full ROM, sensation intact to pin and prick, pulses palpable +2 radial              LUE: full ROM, sensation intact to pin and prick, pulses palpable +2 radial              RLE: full ROM,  sensation intact to pin and prick, pulses palpable +2 DP           LLE: full ROM,  sensation intact to pin and prick, pulses palpable +2 DP    Labs:    03-01    144  |  105  |  12.0  ----------------------------<  82  3.6   |  26.0  |  0.65    Ca    9.1      01 Mar 2021 07:08  Phos  2.6     03-01  Mg     1.9     03-01                              10.9   10.05 )-----------( 192      ( 01 Mar 2021 07:08 )             34.2

## 2021-03-03 LAB — SURGICAL PATHOLOGY STUDY: SIGNIFICANT CHANGE UP

## 2021-03-03 PROCEDURE — 99233 SBSQ HOSP IP/OBS HIGH 50: CPT

## 2021-03-03 PROCEDURE — 99232 SBSQ HOSP IP/OBS MODERATE 35: CPT | Mod: GC

## 2021-03-03 RX ADMIN — Medication 650 MILLIGRAM(S): at 19:15

## 2021-03-03 RX ADMIN — SIMVASTATIN 20 MILLIGRAM(S): 20 TABLET, FILM COATED ORAL at 20:11

## 2021-03-03 RX ADMIN — Medication 1 PATCH: at 10:16

## 2021-03-03 RX ADMIN — ENOXAPARIN SODIUM 40 MILLIGRAM(S): 100 INJECTION SUBCUTANEOUS at 13:56

## 2021-03-03 RX ADMIN — AMLODIPINE BESYLATE 5 MILLIGRAM(S): 2.5 TABLET ORAL at 05:58

## 2021-03-03 RX ADMIN — Medication 1 PATCH: at 13:56

## 2021-03-03 RX ADMIN — Medication 650 MILLIGRAM(S): at 13:56

## 2021-03-03 RX ADMIN — Medication 650 MILLIGRAM(S): at 22:19

## 2021-03-03 RX ADMIN — Medication 81 MILLIGRAM(S): at 13:56

## 2021-03-03 RX ADMIN — LOSARTAN POTASSIUM 100 MILLIGRAM(S): 100 TABLET, FILM COATED ORAL at 05:58

## 2021-03-03 RX ADMIN — LIDOCAINE 1 PATCH: 4 CREAM TOPICAL at 13:57

## 2021-03-03 RX ADMIN — Medication 650 MILLIGRAM(S): at 18:22

## 2021-03-03 RX ADMIN — Medication 650 MILLIGRAM(S): at 22:20

## 2021-03-03 RX ADMIN — Medication 1 PATCH: at 13:57

## 2021-03-03 RX ADMIN — Medication 650 MILLIGRAM(S): at 14:45

## 2021-03-03 RX ADMIN — Medication 1 PATCH: at 19:38

## 2021-03-03 RX ADMIN — ATENOLOL 100 MILLIGRAM(S): 25 TABLET ORAL at 05:58

## 2021-03-03 RX ADMIN — LIDOCAINE 1 PATCH: 4 CREAM TOPICAL at 19:38

## 2021-03-03 NOTE — PROGRESS NOTE ADULT - SUBJECTIVE AND OBJECTIVE BOX
Patient looking to leave for rehab.  Pain is well controlled.  Felt dizzy after therapy today which resolved.     REVIEW OF SYSTEMS  Constitutional - No fever,  +fatigue  HEENT - No vertigo, No neck pain  Neurological - No headaches, No memory loss, +loss of strength, No numbness, No tremors  Musculoskeletal - +joint pain, No joint swelling, +muscle pain  Psychiatric - No depression, No anxiety    FUNCTIONAL PROGRESS  3/3  Bed Mobility  Bed Mobility Training Sit-to-Supine: minimum assist (75% patient effort)  Bed Mobility Training Supine-to-Sit: minimum assist (75% patient effort)  Bed Mobility Training Limitations: decreased ROM    Sit-Stand Transfer Training  Transfer Training Sit-to-Stand Transfer: weight-bearing as tolerated   rolling walker;  minimum assist (75% patient effort)  Transfer Training Stand-to-Sit Transfer: minimum assist (75% patient effort);  weight-bearing as tolerated   rolling walker  Sit-to-Stand Transfer Training Transfer Safety Analysis: decreased strength;  decreased ROM;  decreased flexibility;  impaired balance    Gait Training  Gait Training: contact guard;  weight-bearing as tolerated   rolling walker;  40 feet  Gait Analysis: 2-point gait   decreased step length;  decreased ROM;  decreased strength;  impaired balance;  40 feet;  rolling walker  Gait Number of Times:: x 2      VITALS  T(C): 37.1 (03-03-21 @ 11:18), Max: 37.2 (03-02-21 @ 23:41)  HR: 76 (03-03-21 @ 11:18) (72 - 89)  BP: 103/58 (03-03-21 @ 11:18) (103/58 - 159/72)  RR: 18 (03-03-21 @ 11:18) (17 - 18)  SpO2: 92% (03-03-21 @ 11:18) (92% - 95%)  Wt(kg): --    MEDICATIONS   acetaminophen   Tablet .. 650 milliGRAM(s) every 6 hours  amLODIPine   Tablet 5 milliGRAM(s) daily  aspirin  chewable 81 milliGRAM(s) daily  ATENolol  Tablet 100 milliGRAM(s) daily  enoxaparin Injectable 40 milliGRAM(s) daily  lidocaine   Patch 1 Patch daily  losartan 100 milliGRAM(s) daily  nicotine -   7 mG/24Hr(s) Patch 1 patch daily  ondansetron Injectable 4 milliGRAM(s) every 6 hours PRN  senna 2 Tablet(s) at bedtime  simvastatin 20 milliGRAM(s) at bedtime  traMADol 25 milliGRAM(s) every 4 hours PRN  traMADol 50 milliGRAM(s) every 4 hours PRN      RECENT LABS/IMAGING                          11.7   9.15  )-----------( 240      ( 02 Mar 2021 12:48 )             36.3                                      10.9   10.05 )-----------( 192      ( 01 Mar 2021 07:08 )             34.2     03-01    144  |  105  |  12.0  ----------------------------<  82  3.6   |  26.0  |  0.65    Ca    9.1      01 Mar 2021 07:08  Phos  2.6     03-01  Mg     1.9     03-01    Xray Femur Right 2/25: There is an intratrochanteric fracture of the right hip with comminution of the lesser trochanteric area and increased angulation at the fracture site.    CT AP No Contrast 2/26 - Comminuted and impacted fracture of the right femur intertrochanteric region. No acute intra-abdominal injury visualized. Incidentally noted is a 1.1 x 1.0 cm nodule in the right lower lobe medially adjacent to the IVC. While this may be benign, malignancy cannot be excluded. Further evaluation is necessary. If no old films are available for comparison, noncontrast chest CT is recommended to assess for additional nodules.     ----------------------------------------------------------------------------------------  PHYSICAL EXAM  Constitutional - NAD, Comfortable  Extremities - No edema  Neurologic Exam -                    Cognitive - AAOx4     Motor -    LEFT    LE - HF 5/5, KE 5/5, DF 5/5, PF 5/5                    RIGHT LE - HF 1/5, KE 1/5, DF 5/5, PF 5/5        Sensory - Intact to LT     Reflexes - DTR Intact, No primitive reflexes  Psychiatric - Mood stable, Affect WNL  ----------------------------------------------------------------------------------------  ASSESSMENT/PLAN  80yFemale with functional deficits after fall sustaining trauma  Right IT fracture s/p ORIF - WBAT    HTN - Norvasc, Atenolol, Losartan  CAD - ASA, Zocor  Pain - Tylenol, Tramadol, Lidoderm  DVT PPX - SCDs, Lovenox  Rehab - Continue to recommend ACUTE inpatient rehabilitation for the functional deficits consisting of 3 hours of therapy/day & 24 hour RN/daily PMR physician for comorbid medical management. Will continue to follow for ongoing rehab needs and recommendations. Patient will be able to tolerate 3 hours a day.    Continue bedside therapy as well as OOB throughout the day with mobilization throughout the day with staff to maintain cardiopulmonary function and prevention of secondary complications related to debility.     Discussed with rehab clinical team.

## 2021-03-03 NOTE — PROGRESS NOTE ADULT - ASSESSMENT
80F PMHx HTN, HLD s/p fall down stairs with R intertrochanteric fx ORIF POD#1    - pain control  - WBAT  - PT/OT  - DVT PPx  - continue home meds
80F PMHx HTN, HLD s/p fall down stairs with R intertrochanteric fx    - pain control  - seen by Ortho, planned for OR tomorrow for ORIF  - bedrest until after OR  - DVT PPx
80 year old female s/p fall with right hip fracture with plan for orthopedic repair on 2/26    Ortho JADE PARRA states he will call anesthesia  Cardiologist Tammy was called for clearance. Dr. Flynn to see per answering service  - Pre-op labs and anesthesia clearance  - Ortho following with plan for OR today  - NPO at midnight, IVF  - resume home medications as indicated with hold parameters  - Dispo pending operative intervention  - DNR, ok for intubation per [facility]OLST form
80F PMHx HTN, HLD s/p fall down stairs with R intertrochanteric fx ORIF POD#2    - pain control  - WBAT  - PT/OT: AR pending therapy  - DVT PPx  - continue home meds  - monitor H/H  - monitor for stable vital signs
81 y/o F s/p fall found to have right intertrochanteric fracture now s/p Intermedullary nailing. Hgb has remained stable. Currently weight bearing as tolerated. Plans for acute rehab. Stable for discharge at this time, pending placement.    Plan:  - plan for discharge to Acute rehab  - repeated covid negative 3/1  - Incentive Spirometry   - out of bed with assistance   - WBAT  -lovenox for 4 weeks postoperatively

## 2021-03-03 NOTE — PROGRESS NOTE ADULT - SUBJECTIVE AND OBJECTIVE BOX
INTERVAL HPI/OVERNIGHT EVENTS:    SUBJECTIVE: Patient evaluated at bedside, found resting comfortably in bed, nad. No acute complaints or concerns. Denies sob, chest pain, n/v, headaches, dizziness. Hgb remains stable and pain well controlled. Awaiting placement to acute rehab.       MEDICATIONS  (STANDING):  acetaminophen   Tablet .. 650 milliGRAM(s) Oral every 6 hours  amLODIPine   Tablet 5 milliGRAM(s) Oral daily  aspirin  chewable 81 milliGRAM(s) Oral daily  ATENolol  Tablet 100 milliGRAM(s) Oral daily  enoxaparin Injectable 40 milliGRAM(s) SubCutaneous daily  lidocaine   Patch 1 Patch Transdermal daily  losartan 100 milliGRAM(s) Oral daily  nicotine -   7 mG/24Hr(s) Patch 1 patch Transdermal daily  senna 2 Tablet(s) Oral at bedtime  simvastatin 20 milliGRAM(s) Oral at bedtime    MEDICATIONS  (PRN):  ondansetron Injectable 4 milliGRAM(s) IV Push every 6 hours PRN Nausea  traMADol 25 milliGRAM(s) Oral every 4 hours PRN Moderate Pain (4 - 6)  traMADol 50 milliGRAM(s) Oral every 4 hours PRN Severe Pain (7 - 10)      Vital Signs Last 24 Hrs  T(C): 37.2 (02 Mar 2021 23:41), Max: 37.2 (02 Mar 2021 23:41)  T(F): 99 (02 Mar 2021 23:41), Max: 99 (02 Mar 2021 23:41)  HR: 84 (02 Mar 2021 23:41) (63 - 89)  BP: 145/74 (02 Mar 2021 23:41) (125/66 - 145/74)  BP(mean): --  RR: 18 (02 Mar 2021 23:41) (17 - 18)  SpO2: 95% (02 Mar 2021 23:41) (92% - 95%)    PE  Gen: resting comfortably in bed, nad  Pulm: no increased wob, no conversational dyspnea  Abd: non-distended, soft, non-tender  Ext: distal extremities warm and well-perfused, no peripheral edema, R thigh soft and non-tender, RLE motor and sensory intact        I&O's Detail    01 Mar 2021 07:01  -  02 Mar 2021 07:00  --------------------------------------------------------  IN:  Total IN: 0 mL    OUT:    Voided (mL): 1250 mL  Total OUT: 1250 mL    Total NET: -1250 mL      02 Mar 2021 07:01  -  03 Mar 2021 01:25  --------------------------------------------------------  IN:  Total IN: 0 mL    OUT:    Voided (mL): 400 mL  Total OUT: 400 mL    Total NET: -400 mL          LABS:                        11.7   9.15  )-----------( 240      ( 02 Mar 2021 12:48 )             36.3     03-01    144  |  105  |  12.0  ----------------------------<  82  3.6   |  26.0  |  0.65    Ca    9.1      01 Mar 2021 07:08  Phos  2.6     03-01  Mg     1.9     03-01            RADIOLOGY & ADDITIONAL STUDIES:

## 2021-03-04 ENCOUNTER — TRANSCRIPTION ENCOUNTER (OUTPATIENT)
Age: 81
End: 2021-03-04

## 2021-03-04 ENCOUNTER — INPATIENT (INPATIENT)
Facility: HOSPITAL | Age: 81
LOS: 12 days | Discharge: HOME HEALTH | DRG: 949 | End: 2021-03-17
Attending: PHYSICAL MEDICINE & REHABILITATION | Admitting: PHYSICAL MEDICINE & REHABILITATION
Payer: MEDICARE

## 2021-03-04 VITALS
OXYGEN SATURATION: 96 % | RESPIRATION RATE: 16 BRPM | SYSTOLIC BLOOD PRESSURE: 125 MMHG | HEART RATE: 73 BPM | HEIGHT: 60 IN | WEIGHT: 119.49 LBS | TEMPERATURE: 99 F | DIASTOLIC BLOOD PRESSURE: 67 MMHG

## 2021-03-04 VITALS
RESPIRATION RATE: 18 BRPM | SYSTOLIC BLOOD PRESSURE: 121 MMHG | OXYGEN SATURATION: 98 % | HEART RATE: 74 BPM | TEMPERATURE: 98 F | DIASTOLIC BLOOD PRESSURE: 77 MMHG

## 2021-03-04 DIAGNOSIS — S72.144D NONDISPLACED INTERTROCHANTERIC FRACTURE OF RIGHT FEMUR, SUBSEQUENT ENCOUNTER FOR CLOSED FRACTURE WITH ROUTINE HEALING: ICD-10-CM

## 2021-03-04 DIAGNOSIS — R91.1 SOLITARY PULMONARY NODULE: ICD-10-CM

## 2021-03-04 DIAGNOSIS — Z47.89 ENCOUNTER FOR OTHER ORTHOPEDIC AFTERCARE: ICD-10-CM

## 2021-03-04 DIAGNOSIS — F17.210 NICOTINE DEPENDENCE, CIGARETTES, UNCOMPLICATED: ICD-10-CM

## 2021-03-04 DIAGNOSIS — I25.10 ATHEROSCLEROTIC HEART DISEASE OF NATIVE CORONARY ARTERY WITHOUT ANGINA PECTORIS: ICD-10-CM

## 2021-03-04 DIAGNOSIS — R42 DIZZINESS AND GIDDINESS: ICD-10-CM

## 2021-03-04 DIAGNOSIS — I10 ESSENTIAL (PRIMARY) HYPERTENSION: ICD-10-CM

## 2021-03-04 DIAGNOSIS — Z51.89 ENCOUNTER FOR OTHER SPECIFIED AFTERCARE: ICD-10-CM

## 2021-03-04 DIAGNOSIS — E87.0 HYPEROSMOLALITY AND HYPERNATREMIA: ICD-10-CM

## 2021-03-04 DIAGNOSIS — D62 ACUTE POSTHEMORRHAGIC ANEMIA: ICD-10-CM

## 2021-03-04 DIAGNOSIS — S72.146A NONDISPLACED INTERTROCHANTERIC FRACTURE OF UNSPECIFIED FEMUR, INITIAL ENCOUNTER FOR CLOSED FRACTURE: ICD-10-CM

## 2021-03-04 DIAGNOSIS — Z91.81 HISTORY OF FALLING: ICD-10-CM

## 2021-03-04 DIAGNOSIS — E44.0 MODERATE PROTEIN-CALORIE MALNUTRITION: ICD-10-CM

## 2021-03-04 DIAGNOSIS — R79.89 OTHER SPECIFIED ABNORMAL FINDINGS OF BLOOD CHEMISTRY: ICD-10-CM

## 2021-03-04 DIAGNOSIS — Z79.82 LONG TERM (CURRENT) USE OF ASPIRIN: ICD-10-CM

## 2021-03-04 DIAGNOSIS — D75.82 HEPARIN INDUCED THROMBOCYTOPENIA (HIT): ICD-10-CM
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
## 2021-03-04 DIAGNOSIS — S72.144A NONDISPLACED INTERTROCHANTERIC FRACTURE OF RIGHT FEMUR, INITIAL ENCOUNTER FOR CLOSED FRACTURE: ICD-10-CM
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
## 2021-03-04 DIAGNOSIS — R26.9 UNSPECIFIED ABNORMALITIES OF GAIT AND MOBILITY: ICD-10-CM

## 2021-03-04 DIAGNOSIS — E86.0 DEHYDRATION: ICD-10-CM

## 2021-03-04 DIAGNOSIS — E88.09 OTHER DISORDERS OF PLASMA-PROTEIN METABOLISM, NOT ELSEWHERE CLASSIFIED: ICD-10-CM

## 2021-03-04 PROCEDURE — 86769 SARS-COV-2 COVID-19 ANTIBODY: CPT

## 2021-03-04 PROCEDURE — 80053 COMPREHEN METABOLIC PANEL: CPT

## 2021-03-04 PROCEDURE — 99232 SBSQ HOSP IP/OBS MODERATE 35: CPT | Mod: GC

## 2021-03-04 PROCEDURE — 97167 OT EVAL HIGH COMPLEX 60 MIN: CPT

## 2021-03-04 PROCEDURE — 86850 RBC ANTIBODY SCREEN: CPT

## 2021-03-04 PROCEDURE — 88305 TISSUE EXAM BY PATHOLOGIST: CPT

## 2021-03-04 PROCEDURE — 80048 BASIC METABOLIC PNL TOTAL CA: CPT

## 2021-03-04 PROCEDURE — 85027 COMPLETE CBC AUTOMATED: CPT

## 2021-03-04 PROCEDURE — 88311 DECALCIFY TISSUE: CPT

## 2021-03-04 PROCEDURE — 96374 THER/PROPH/DIAG INJ IV PUSH: CPT

## 2021-03-04 PROCEDURE — 99233 SBSQ HOSP IP/OBS HIGH 50: CPT

## 2021-03-04 PROCEDURE — C1889: CPT

## 2021-03-04 PROCEDURE — 74176 CT ABD & PELVIS W/O CONTRAST: CPT

## 2021-03-04 PROCEDURE — 99223 1ST HOSP IP/OBS HIGH 75: CPT | Mod: GC

## 2021-03-04 PROCEDURE — C8929: CPT

## 2021-03-04 PROCEDURE — 85610 PROTHROMBIN TIME: CPT

## 2021-03-04 PROCEDURE — 85730 THROMBOPLASTIN TIME PARTIAL: CPT

## 2021-03-04 PROCEDURE — U0005: CPT

## 2021-03-04 PROCEDURE — 73552 X-RAY EXAM OF FEMUR 2/>: CPT

## 2021-03-04 PROCEDURE — 93005 ELECTROCARDIOGRAM TRACING: CPT

## 2021-03-04 PROCEDURE — 96375 TX/PRO/DX INJ NEW DRUG ADDON: CPT

## 2021-03-04 PROCEDURE — 83735 ASSAY OF MAGNESIUM: CPT

## 2021-03-04 PROCEDURE — 99233 SBSQ HOSP IP/OBS HIGH 50: CPT | Mod: GC

## 2021-03-04 PROCEDURE — 83036 HEMOGLOBIN GLYCOSYLATED A1C: CPT

## 2021-03-04 PROCEDURE — 84100 ASSAY OF PHOSPHORUS: CPT

## 2021-03-04 PROCEDURE — 99285 EMERGENCY DEPT VISIT HI MDM: CPT | Mod: 25

## 2021-03-04 PROCEDURE — 73502 X-RAY EXAM HIP UNI 2-3 VIEWS: CPT

## 2021-03-04 PROCEDURE — 0031A: CPT

## 2021-03-04 PROCEDURE — C1713: CPT

## 2021-03-04 PROCEDURE — 76000 FLUOROSCOPY <1 HR PHYS/QHP: CPT

## 2021-03-04 PROCEDURE — 36415 COLL VENOUS BLD VENIPUNCTURE: CPT

## 2021-03-04 PROCEDURE — 86900 BLOOD TYPING SEROLOGIC ABO: CPT

## 2021-03-04 PROCEDURE — 97163 PT EVAL HIGH COMPLEX 45 MIN: CPT

## 2021-03-04 PROCEDURE — 86901 BLOOD TYPING SEROLOGIC RH(D): CPT

## 2021-03-04 PROCEDURE — U0003: CPT

## 2021-03-04 PROCEDURE — 85025 COMPLETE CBC W/AUTO DIFF WBC: CPT

## 2021-03-04 PROCEDURE — 71045 X-RAY EXAM CHEST 1 VIEW: CPT

## 2021-03-04 RX ORDER — ENOXAPARIN SODIUM 100 MG/ML
40 INJECTION SUBCUTANEOUS DAILY
Refills: 0 | Status: DISCONTINUED | OUTPATIENT
Start: 2021-03-05 | End: 2021-03-09

## 2021-03-04 RX ORDER — LOSARTAN POTASSIUM 100 MG/1
100 TABLET, FILM COATED ORAL DAILY
Refills: 0 | Status: DISCONTINUED | OUTPATIENT
Start: 2021-03-05 | End: 2021-03-13

## 2021-03-04 RX ORDER — ATENOLOL 25 MG/1
100 TABLET ORAL DAILY
Refills: 0 | Status: DISCONTINUED | OUTPATIENT
Start: 2021-03-05 | End: 2021-03-17

## 2021-03-04 RX ORDER — NICOTINE POLACRILEX 2 MG
1 GUM BUCCAL DAILY
Refills: 0 | Status: DISCONTINUED | OUTPATIENT
Start: 2021-03-05 | End: 2021-03-17

## 2021-03-04 RX ORDER — ACETAMINOPHEN 500 MG
650 TABLET ORAL EVERY 6 HOURS
Refills: 0 | Status: DISCONTINUED | OUTPATIENT
Start: 2021-03-04 | End: 2021-03-11

## 2021-03-04 RX ORDER — SIMVASTATIN 20 MG/1
20 TABLET, FILM COATED ORAL AT BEDTIME
Refills: 0 | Status: DISCONTINUED | OUTPATIENT
Start: 2021-03-04 | End: 2021-03-04

## 2021-03-04 RX ORDER — ASPIRIN/CALCIUM CARB/MAGNESIUM 324 MG
81 TABLET ORAL DAILY
Refills: 0 | Status: DISCONTINUED | OUTPATIENT
Start: 2021-03-05 | End: 2021-03-10

## 2021-03-04 RX ORDER — SENNA PLUS 8.6 MG/1
2 TABLET ORAL AT BEDTIME
Refills: 0 | Status: DISCONTINUED | OUTPATIENT
Start: 2021-03-04 | End: 2021-03-09

## 2021-03-04 RX ORDER — PANTOPRAZOLE SODIUM 20 MG/1
40 TABLET, DELAYED RELEASE ORAL
Refills: 0 | Status: DISCONTINUED | OUTPATIENT
Start: 2021-03-05 | End: 2021-03-17

## 2021-03-04 RX ORDER — JNJ-78436735 50000000000 [PFU]/.5ML
0.5 SUSPENSION INTRAMUSCULAR ONCE
Refills: 0 | Status: COMPLETED | OUTPATIENT
Start: 2021-03-04 | End: 2021-03-04

## 2021-03-04 RX ORDER — AMLODIPINE BESYLATE 2.5 MG/1
5 TABLET ORAL DAILY
Refills: 0 | Status: DISCONTINUED | OUTPATIENT
Start: 2021-03-05 | End: 2021-03-17

## 2021-03-04 RX ORDER — TRAMADOL HYDROCHLORIDE 50 MG/1
25 TABLET ORAL EVERY 4 HOURS
Refills: 0 | Status: DISCONTINUED | OUTPATIENT
Start: 2021-03-04 | End: 2021-03-08

## 2021-03-04 RX ORDER — POLYETHYLENE GLYCOL 3350 17 G/17G
17 POWDER, FOR SOLUTION ORAL DAILY
Refills: 0 | Status: DISCONTINUED | OUTPATIENT
Start: 2021-03-04 | End: 2021-03-17

## 2021-03-04 RX ORDER — LIDOCAINE 4 G/100G
1 CREAM TOPICAL
Refills: 0 | Status: DISCONTINUED | OUTPATIENT
Start: 2021-03-05 | End: 2021-03-17

## 2021-03-04 RX ORDER — TRAMADOL HYDROCHLORIDE 50 MG/1
50 TABLET ORAL EVERY 4 HOURS
Refills: 0 | Status: DISCONTINUED | OUTPATIENT
Start: 2021-03-04 | End: 2021-03-08

## 2021-03-04 RX ORDER — ATORVASTATIN CALCIUM 80 MG/1
10 TABLET, FILM COATED ORAL AT BEDTIME
Refills: 0 | Status: DISCONTINUED | OUTPATIENT
Start: 2021-03-04 | End: 2021-03-17

## 2021-03-04 RX ORDER — ASCORBIC ACID 60 MG
500 TABLET,CHEWABLE ORAL
Refills: 0 | Status: DISCONTINUED | OUTPATIENT
Start: 2021-03-05 | End: 2021-03-17

## 2021-03-04 RX ADMIN — JNJ-78436735 0.5 MILLILITER(S): 50000000000 SUSPENSION INTRAMUSCULAR at 14:45

## 2021-03-04 RX ADMIN — ENOXAPARIN SODIUM 40 MILLIGRAM(S): 100 INJECTION SUBCUTANEOUS at 11:37

## 2021-03-04 RX ADMIN — Medication 1 PATCH: at 11:42

## 2021-03-04 RX ADMIN — Medication 650 MILLIGRAM(S): at 05:40

## 2021-03-04 RX ADMIN — Medication 650 MILLIGRAM(S): at 11:42

## 2021-03-04 RX ADMIN — LIDOCAINE 1 PATCH: 4 CREAM TOPICAL at 05:38

## 2021-03-04 RX ADMIN — AMLODIPINE BESYLATE 5 MILLIGRAM(S): 2.5 TABLET ORAL at 05:39

## 2021-03-04 RX ADMIN — ATENOLOL 100 MILLIGRAM(S): 25 TABLET ORAL at 05:39

## 2021-03-04 RX ADMIN — ATORVASTATIN CALCIUM 10 MILLIGRAM(S): 80 TABLET, FILM COATED ORAL at 21:15

## 2021-03-04 RX ADMIN — Medication 1 PATCH: at 11:37

## 2021-03-04 RX ADMIN — Medication 81 MILLIGRAM(S): at 11:37

## 2021-03-04 RX ADMIN — Medication 650 MILLIGRAM(S): at 05:39

## 2021-03-04 RX ADMIN — LOSARTAN POTASSIUM 100 MILLIGRAM(S): 100 TABLET, FILM COATED ORAL at 05:39

## 2021-03-04 RX ADMIN — Medication 650 MILLIGRAM(S): at 11:38

## 2021-03-04 RX ADMIN — SENNA PLUS 2 TABLET(S): 8.6 TABLET ORAL at 21:16

## 2021-03-04 RX ADMIN — Medication 650 MILLIGRAM(S): at 22:00

## 2021-03-04 RX ADMIN — Medication 650 MILLIGRAM(S): at 21:15

## 2021-03-04 RX ADMIN — LIDOCAINE 1 PATCH: 4 CREAM TOPICAL at 11:37

## 2021-03-04 NOTE — PROGRESS NOTE ADULT - PROVIDER SPECIALTY LIST ADULT
Orthopedics
Rehab Medicine
Surgery
Trauma Surgery
Trauma Surgery
Orthopedics
Rehab Medicine
Rehab Medicine
Trauma Surgery
Anesthesia
Orthopedics
Orthopedics
Trauma Surgery

## 2021-03-04 NOTE — PROGRESS NOTE ADULT - SUBJECTIVE AND OBJECTIVE BOX
Patient reports right hip pain still limiting her in therapy.  Still awaiting for rehab bed.  Otherwise no other issues.    REVIEW OF SYSTEMS  Constitutional - No fever,  +fatigue  HEENT - No vertigo, No neck pain  Neurological - No headaches, No memory loss, +loss of strength, No numbness, No tremors  Musculoskeletal - +joint pain, No joint swelling, +muscle pain  Psychiatric - No depression, No anxiety    FUNCTIONAL PROGRESS    3/3:    Bed Mobility  Bed Mobility Training Sit-to-Supine: minimum assist (75% patient effort)  Bed Mobility Training Supine-to-Sit: minimum assist (75% patient effort)  Bed Mobility Training Limitations: decreased ROM    Sit-Stand Transfer Training  Transfer Training Sit-to-Stand Transfer: weight-bearing as tolerated   rolling walker;  minimum assist (75% patient effort)  Transfer Training Stand-to-Sit Transfer: minimum assist (75% patient effort);  weight-bearing as tolerated   rolling walker  Sit-to-Stand Transfer Training Transfer Safety Analysis: decreased strength;  decreased ROM;  decreased flexibility;  impaired balance      VITALS  T(C): 36.9 (03-04-21 @ 05:02), Max: 37.1 (03-03-21 @ 19:02)  HR: 80 (03-04-21 @ 05:02) (73 - 80)  BP: 131/67 (03-04-21 @ 05:02) (122/72 - 131/67)  RR: 18 (03-04-21 @ 05:02) (18 - 18)  SpO2: 93% (03-04-21 @ 05:02) (92% - 95%)  Wt(kg): --    MEDICATIONS   acetaminophen   Tablet .. 650 milliGRAM(s) every 6 hours  amLODIPine   Tablet 5 milliGRAM(s) daily  aspirin  chewable 81 milliGRAM(s) daily  ATENolol  Tablet 100 milliGRAM(s) daily  enoxaparin Injectable 40 milliGRAM(s) daily  lidocaine   Patch 1 Patch daily  losartan 100 milliGRAM(s) daily  nicotine -   7 mG/24Hr(s) Patch 1 patch daily  ondansetron Injectable 4 milliGRAM(s) every 6 hours PRN  senna 2 Tablet(s) at bedtime  simvastatin 20 milliGRAM(s) at bedtime  traMADol 25 milliGRAM(s) every 4 hours PRN  traMADol 50 milliGRAM(s) every 4 hours PRN      RECENT LABS/IMAGING                          11.7   9.15  )-----------( 240      ( 02 Mar 2021 12:48 )             36.3         Xray Femur Right 2/25: There is an intratrochanteric fracture of the right hip with comminution of the lesser trochanteric area and increased angulation at the fracture site.    CT AP No Contrast 2/26 - Comminuted and impacted fracture of the right femur intertrochanteric region. No acute intra-abdominal injury visualized. Incidentally noted is a 1.1 x 1.0 cm nodule in the right lower lobe medially adjacent to the IVC. While this may be benign, malignancy cannot be excluded. Further evaluation is necessary. If no old films are available for comparison, noncontrast chest CT is recommended to assess for additional nodules.     CXR 3/2: No evidence of active chest disease.    ----------------------------------------------------------------------------------------  PHYSICAL EXAM  Constitutional - NAD, Comfortable  Extremities - No edema  Neurologic Exam -                    Cognitive - AAOx4     Motor -    LEFT    LE - HF 5/5, KE 5/5, DF 5/5, PF 5/5                    RIGHT LE - HF 1/5, KE 1/5, DF 5/5, PF 5/5        Sensory - Intact to LT     Reflexes - DTR Intact, No primitive reflexes  Psychiatric - Mood stable, Affect WNL  ----------------------------------------------------------------------------------------  ASSESSMENT/PLAN  80yFemale with functional deficits after fall sustaining trauma  Right IT fracture s/p ORIF - WBAT    HTN - Norvasc, Atenolol, Losartan  CAD - ASA, Zocor  Smoking cessation - nicotine patch  Pain - Tylenol, Tramadol, Lidoderm  DVT PPX - SCDs, Lovenox  Rehab - Continue to recommend ACUTE inpatient rehabilitation for the functional deficits consisting of 3 hours of therapy/day & 24 hour RN/daily PMR physician for comorbid medical management. Will continue to follow for ongoing rehab needs and recommendations. Patient will be able to tolerate 3 hours a day.    Continue bedside therapy as well as OOB throughout the day with mobilization throughout the day with staff to maintain cardiopulmonary function and prevention of secondary complications related to debility.     Discussed with rehab clinical team.                  Patient reports right hip pain still limiting her in therapy.  Still awaiting for rehab bed.  Otherwise no other issues.    REVIEW OF SYSTEMS  Constitutional - No fever,  +fatigue  HEENT - No vertigo, No neck pain  Neurological - No headaches, No memory loss, +loss of strength, No numbness, No tremors  Musculoskeletal - +joint pain, No joint swelling, +muscle pain  Psychiatric - No depression, No anxiety    FUNCTIONAL PROGRESS  3/4  Bed Mobility  Bed Mobility Training Sit-to-Supine: minimum assist (75% patient effort);  1 person assist  Bed Mobility Training Supine-to-Sit: minimum assist (75% patient effort);  1 person assist  Bed Mobility Training Limitations: decreased ability to use legs for bridging/pushing    Sit-Stand Transfer Training  Transfer Training Sit-to-Stand Transfer: contact guard;  weight-bearing as tolerated   rolling walker  Transfer Training Stand-to-Sit Transfer: contact guard;  weight-bearing as tolerated   rolling walker  Sit-to-Stand Transfer Training Transfer Safety Analysis: decreased balance;  decreased ROM    Gait Training  Gait Training: contact guard;  weight-bearing as tolerated   rolling walker;  50 feet  Gait Analysis: 3-point gait   decreased step length;  decreased ROM;  decreased flexibility;  50 feet;  rolling walker  Gait Number of Times:: x 2    3/3  Bed Mobility  Bed Mobility Training Sit-to-Supine: minimum assist (75% patient effort)  Bed Mobility Training Supine-to-Sit: minimum assist (75% patient effort)  Bed Mobility Training Limitations: decreased ROM    Sit-Stand Transfer Training  Transfer Training Sit-to-Stand Transfer: weight-bearing as tolerated   rolling walker;  minimum assist (75% patient effort)  Transfer Training Stand-to-Sit Transfer: minimum assist (75% patient effort);  weight-bearing as tolerated   rolling walker  Sit-to-Stand Transfer Training Transfer Safety Analysis: decreased strength;  decreased ROM;  decreased flexibility;  impaired balance      VITALS  T(C): 36.9 (03-04-21 @ 05:02), Max: 37.1 (03-03-21 @ 19:02)  HR: 80 (03-04-21 @ 05:02) (73 - 80)  BP: 131/67 (03-04-21 @ 05:02) (122/72 - 131/67)  RR: 18 (03-04-21 @ 05:02) (18 - 18)  SpO2: 93% (03-04-21 @ 05:02) (92% - 95%)  Wt(kg): --    MEDICATIONS   acetaminophen   Tablet .. 650 milliGRAM(s) every 6 hours  amLODIPine   Tablet 5 milliGRAM(s) daily  aspirin  chewable 81 milliGRAM(s) daily  ATENolol  Tablet 100 milliGRAM(s) daily  enoxaparin Injectable 40 milliGRAM(s) daily  lidocaine   Patch 1 Patch daily  losartan 100 milliGRAM(s) daily  nicotine -   7 mG/24Hr(s) Patch 1 patch daily  ondansetron Injectable 4 milliGRAM(s) every 6 hours PRN  senna 2 Tablet(s) at bedtime  simvastatin 20 milliGRAM(s) at bedtime  traMADol 25 milliGRAM(s) every 4 hours PRN  traMADol 50 milliGRAM(s) every 4 hours PRN      RECENT LABS/IMAGING                          11.7   9.15  )-----------( 240      ( 02 Mar 2021 12:48 )             36.3         Xray Femur Right 2/25: There is an intratrochanteric fracture of the right hip with comminution of the lesser trochanteric area and increased angulation at the fracture site.    CT AP No Contrast 2/26 - Comminuted and impacted fracture of the right femur intertrochanteric region. No acute intra-abdominal injury visualized. Incidentally noted is a 1.1 x 1.0 cm nodule in the right lower lobe medially adjacent to the IVC. While this may be benign, malignancy cannot be excluded. Further evaluation is necessary. If no old films are available for comparison, noncontrast chest CT is recommended to assess for additional nodules.     CXR 3/2: No evidence of active chest disease.    ----------------------------------------------------------------------------------------  PHYSICAL EXAM  Constitutional - NAD, Comfortable  Extremities - No edema  Neurologic Exam -                    Cognitive - AAOx4     Motor -    LEFT    LE - HF 5/5, KE 5/5, DF 5/5, PF 5/5                    RIGHT LE - HF 1/5, KE 1/5, DF 5/5, PF 5/5        Sensory - Intact to LT     Reflexes - DTR Intact, No primitive reflexes  Psychiatric - Mood stable, Affect WNL  ----------------------------------------------------------------------------------------  ASSESSMENT/PLAN  80yFemale with functional deficits after fall sustaining trauma  Right IT fracture s/p ORIF - WBAT    HTN - Norvasc, Atenolol, Losartan  CAD - ASA, Zocor  Smoking cessation - Nicotine patch  Pain - Tylenol, Tramadol, Lidoderm  DVT PPX - SCDs, Lovenox  Rehab - Continue to recommend ACUTE inpatient rehabilitation for the functional deficits consisting of 3 hours of therapy/day & 24 hour RN/daily PMR physician for comorbid medical management. Will continue to follow for ongoing rehab needs and recommendations. Patient will be able to tolerate 3 hours a day.    Continue bedside therapy as well as OOB throughout the day with mobilization throughout the day with staff to maintain cardiopulmonary function and prevention of secondary complications related to debility.     Discussed with rehab clinical team.

## 2021-03-04 NOTE — H&P ADULT - ASSESSMENT
SOY TOVAR is a 80y with a history of HTN, CAD who presented to [facility] on 2/25 with complaint of right hip pain  and found to have right hip IT fracture. Hospital course complicated by postoperative blood loss anemia. Now admitted to [facility] after for initiation of a multidisciplinary rehab program consisting focused on functional mobility, transfers and ADLs (activities of daily living).      Comprehensive Multidisciplinary Rehab Program:  - Start comprehensive rehab program, 3 hours a day, 5 days a week.  - PT 2hr/day: Focused on improving strength, endurance, coordination, balance, functional mobility, and transfers  - OT 1hr/day: Focused on improving strength, fine motor skills, coordination, posture and ADLs.    - Activity Limitations: Decreased social, vocational and leisure activities, decreased self care and ADLs, decreased mobility, decreased ability to manage household and finances.     Participation Restrictions/Precautions:  - Weight bearing status: WBAT  - Precautions:    [x ] Falls   [ ] Spinal   [ ] Hip (Anterior or Posterior)       [ ] Seizure  [ ] Cardiac   [ ] Sternal    Rehab Diagnosis/Management:  Sleep:   - Maintain quiet hours and low stim environment.      Pain Management:  - Tylenol q 6 hrs, tramadol prn, ice packs, lidocaine patch     GI/Bowel:  - At risk for constipation due to neurologic diagnosis, immobility and/or medication use  - Senna QHS  - GI ppx: protonix     /Bladder:   - At risk for incontinence and retention due to neurologic diagnosis and limited mobility  - Currently patient voids:    [x ] independent     [ ] external collection device (condom cath)    [ ] Indwelling villegas catheter     [ ] Intermittent catheterization  - Obtain baseline PVR   - Encourage timed voids every 4 hours while awake for independence and to promote continence during therapy.    Skin/Pressure Injury:   - At risk for pressure injury due to neurologic diagnosis and relative immobility.  - Skin assessment on admission admission: ***  - Monitor Incisions: ***  - Turn every 2 hours while in bed, air mattress  - Soft heel protectors  - Skin barrier cream as needed  - Nursing to monitor skin Qshift    Diet/Dysphagia:  - Diet Consistency/Modifications: Regular diet   - Supplements: Ensure TID       DVT ppx: lovenox   - SCDs  - Last Doppler on N/A    -------------  Comorbid Medical Management:    HTN  -Continue norvasc, losartan and atenolol  -Monitor SBPs     CAD  -Continue ASA and statin         ---------------      Outpatient Follow-up (Specialty/Name of physician): Dr Lora (ortho)    --------------  Goals: Safe discharge to home  Estimated Length of Stay: 10-14 days  Rehab Potential: Good  Medical Prognosis: Good  Estimated Disposition: Home with Home Care  ---------------    PRESCREEN COMPARISON:  I have reviewed the prescreen information and I have found no relevant changes between the preadmission screening and my post admission evaluation.    RATIONALE FOR INPATIENT ADMISSION: Patient demonstrates the following:  [X] Medically appropriate for rehabilitation admission  [X] Has attainable rehab goals with an appropriate initial discharge plan  [X]Has rehabilitation potential (expected to make a significant improvement within a reasonable period of time)  [X] Requires close medical management by a rehab physician, rehab nursing care, Hospitalist and comprehensive interdisciplinary team (including PT, OT and/or SLP, Prosthetics and Orthotics)       SOY TOVAR is a 80y with a history of HTN, CAD who presented to [facility] on 2/25 with complaint of right hip pain  and found to have right hip IT fracture. Hospital course complicated by postoperative blood loss anemia. Now admitted to [facility] after for initiation of a multidisciplinary rehab program consisting focused on functional mobility, transfers and ADLs (activities of daily living).      Comprehensive Multidisciplinary Rehab Program:  - Start comprehensive rehab program, 3 hours a day, 5 days a week.  - PT 2hr/day: Focused on improving strength, endurance, coordination, balance, functional mobility, and transfers  - OT 1hr/day: Focused on improving strength, fine motor skills, coordination, posture and ADLs.    - Activity Limitations: Decreased social, vocational and leisure activities, decreased self care and ADLs, decreased mobility, decreased ability to manage household and finances.     Participation Restrictions/Precautions:  - Weight bearing status: WBAT  - Precautions:    [x ] Falls   [ ] Spinal   [ ] Hip (Anterior or Posterior)       [ ] Seizure  [ ] Cardiac   [ ] Sternal    Rehab Diagnosis/Management:  Sleep:   - Maintain quiet hours and low stim environment.      Pain Management:  - Tylenol q 6 hrs, tramadol prn, ice packs, lidocaine patch     GI/Bowel:  - At risk for constipation due to neurologic diagnosis, immobility and/or medication use  - Senna QHS  - GI ppx: protonix     /Bladder:   - At risk for incontinence and retention due to neurologic diagnosis and limited mobility  - Currently patient voids:    [x ] independent     [ ] external collection device (condom cath)    [ ] Indwelling villegas catheter     [ ] Intermittent catheterization  - Obtain baseline PVR   - Encourage timed voids every 4 hours while awake for independence and to promote continence during therapy.    Skin/Pressure Injury:   - At risk for pressure injury due to neurologic diagnosis and relative immobility.  - Skin assessment on admission admission: 2 small blisters inferior to incision   - Monitor Incisions: 2 hip incisions intact with glue  - Turn every 2 hours while in bed, air mattress  - Soft heel protectors  - Skin barrier cream as needed  - Nursing to monitor skin Qshift    Diet/Dysphagia:  - Diet Consistency/Modifications: Regular diet   - Supplements: Ensure TID       DVT ppx: lovenox   - SCDs  - Last Doppler on N/A    -------------  Comorbid Medical Management:    HTN  -Continue norvasc, losartan and atenolol  -Monitor SBPs     CAD  -Continue ASA and statin         ---------------      Outpatient Follow-up (Specialty/Name of physician): Dr Lora (ortho)    --------------  Goals: Safe discharge to home  Estimated Length of Stay: 10-14 days  Rehab Potential: Good  Medical Prognosis: Good  Estimated Disposition: Home with Home Care  ---------------    PRESCREEN COMPARISON:  I have reviewed the prescreen information and I have found no relevant changes between the preadmission screening and my post admission evaluation.    RATIONALE FOR INPATIENT ADMISSION: Patient demonstrates the following:  [X] Medically appropriate for rehabilitation admission  [X] Has attainable rehab goals with an appropriate initial discharge plan  [X]Has rehabilitation potential (expected to make a significant improvement within a reasonable period of time)  [X] Requires close medical management by a rehab physician, rehab nursing care, Hospitalist and comprehensive interdisciplinary team (including PT, OT and/or SLP, Prosthetics and Orthotics)

## 2021-03-04 NOTE — H&P ADULT - NSHPSOCIALHISTORY_GEN_ALL_CORE
Smoking - everyday smoker, 66 pack yrs  EtOH - None  Drugs - None    FUNCTIONAL HISTORY  Pt reports lives alone in private house with 4 steps to enter with bilateral hand rails + 1 threshold step. Pt states bed/bath is on main level. Pt reports 13 steps down with hand rail to basement where laundry and pantry are located. Pt reports has a neighbor who may be able to assist but only minimally. Pt states does not believe neighbor can assist physically.    FUNCTIONAL PROGRESS as of 3/4  Bed Mobility: min A x 1   Transfer Training Sit-to-Stand Transfer: contact guard  Gait Training: contact guard;  weight-bearing as tolerated   rolling walker;  50 feet

## 2021-03-04 NOTE — DIETITIAN INITIAL EVALUATION ADULT. - ADD RECOMMEND
Rx: MVI and vitamin C 500mg daily. Encourage po intake, monitor diet tolerance, and provide assistance at meals as needed. Obtain daily weights to monitor trends.

## 2021-03-04 NOTE — H&P ADULT - HISTORY OF PRESENT ILLNESS
This is a 80 female with traumatic fall down 2 stairs at home. Landed on her right side and denies head trauma and LOC. Patient reports she was walking down her stairs and her shoes slipped causing her to spin over and fall. Denies hitting her head. Was unable to ambulate after the fall and thus son brought her to [facility]'s ER on 2/25. Pain reported at the right hip when she moved her leg, otherwise no pain anywhere else.   Patient found to have right IT fracture to right hip with comminution of the lesser trochanteric area.   Patient cleared for the OR and underwent right hip IMN on 2/27 by Dr Lora.  Post operatively, acute blood loss anemia noted.   Patient seen by OT, PT and PM&R. Recommendations made for acute IPR.   Patient deemed medically stable for dc to [facility]'s acute IPR on 3/4/2021.       Incidentally: Patient noted to have a 1.1 x 1.0 cm nodule in the right lower lobe medially adjacent to the IVC found on CT of abdomen and chest prior to surgery. Report reads: "While this may be benign, malignancy cannot be excluded. Further evaluation is necessary. If no old films are available for comparison, noncontrast chest CT is recommended to assess for additional nodules."

## 2021-03-04 NOTE — DISCHARGE NOTE NURSING/CASE MANAGEMENT/SOCIAL WORK - NSDCPEEMAIL_GEN_ALL_CORE
[facility] for Tobacco Control email [email or web address]

## 2021-03-04 NOTE — DIETITIAN INITIAL EVALUATION ADULT. - PERTINENT MEDS FT
MEDICATIONS  (STANDING):  acetaminophen   Tablet .. 650 milliGRAM(s) Oral every 6 hours  amLODIPine   Tablet 5 milliGRAM(s) Oral daily  aspirin  chewable 81 milliGRAM(s) Oral daily  ATENolol  Tablet 100 milliGRAM(s) Oral daily  enoxaparin Injectable 40 milliGRAM(s) SubCutaneous daily  lidocaine   Patch 1 Patch Transdermal daily  losartan 100 milliGRAM(s) Oral daily  nicotine -   7 mG/24Hr(s) Patch 1 patch Transdermal daily  senna 2 Tablet(s) Oral at bedtime  simvastatin 20 milliGRAM(s) Oral at bedtime    MEDICATIONS  (PRN):  ondansetron Injectable 4 milliGRAM(s) IV Push every 6 hours PRN Nausea  traMADol 25 milliGRAM(s) Oral every 4 hours PRN Moderate Pain (4 - 6)  traMADol 50 milliGRAM(s) Oral every 4 hours PRN Severe Pain (7 - 10)

## 2021-03-04 NOTE — DISCHARGE NOTE NURSING/CASE MANAGEMENT/SOCIAL WORK - NSDCPEWEB_GEN_ALL_CORE
[facility] for Tobacco Control website --- [email or web address] --- [email or web address]

## 2021-03-04 NOTE — PATIENT PROFILE ADULT - FUNCTIONAL SCREEN CURRENT LEVEL: SWALLOWING (IF SCORE 2 OR MORE FOR ANY ITEM, CONSULT REHAB SERVICES), MLM)
NEUROSURGERY    :  1957,  MRN:  [id number],  CSN:  [id number]  Jsoe Alberto Lorenzo is seen on 2020 in Neurosurgical follow-up at [facility].    SUBJECTIVE:    Mr. Lorenzo is a 62 year old right handed  male presenting with complaint of chronic, stabbing axial low back pain that has progressively worsened since .  He has associated right lateral leg pain that radiates to the top of his right foot.  He has numbness and tingling in the same distribution.  He denies left leg symptoms.  He feels his legs have progressively weakened since 2015 as he has not been as active.  Nothing seems to improve his pain.  He has to frequently change positions to get any sort of comfort.  His pain can come and go and is worse in the morning.  He has done physical therapy and had steroid injections in the past which were not helpful to him.  He has been on OxyContin in the past but is no longer taking narcotic analgesics.     Patient is with unchanged symptoms from last visit, returning with updated imaging.    PHYSICAL EXAM:    Visit Vitals  /78   Ht 6' 4\" (1.93 m)   Wt 133.8 kg   BMI 35.91 kg/m²     Mr. Lorenzo is a 63 year old male in no acute distress.  Exam is stable.      IMAGING:  MRI Lumbar Spine 2015 images reviewed:  Multilevel degenerative disc disease and facet arthropathy,              most significant at L4-5, L5-S1.  Rotoscoliosis.  L4-5 facet arthropathy, moderate right foraminal stenosis.  L5-S1 anterolisthesis Grade II with pars defects,              severe right and moderate left foraminal stenosis.    MRI Lumbar Spine 2020 images reviewed:  General degenerative disk disease, facet arthropathy, rotoscoliosis.  L2-3 moderate central spinal canal stenosis,    extruded disk herniation right lateral recess,   moderate right and marked left foraminal stenosis.  L3-4 mild central spinal canal stenosis,    moderate bilateral  foraminal stenosis.  L4-5 severe facet arthropathy,    moderate right and marked left foraminal stenosis.  L5-S1 anterolisthesis Grade II with pars defects,    severe right and moderate left foraminal stenosis.     _______________________________________________________________    IMPRESSIONs:    1. Spinal stenosis of lumbar region with neurogenic claudication    2. Rotoscoliosis    3. Spondylolisthesis at L5-S1 level    4. Lumbar facet arthropathy    5. DDD (degenerative disc disease), lumbar    6. Right lumbar radiculopathy    7. Chronic bilateral low back pain with right-sided sciatica    8. Severe obesity (BMI 35.0-35.9 with comorbidity) (CMS/[facility])          RECOMMENDATIONs:    Discussed with the patient were surgical and non-surgical management options with their potential risks and benefits for lumbar pathology.  Surgical risks discussed included, though not limited to, death, infection, intra or post-operative hemorrhage, spinal cord or nerve root injury with further neurologic impairment, spinal fluid leak, loss of motion, failure to fuse, failure to improve, and potential need for further intervention at these or other levels.  Mr. Lorenzo appeared to understand and is considering Lumbar Laminectomy L2-S1; Bank Bone Fusion, Stabilization with Instrumentation L4-S1.    If so desired,  Surgery CPTs:  01690 L-Laminectomy, 63048x4 Lami addl levels, 80851 L-Fusion, 55900 Fusion addl level, 44260 local autograft, 20930 Bank Bone and 86969 Seg Fix)   Est Time:  4.5 hours + 30 min setup.  Inpatient -- 2 night stay or longer anticipated  All pre-operative labs, EKG, CXR to be obtained.  Withhold all anticoagulants and antiplatelet medications.  Refer back to primary care physician for preoperative evaluation.       Future Appointments   Date Time Provider Department Center   8/25/2020  3:00 PM Philip Hilton DO [facility]       40 minutes were spent with the patient, majority counseling.     Jose Alberto العلي DO    8/18/2020     cc: Philip Hilton DO             No orders of the defined types were placed in this encounter.     Current Outpatient Medications   Medication Sig Dispense Refill   • pantoprazole (PROTONIX) 40 MG tablet TAKE 1 TABLET BY MOUTH TWICE DAILY HALF HOUR BEFORE BREAKFAST AND DINNER 180 tablet 1   • acetaminophen (TYLENOL) 500 MG tablet Take 2 tablets by mouth every 8 hours.     • amoxicillin (AMOXIL) 500 MG capsule Take 4 capsules by mouth 1 hour prior to dental appointment 4 capsule 0     No current facility-administered medications for this visit.        0 = swallows foods/liquids without difficulty

## 2021-03-04 NOTE — H&P ADULT - NSTOBACCOSCREENHP_GEN_A_CS
"  History     Chief Complaint   Patient presents with     Shortness of Breath     HPI  Jelly Dietz is a 33 year old female who presents with a chief complaint of shortness of breath and feeling she needs dialysis.  She missed her last 2 dialysis appointments because of a holiday and any  she had to go to.  She makes a minimal amount of urine.  She has a history of IgA nephropathy.  She is not coughing any new or worse.  Distress she does feel somewhat short of breath.  She denies any fevers.  No other headache, chest pain, abdominal pain, nausea or vomiting.  Patient does state that she feels itchy as well right now.  I have reviewed the Medications, Allergies, Past Medical and Surgical History, and Social History in the Epic system.    Review of Systems   Constitutional: Negative for chills, diaphoresis and fever.   HENT: Negative for congestion.    Eyes: Negative for visual disturbance.   Respiratory: Positive for shortness of breath. Negative for cough and chest tightness.    Cardiovascular: Negative for chest pain and palpitations.   Gastrointestinal: Negative for abdominal pain, diarrhea, nausea and vomiting.   Genitourinary: Negative for difficulty urinating and dysuria.   Neurological: Negative for dizziness and syncope.   Psychiatric/Behavioral: Negative for behavioral problems and suicidal ideas.       Physical Exam   BP: (!) 133/96  Heart Rate: 72  Temp: 98  F (36.7  C)  Resp: 21  Height: 167.6 cm (5' 6\")  Weight: 53 kg (116 lb 13.5 oz)  SpO2: 96 %      Physical Exam   Constitutional: No distress.   HENT:   Head: Atraumatic.   Mouth/Throat: Oropharynx is clear and moist. No oropharyngeal exudate.   Eyes: EOM are normal. No scleral icterus.   Neck: Neck supple.   Cardiovascular: Normal rate, regular rhythm and normal heart sounds.    Pulmonary/Chest: Breath sounds normal. No respiratory distress.   Abdominal: Soft. She exhibits no distension. There is no tenderness.   Musculoskeletal: She " Yes exhibits no edema or deformity.   Neurological: She is alert.   Skin: Skin is warm and dry. She is not diaphoretic.   Psychiatric: She has a normal mood and affect. Her behavior is normal.       ED Course     ED Course     Procedures             EKG Interpretation:      Interpreted by Erik Samano  Time reviewed: 420  Symptoms at time of EKG: Dyspnea  Rhythm: normal sinus   Rate: normal  Axis: normal  Ectopy: none  Conduction: First degree AV block l  ST Segments/ T Waves: No ST-T wave changes  Q Waves: none  Comparison to prior: Increased OK interval since last EKG in 6/17    Clinical Impression: First degree AV block         Labs Ordered and Resulted from Time of ED Arrival Up to the Time of Departure from the ED   BASIC METABOLIC PANEL - Abnormal; Notable for the following:        Result Value    Sodium 131 (*)     Potassium 7.3 (*)     Carbon Dioxide 19 (*)     Anion Gap 16 (*)     Urea Nitrogen 110 (*)     Creatinine 19.80 (*)     GFR Estimate 2 (*)     GFR Estimate If Black 2 (*)     Calcium 8.3 (*)     All other components within normal limits   CBC WITH PLATELETS DIFFERENTIAL - Abnormal; Notable for the following:     WBC 2.3 (*)     RBC Count 3.72 (*)     MCH 33.3 (*)     Absolute Neutrophil 1.0 (*)     All other components within normal limits   BLOOD GAS VENOUS - Abnormal; Notable for the following:     Ph Venous 7.31 (*)     PCO2 Venous 37 (*)     Bicarbonate Venous 18 (*)     All other components within normal limits   GLUCOSE BY METER   POTASSIUM   CARDIAC CONTINUOUS MONITORING       Consults  Cardiology: Responded (08/24/18 0980)    Assessments & Plan (with Medical Decision Making)   [age]-year-old female presents with a chief complaint of needing dialysis.  BMP shows a severe elevation of potassium at 7.3.  Patient's creatinine is high at 19 which is to be expected with renal failure.  EKG showed similar T waves to prior.  There is not appear to be any changes in the QRS or increasing size of  the T waves.  Discussed with nephrology fellow.  They will arrange for dialysis shortly.  They agreed with current management including albuterol, dextrose and insulin, bicarbonate, Kayexalate, calcium gluconate.  Discussed with internal medicine Dr. Landeros who accepted admission.  Patient reports no difficulty breathing or worsening shortness of breath on reevaluation.  She is in agreement with the plan to admit her for dialysis.  I have reviewed the nursing notes.    I have reviewed the findings, diagnosis, plan and need for follow up with the patient.    New Prescriptions    No medications on file       Final diagnoses:   Hyperkalemia       8/24/2018   [facility], [place], EMERGENCY DEPARTMENT     Erik Samano, DO  08/24/18 0544

## 2021-03-04 NOTE — H&P ADULT - NSHPREVIEWOFSYSTEMS_GEN_ALL_CORE
REVIEW OF SYSTEMS  Constitutional: No fever, No Chills, No fatigue  HEENT: No eye pain, No visual disturbances, No difficulty hearing  Pulm: No cough,  No shortness of breath  Cardio: hx chest pain/angina X1, No palpitations  GI:  No abdominal pain, No nausea, No vomiting, No diarrhea, No constipation  : No dysuria, No frequency, No hematuria  Neuro: No headaches, No memory loss, No loss of strength, No numbness, No tremors  Skin: No itching, No rashes, No lesions   Endo: No temperature intolerance  MSK: No joint pain, No joint swelling, No muscle pain, No Neck or back pain  Psych:  No depression, No anxiety

## 2021-03-04 NOTE — H&P ADULT - ATTENDING COMMENTS
Rehab Attending- patient seen and examined By Me - Case Discussed with FELICIA Correa- Above History, assessment and plan reviewed by me with modifications made    IMP Rehab Right Hip Fx SP ORIF  Good candidate for intensive rehab- will tolerate three hours Rehab daily

## 2021-03-04 NOTE — DIETITIAN NUTRITION RISK NOTIFICATION - TREATMENT: THE FOLLOWING DIET HAS BEEN RECOMMENDED
Diet, Regular:   Supplement Feeding Modality:  Oral  Ensure Enlive Cans or Servings Per Day:  3       Frequency:  Three Times a day (03-01-21 @ 06:03) [Active]

## 2021-03-04 NOTE — PROGRESS NOTE ADULT - ATTENDING COMMENTS
Patient seen and examined. Case discussed with resident. Agree with recommendations.
I have seen and examined the patient    no new issues  H/H stable  PT  dvt chemoprophylaxis  dispo planning
Pt seen and examined by me  agree with findings  ADRI pending
AAox3, NAD,   Cards risk stratifying   Plan  1. OR with ortho  2. NPO, IVF, Analgesia    code [id number]
Agree with above assessment.  The patient was seen and examined by me.  The patient states that she is feeling some incisional pain with movement, but overall feels well.  Continue with PT/OT, pain control, patient trauma stable, d/c planning.
Pt seen and examined by me  agree with findings  awaiting ADRI
Agree with above assessment.   The patient was seen and examined by me.  The patient is with pain in the right hip, she has no abdominal pain.  The patient is stable for the planned ORIF today as per ortho.

## 2021-03-04 NOTE — PROGRESS NOTE ADULT - REASON FOR ADMISSION
Trauma

## 2021-03-04 NOTE — H&P ADULT - NSHPPHYSICALEXAM_GEN_ALL_CORE
PHYSICAL EXAMINATION   VItals: T(C): 36.8 (03-04-21 @ 17:06), Max: 37.1 (03-03-21 @ 19:02)  HR: 74 (03-04-21 @ 17:06) (71 - 80)  BP: 121/77 (03-04-21 @ 17:06) (112/62 - 131/67)  RR: 18 (03-04-21 @ 17:06) (16 - 18)  SpO2: 98% (03-04-21 @ 17:06) (93% - 98%)    General: NAD, Resting Comfortable,                                  HEENT: NC/AT, EOM I, PERRLA, Normal Conjunctivae  Cardio: RRR, Normal S1-S2, No M/G/R                              Pulm: No Respiratory Distress,  Lungs CTAB                        Abdomen: ND/NT, Soft, BS+                                                MSK: No joint swelling, Full ROM                                         Ext: No C/C/E, No calf tenderness    Skin:  right hip incision with glue 2 incisions 2 intact blisters inferior to distal incision                                                             Wounds: none  Decubitus Ulcers: None Present     Neurological Examination    Cognitive: AAO x 3                                                                         Attention: Intact   Judgment: Good evidence of judgement                               Memory: Recall 3 objects immediate and 3 min later      Mood/Affect: wnl                                                                           Communication:  Fluent,  No dysarthria   Swallow: intact  CN II - XII  intact  Coordination: FTN/HTS intact                                                                              Sensory: Intact to light touch, PP and Vibration                                                                                             Tone: normal Throughout   Balance: impaired    Motor    LEFT    UE: SF [5/5], EF [5/5], EE [5/5], WE [5/5],  [wnl]  RIGHT UE: SF [5/5], EF [5/5], EE [5/5], WE [5/5],  [wnl]  LEFT    LE:  HF [5/5], KE [5/5], DF [5/5], EHL [5/5],  PF [5/5]  RIGHT LE:  HF [2/5], KE [4/5], DF [5/5], EHL [5/5],  PF [5/5]      Reflex:  2 + thoroughout, Guardado/Babinski negative

## 2021-03-04 NOTE — DIETITIAN INITIAL EVALUATION ADULT. - ORAL NUTRITION SUPPLEMENTS
Continue Ensure Enlive TID to optimize po intake and provide an additional 350 kcal, 20g protein per serving.

## 2021-03-04 NOTE — PROGRESS NOTE ADULT - PROBLEM SELECTOR PROBLEM 1
Closed fracture of right hip, initial encounter

## 2021-03-04 NOTE — H&P ADULT - NSHPLABSRESULTS_GEN_ALL_CORE
< from: CT Abdomen and Pelvis No Cont (02.26.21 @ 01:52) >       EXAM:  CT ABDOMEN AND PELVIS                          PROCEDURE DATE:  02/26/2021          INTERPRETATION:  CLINICAL INFORMATION: Trauma    COMPARISON: None.    PROCEDURE:  CT of the Abdomen and Pelvis was performed without intravenous contrast.  Intravenous contrast: None.  Oral contrast: None.  Sagittal and coronal reformats were performed.    FINDINGS:  LOWER CHEST: 1.1 x 1.0 cm nodule in the right lower lobe medially adjacent to the IVC seen best on series 3 image 6. Further evaluation is warranted.    LIVER: Within normal limits.  BILE DUCTS: Normal caliber.  GALLBLADDER: Within normal limits.  SPLEEN: Within normal limits.  PANCREAS: Within normal limits.  ADRENALS: 1.7 x 0.9 cm left adrenal nodule measuring -6 Hounsfield units suggesting an adenoma.  KIDNEYS/URETERS: Within normal limits.    BLADDER: Within normal limits.  REPRODUCTIVE ORGANS: Grossly unremarkable    BOWEL: No bowel obstruction. No gross evidence for appendicitis. Diverticulum in the descending colon without gross inflammation.  PERITONEUM: No ascites.  VESSELS: Vascular calcifications  RETROPERITONEUM/LYMPH NODES: No lymphadenopathy.  ABDOMINAL WALL: Within normal limits.  BONES: Comminuted fracture of the right femur in the intertrochanteric region with impaction. There is surrounding hazy soft tissue stranding. Degenerative changes of bone.    IMPRESSION:  Comminuted and impacted fracture of the right femur intertrochanteric region. No acute intra-abdominal injury visualized.    Incidentally noted is a 1.1 x 1.0 cm nodule in the right lower lobe medially adjacent to the IVC. While this may be benign, malignancy cannot be excluded. Further evaluation is necessary. If no old films are available for comparison, noncontrast chest CT is recommended to assess for additional nodules. This was discussed with JADE Arredondo at 8:40 AM on 2/26/2021.              MARTIN ADRIAN MD; Attending Radiologist    < end of copied text >

## 2021-03-04 NOTE — DIETITIAN NUTRITION RISK NOTIFICATION - ADDITIONAL COMMENTS/DIETITIAN RECOMMENDATIONS
Continue diet as tolerated.  Continue Ensure Enlive TID to optimize po intake and provide an additional 350 kcal, 20g protein per serving.  Rx: MVI and vitamin C 500mg daily.   Encourage po intake, monitor diet tolerance, and provide assistance at meals as needed.   Obtain daily weights to monitor trends.

## 2021-03-04 NOTE — DISCHARGE NOTE NURSING/CASE MANAGEMENT/SOCIAL WORK - PATIENT PORTAL LINK FT
You can access the FollowMyHealth Patient Portal offered by [facility] by registering at the following website: [email or web address]. By joining [employer]’s FollowMyHealth portal, you will also be able to view your health information using other applications (apps) compatible with our system.

## 2021-03-04 NOTE — DIETITIAN INITIAL EVALUATION ADULT. - OTHER INFO
80 year old female s/p fall found to have right intertrochanteric fracture now s/p intermedullary nailing. Pt reports fair appetite, states she is typically not a big eater. Breakfast tray observed at bedside, <25% consumed per plate waste. Pt states at home she usually only has a piece of toast and coffee for breakfast. Denies any significant weight changes over the last year or chewing/swallowing difficulty. Emphasized the importance of HBV protein sources and making protein a priority- pt verbalized understanding. States she is drinking Ensure Enlive supplements. NFPE conducted, noted with moderate muscle and mild fat loss. RD to follow up.

## 2021-03-04 NOTE — PROGRESS NOTE ADULT - PROBLEM SELECTOR PLAN 1
- plan for discharge to Acute rehab  - repeated covid negative 3/1  - Incentive Spirometry   - out of bed with assistance   - WBAT  -lovenox for 4 weeks postoperatively
Pain control  PT/OT  discharge planning
to OR today as per ortho

## 2021-03-04 NOTE — PROGRESS NOTE ADULT - SUBJECTIVE AND OBJECTIVE BOX
SUBJECTIVE/24 hour events:  Patient evaluated at bedside, found resting comfortably in bed, nad. No acute complaints or concerns. Denies sob, chest pain, n/v, headaches, dizziness. Hgb remains stable and pain well controlled. Awaiting placement to acute rehab. Will fill out molst form with son today, form at bedside       Vital Signs Last 24 Hrs  T(C): 36.8 (03 Mar 2021 23:24), Max: 37.2 (03 Mar 2021 05:06)  T(F): 98.3 (03 Mar 2021 23:24), Max: 98.9 (03 Mar 2021 05:06)  HR: 73 (03 Mar 2021 23:24) (73 - 83)  BP: 122/72 (03 Mar 2021 23:24) (103/58 - 159/72)  BP(mean): --  RR: 18 (03 Mar 2021 23:24) (18 - 18)  SpO2: 93% (03 Mar 2021 23:24) (92% - 95%)  Drug Dosing Weight    Weight (kg): 49.9 (25 Feb 2021 19:35)  I&O's Detail    02 Mar 2021 07:01  -  03 Mar 2021 07:00  --------------------------------------------------------  IN:  Total IN: 0 mL    OUT:    Voided (mL): 700 mL  Total OUT: 700 mL    Total NET: -700 mL        Allergies    penicillins (Unknown)  sulfa drugs (Unknown)    Intolerances                              11.7   9.15  )-----------( 240      ( 02 Mar 2021 12:48 )             36.3           ROS:    PHYSICAL EXAM:  PE  Gen: resting comfortably in bed, nad, in good spirits   Pulm: no increased wob, no conversational dyspnea  Abd: non-distended, soft, non-tender  Ext: distal extremities warm and well-perfused, no peripheral edema, R thigh soft and non-tender, RLE motor and sensory intact  neuro: A&Ox3   skin: warm, dry and no rashes         MEDICATIONS  (STANDING):  acetaminophen   Tablet .. 650 milliGRAM(s) Oral every 6 hours  amLODIPine   Tablet 5 milliGRAM(s) Oral daily  aspirin  chewable 81 milliGRAM(s) Oral daily  ATENolol  Tablet 100 milliGRAM(s) Oral daily  enoxaparin Injectable 40 milliGRAM(s) SubCutaneous daily  lidocaine   Patch 1 Patch Transdermal daily  losartan 100 milliGRAM(s) Oral daily  nicotine -   7 mG/24Hr(s) Patch 1 patch Transdermal daily  senna 2 Tablet(s) Oral at bedtime  simvastatin 20 milliGRAM(s) Oral at bedtime    MEDICATIONS  (PRN):  ondansetron Injectable 4 milliGRAM(s) IV Push every 6 hours PRN Nausea  traMADol 25 milliGRAM(s) Oral every 4 hours PRN Moderate Pain (4 - 6)  traMADol 50 milliGRAM(s) Oral every 4 hours PRN Severe Pain (7 - 10)      RADIOLOGY STUDIES:    CULTURES:

## 2021-03-04 NOTE — DISCHARGE NOTE NURSING/CASE MANAGEMENT/SOCIAL WORK - CAREGIVER NAME
Rigo Ghosh Stable  /83 mmHg this morning  SBP range 102-141 and DBP 62-83 overnight  Has required one dose in the [facility] in the past 24 hours   -Continue IV enalaprit 0 625mg Q6hrs with low BP parameter of 130mmHg

## 2021-03-04 NOTE — DIETITIAN INITIAL EVALUATION ADULT. - ETIOLOGY
related to inability to meet sufficient protein-energy in setting of advanced age, now with Right femur fx s/p Right IMN

## 2021-03-05 LAB
ALBUMIN SERPL ELPH-MCNC: 2.9 G/DL — LOW (ref 3.3–5)
ALP SERPL-CCNC: 68 U/L — SIGNIFICANT CHANGE UP (ref 40–120)
ALT FLD-CCNC: 38 U/L — SIGNIFICANT CHANGE UP (ref 10–45)
ANION GAP SERPL CALC-SCNC: 11 MMOL/L — SIGNIFICANT CHANGE UP (ref 5–17)
AST SERPL-CCNC: 25 U/L — SIGNIFICANT CHANGE UP (ref 10–40)
BASOPHILS # BLD AUTO: 0.07 K/UL — SIGNIFICANT CHANGE UP (ref 0–0.2)
BASOPHILS NFR BLD AUTO: 0.9 % — SIGNIFICANT CHANGE UP (ref 0–2)
BILIRUB SERPL-MCNC: 0.7 MG/DL — SIGNIFICANT CHANGE UP (ref 0.2–1.2)
BUN SERPL-MCNC: 35 MG/DL — HIGH (ref 7–23)
CALCIUM SERPL-MCNC: 9.3 MG/DL — SIGNIFICANT CHANGE UP (ref 8.4–10.5)
CHLORIDE SERPL-SCNC: 108 MMOL/L — SIGNIFICANT CHANGE UP (ref 96–108)
CO2 SERPL-SCNC: 27 MMOL/L — SIGNIFICANT CHANGE UP (ref 22–31)
CREAT SERPL-MCNC: 1 MG/DL — SIGNIFICANT CHANGE UP (ref 0.5–1.3)
EOSINOPHIL # BLD AUTO: 0.24 K/UL — SIGNIFICANT CHANGE UP (ref 0–0.5)
EOSINOPHIL NFR BLD AUTO: 3.2 % — SIGNIFICANT CHANGE UP (ref 0–6)
GLUCOSE SERPL-MCNC: 96 MG/DL — SIGNIFICANT CHANGE UP (ref 70–99)
HCT VFR BLD CALC: 33.3 % — LOW (ref 34.5–45)
HGB BLD-MCNC: 10.3 G/DL — LOW (ref 11.5–15.5)
IMM GRANULOCYTES NFR BLD AUTO: 0.8 % — SIGNIFICANT CHANGE UP (ref 0–1.5)
LYMPHOCYTES # BLD AUTO: 1.55 K/UL — SIGNIFICANT CHANGE UP (ref 1–3.3)
LYMPHOCYTES # BLD AUTO: 20.8 % — SIGNIFICANT CHANGE UP (ref 13–44)
MCHC RBC-ENTMCNC: 29.3 PG — SIGNIFICANT CHANGE UP (ref 27–34)
MCHC RBC-ENTMCNC: 30.9 GM/DL — LOW (ref 32–36)
MCV RBC AUTO: 94.6 FL — SIGNIFICANT CHANGE UP (ref 80–100)
MONOCYTES # BLD AUTO: 0.94 K/UL — HIGH (ref 0–0.9)
MONOCYTES NFR BLD AUTO: 12.6 % — SIGNIFICANT CHANGE UP (ref 2–14)
NEUTROPHILS # BLD AUTO: 4.6 K/UL — SIGNIFICANT CHANGE UP (ref 1.8–7.4)
NEUTROPHILS NFR BLD AUTO: 61.7 % — SIGNIFICANT CHANGE UP (ref 43–77)
NRBC # BLD: 0 /100 WBCS — SIGNIFICANT CHANGE UP (ref 0–0)
PLATELET # BLD AUTO: 173 K/UL — SIGNIFICANT CHANGE UP (ref 150–400)
POTASSIUM SERPL-MCNC: 4.7 MMOL/L — SIGNIFICANT CHANGE UP (ref 3.5–5.3)
POTASSIUM SERPL-SCNC: 4.7 MMOL/L — SIGNIFICANT CHANGE UP (ref 3.5–5.3)
PROT SERPL-MCNC: 6.6 G/DL — SIGNIFICANT CHANGE UP (ref 6–8.3)
RBC # BLD: 3.52 M/UL — LOW (ref 3.8–5.2)
RBC # FLD: 13.7 % — SIGNIFICANT CHANGE UP (ref 10.3–14.5)
SARS-COV-2 RNA SPEC QL NAA+PROBE: SIGNIFICANT CHANGE UP
SODIUM SERPL-SCNC: 146 MMOL/L — HIGH (ref 135–145)
VIT D25+D1,25 OH+D1,25 PNL SERPL-MCNC: 24.6 PG/ML — SIGNIFICANT CHANGE UP (ref 19.9–79.3)
WBC # BLD: 7.46 K/UL — SIGNIFICANT CHANGE UP (ref 3.8–10.5)
WBC # FLD AUTO: 7.46 K/UL — SIGNIFICANT CHANGE UP (ref 3.8–10.5)

## 2021-03-05 PROCEDURE — 99223 1ST HOSP IP/OBS HIGH 75: CPT

## 2021-03-05 PROCEDURE — 99232 SBSQ HOSP IP/OBS MODERATE 35: CPT | Mod: GC

## 2021-03-05 RX ADMIN — Medication 650 MILLIGRAM(S): at 11:48

## 2021-03-05 RX ADMIN — ATORVASTATIN CALCIUM 10 MILLIGRAM(S): 80 TABLET, FILM COATED ORAL at 21:20

## 2021-03-05 RX ADMIN — Medication 81 MILLIGRAM(S): at 11:47

## 2021-03-05 RX ADMIN — ATENOLOL 100 MILLIGRAM(S): 25 TABLET ORAL at 06:16

## 2021-03-05 RX ADMIN — AMLODIPINE BESYLATE 5 MILLIGRAM(S): 2.5 TABLET ORAL at 06:15

## 2021-03-05 RX ADMIN — LIDOCAINE 1 PATCH: 4 CREAM TOPICAL at 18:03

## 2021-03-05 RX ADMIN — Medication 1 TABLET(S): at 11:47

## 2021-03-05 RX ADMIN — Medication 1 PATCH: at 20:21

## 2021-03-05 RX ADMIN — ENOXAPARIN SODIUM 40 MILLIGRAM(S): 100 INJECTION SUBCUTANEOUS at 11:47

## 2021-03-05 RX ADMIN — Medication 650 MILLIGRAM(S): at 17:54

## 2021-03-05 RX ADMIN — Medication 500 MILLIGRAM(S): at 06:15

## 2021-03-05 RX ADMIN — Medication 650 MILLIGRAM(S): at 06:15

## 2021-03-05 RX ADMIN — PANTOPRAZOLE SODIUM 40 MILLIGRAM(S): 20 TABLET, DELAYED RELEASE ORAL at 06:15

## 2021-03-05 RX ADMIN — Medication 650 MILLIGRAM(S): at 23:57

## 2021-03-05 RX ADMIN — LIDOCAINE 1 PATCH: 4 CREAM TOPICAL at 06:16

## 2021-03-05 RX ADMIN — Medication 650 MILLIGRAM(S): at 06:45

## 2021-03-05 RX ADMIN — Medication 650 MILLIGRAM(S): at 23:13

## 2021-03-05 RX ADMIN — Medication 1 PATCH: at 11:47

## 2021-03-05 RX ADMIN — Medication 650 MILLIGRAM(S): at 12:50

## 2021-03-05 RX ADMIN — LIDOCAINE 1 PATCH: 4 CREAM TOPICAL at 06:29

## 2021-03-05 RX ADMIN — LOSARTAN POTASSIUM 100 MILLIGRAM(S): 100 TABLET, FILM COATED ORAL at 06:15

## 2021-03-05 RX ADMIN — Medication 650 MILLIGRAM(S): at 17:13

## 2021-03-05 RX ADMIN — Medication 500 MILLIGRAM(S): at 17:13

## 2021-03-05 NOTE — DIETITIAN NUTRITION RISK NOTIFICATION - TREATMENT: THE FOLLOWING DIET HAS BEEN RECOMMENDED
Diet, Regular:   Supplement Feeding Modality:  Oral  Ensure Enlive Cans or Servings Per Day:  1       Frequency:  Three Times a day (03-04-21 @ 14:41) [Active]

## 2021-03-05 NOTE — CONSULT NOTE ADULT - SUBJECTIVE AND OBJECTIVE BOX
Dr. Paula Hospitalist Progress Note  SOY TOVAR [id number]    Patient is a 80y old  Female who presents with a chief complaint of 08.11 Status post unilateral hip fracture (04 Mar 2021 14:41)    HPI:  This is a 80 female with traumatic fall down 2 stairs at home. Landed on her right side and denies head trauma and LOC. Patient reports she was walking down her stairs and her shoes slipped causing her to spin over and fall. Denies hitting her head. Was unable to ambulate after the fall and thus son brought her to [facility]'s ER on 2/25. Pain reported at the right hip when she moved her leg, otherwise no pain anywhere else. Patient found to have right IT fracture to right hip with comminution of the lesser trochanteric area.   Patient cleared for the OR and underwent right hip IMN on 2/27 by Dr Lora. Post operatively, acute blood loss anemia noted. Patient seen by OT, PT and PM&R. Recommendations made for acute IPR. Patient deemed medically stable for dc to [facility]'s acute IPR on 3/4/2021.   Incidentally: Patient noted to have a 1.1 x 1.0 cm nodule in the right lower lobe medially adjacent to the IVC found on CT of abdomen and chest prior to surgery. Report reads: "While this may be benign, malignancy cannot be excluded. Further evaluation is necessary. If no old films are available for comparison, noncontrast chest CT is recommended to assess for additional nodules."  (04 Mar 2021 14:41)    Interval  seen and examined  Chart reviewed  no overnight events    no complaints  feeling better.  no pain  BM+      ROS:  CONSTITUTIONAL:  No distress.no fever/chills/fatigue  HEENT:  Eyes:  No diplopia or blurred vision.   CARDIOVASCULAR:  No pressure, squeezing, tightness, heaviness or aching about the chest; no palpitations.no leg swelling, no orthopnea or PND  RESPIRATORY:  no SOB. no wheezing.no cough or sputum.  No hemoptysis  GI: no abd pain, no nausea, no vomiting, no diarrhea, no constipation. No hematochezia or melena  EXT: hip pain controlled. no leg or calf pain  SKIN: no skin break or ulcer. No cellulitis.   CNS: No headaches. No weakness. no numbness. No depression or anxiety. No SI    T(C): 36.8 (03-05-21 @ 08:19), Max: 37.2 (03-04-21 @ 21:16)  HR: 68 (03-05-21 @ 08:19) (68 - 80)  BP: 136/74 (03-05-21 @ 08:19) (112/62 - 136/74)  RR: 15 (03-05-21 @ 08:19) (15 - 18)  SpO2: 97% (03-05-21 @ 08:19) (94% - 98%)  CAPILLARY BLOOD GLUCOSE          Physical Exam:  GENERAL: Not in distress. Alert    HEENT:  Normocephalic and atraumatic. PEARLA,EOMI  NECK: Supple.  No JVD.    CARDIOVASCULAR: RRR S1, S2. No murmur/rubs/gallop  LUNGS: BLAE+, no rales, no wheezing, no rhonchi.    ABDOMEN: ND. Soft,  NT, no guarding / rebound / rigidity. BS normoactive. No CVA tenderness.    BACK: No spine tenderness.  EXTREMITIES: no edema. no leg or calf TP  SKIN: no rash. No skin break or ulcer. No cellulitis.  NEUROLOGIC: AAO*3.strength is symmetric, sensation intact, speech fluent.    PSYCHIATRIC: Calm.  No agitation.    Labs                        10.3   7.46  )-----------( 173      ( 05 Mar 2021 05:30 )             33.3     03-05    146<H>  |  108  |  35<H>  ----------------------------<  96  4.7   |  27  |  1.00    Ca    9.3      05 Mar 2021 05:30    TPro  6.6  /  Alb  2.9<L>  /  TBili  0.7  /  DBili  x   /  AST  25  /  ALT  38  /  AlkPhos  68  03-05     MEDICATIONS  (STANDING):  acetaminophen   Tablet .. 650 milliGRAM(s) Oral every 6 hours  amLODIPine   Tablet 5 milliGRAM(s) Oral daily  ascorbic acid 500 milliGRAM(s) Oral two times a day  aspirin enteric coated 81 milliGRAM(s) Oral daily  ATENolol  Tablet 100 milliGRAM(s) Oral daily  atorvastatin 10 milliGRAM(s) Oral at bedtime  enoxaparin Injectable 40 milliGRAM(s) SubCutaneous daily  lidocaine   Patch 1 Patch Transdermal <User Schedule>  losartan 100 milliGRAM(s) Oral daily  multivitamin 1 Tablet(s) Oral daily  nicotine -   7 mG/24Hr(s) Patch 1 patch Transdermal daily  pantoprazole    Tablet 40 milliGRAM(s) Oral before breakfast  senna 2 Tablet(s) Oral at bedtime    MEDICATIONS  (PRN):  polyethylene glycol 3350 17 Gram(s) Oral daily PRN Constipation  traMADol 50 milliGRAM(s) Oral every 4 hours PRN Severe Pain (7 - 10)  traMADol 25 milliGRAM(s) Oral every 4 hours PRN Moderate Pain (4 - 6)          Dr. Paula Hospitalist Progress Note  SOY TOVAR [id number]    Patient is a 80y old  Female who presents with a chief complaint of 08.11 Status post unilateral hip fracture (04 Mar 2021 14:41)    HPI:  This is a 80 female with traumatic fall down 2 stairs at home. Landed on her right side and denies head trauma and LOC. Patient reports she was walking down her stairs and her shoes slipped causing her to spin over and fall. Denies hitting her head. Was unable to ambulate after the fall and thus son brought her [facility] ER on 2/25. Pain reported at the right hip when she moved her leg, otherwise no pain anywhere else. Patient found to have right IT fracture to right hip with comminution of the lesser trochanteric area.   Patient cleared for the OR and underwent right hip IMN on 2/27 by Dr Lora. Post operatively, acute blood loss anemia noted. Patient seen by OT, PT and PM&R. Recommendations made for acute IPR. Patient deemed medically stable for dc to [facility]'s acute IPR on 3/4/2021.   Incidentally: Patient noted to have a 1.1 x 1.0 cm nodule in the right lower lobe medially adjacent to the IVC found on CT of abdomen and chest prior to surgery. Report reads: "While this may be benign, malignancy cannot be excluded. Further evaluation is necessary. If no old films are available for comparison, noncontrast chest CT is recommended to assess for additional nodules."  (04 Mar 2021 14:41)    Interval  seen and examined  Chart reviewed  no overnight events    no complaints  feeling better.  no pain  BM+      ROS:  CONSTITUTIONAL:  No distress.no fever/chills/fatigue  HEENT:  Eyes:  No diplopia or blurred vision.   CARDIOVASCULAR:  No pressure, squeezing, tightness, heaviness or aching about the chest; no palpitations.no leg swelling, no orthopnea or PND  RESPIRATORY:  no SOB. no wheezing.no cough or sputum.  No hemoptysis  GI: no abd pain, no nausea, no vomiting, no diarrhea, no constipation. No hematochezia or melena  EXT: hip pain controlled. no leg or calf pain  SKIN: no skin break or ulcer. No cellulitis.   CNS: No headaches. No weakness. no numbness. No depression or anxiety. No SI    PAST MEDICAL HISTORY:  CAD (coronary artery disease)     HTN (hypertension).      Social history  Smoking - everyday smoker, 66 pack yrs  EtOH - None  Drugs - None    FUNCTIONAL HISTORY  Pt reports lives alone in private house with 4 steps to enter with bilateral hand rails + 1 threshold step. Pt states bed/bath is on main level. Pt reports 13 steps down with hand rail to basement where laundry and pantry are located. Pt reports has a neighbor who may be able to assist but only minimally. Pt states does not believe neighbor can assist physically.      T(C): 36.8 (03-05-21 @ 08:19), Max: 37.2 (03-04-21 @ 21:16)  HR: 68 (03-05-21 @ 08:19) (68 - 80)  BP: 136/74 (03-05-21 @ 08:19) (112/62 - 136/74)  RR: 15 (03-05-21 @ 08:19) (15 - 18)  SpO2: 97% (03-05-21 @ 08:19) (94% - 98%)  CAPILLARY BLOOD GLUCOSE          Physical Exam:  GENERAL: Not in distress. Alert    HEENT:  Normocephalic and atraumatic. PEARLA,EOMI  NECK: Supple.  No JVD.    CARDIOVASCULAR: RRR S1, S2. No murmur/rubs/gallop  LUNGS: BLAE+, no rales, no wheezing, no rhonchi.    ABDOMEN: ND. Soft,  NT, no guarding / rebound / rigidity. BS normoactive. No CVA tenderness.    BACK: No spine tenderness.  EXTREMITIES: no edema. no leg or calf TP  SKIN: no rash. No skin break or ulcer. No cellulitis.  NEUROLOGIC: AAO*3.strength is symmetric, sensation intact, speech fluent.    PSYCHIATRIC: Calm.  No agitation.    Labs                        10.3   7.46  )-----------( 173      ( 05 Mar 2021 05:30 )             33.3     03-05    146<H>  |  108  |  35<H>  ----------------------------<  96  4.7   |  27  |  1.00    Ca    9.3      05 Mar 2021 05:30    TPro  6.6  /  Alb  2.9<L>  /  TBili  0.7  /  DBili  x   /  AST  25  /  ALT  38  /  AlkPhos  68  03-05     MEDICATIONS  (STANDING):  acetaminophen   Tablet .. 650 milliGRAM(s) Oral every 6 hours  amLODIPine   Tablet 5 milliGRAM(s) Oral daily  ascorbic acid 500 milliGRAM(s) Oral two times a day  aspirin enteric coated 81 milliGRAM(s) Oral daily  ATENolol  Tablet 100 milliGRAM(s) Oral daily  atorvastatin 10 milliGRAM(s) Oral at bedtime  enoxaparin Injectable 40 milliGRAM(s) SubCutaneous daily  lidocaine   Patch 1 Patch Transdermal <User Schedule>  losartan 100 milliGRAM(s) Oral daily  multivitamin 1 Tablet(s) Oral daily  nicotine -   7 mG/24Hr(s) Patch 1 patch Transdermal daily  pantoprazole    Tablet 40 milliGRAM(s) Oral before breakfast  senna 2 Tablet(s) Oral at bedtime    MEDICATIONS  (PRN):  polyethylene glycol 3350 17 Gram(s) Oral daily PRN Constipation  traMADol 50 milliGRAM(s) Oral every 4 hours PRN Severe Pain (7 - 10)  traMADol 25 milliGRAM(s) Oral every 4 hours PRN Moderate Pain (4 - 6)

## 2021-03-05 NOTE — DIETITIAN INITIAL EVALUATION ADULT. - ORAL INTAKE PTA/DIET HISTORY
Pt reports normal appetite PTA on regular diet. Reports eating 3 balanced and nutritious meals/day- reports high vegetable intake. Experienced low appetite due to pain during hospital course at [facility]. She also stated that she didn't receive foods that she liked. Denies chewing/swallowing difficulty. Reports allergy to citrus, pineapple (updated in chart). Pt reports taking MVI, Vitamin B12, Vitamin D3 PTA.

## 2021-03-05 NOTE — PROGRESS NOTE ADULT - ATTENDING COMMENTS
Rehab Attending- Patient seen and examined with NP Sunny- Case discussed, above note reviewed by me with modifications made    doing well  Moved Bowels yesterday voiding well  minimal pain- taking tylenol  Some SOB with stairs  to continue intensive rehab program

## 2021-03-05 NOTE — PROGRESS NOTE ADULT - SUBJECTIVE AND OBJECTIVE BOX
This is a 80 female with traumatic fall down 2 stairs at home. Landed on her right side and denies head trauma and LOC. Patient reports she was walking down her stairs and her shoes slipped causing her to spin over and fall. Denies hitting her head. Was unable to ambulate after the fall and thus son brought her to [facility]'s ER on 2/25. Pain reported at the right hip when she moved her leg, otherwise no pain anywhere else.   Patient found to have right IT fracture to right hip with comminution of the lesser trochanteric area.   Patient cleared for the OR and underwent right hip IMN on 2/27 by Dr Lora.  Post operatively, acute blood loss anemia noted.   Patient seen by OT, PT and PM&R. Recommendations made for acute IPR.   Patient deemed medically stable for dc to [facility]'s acute IPR on 3/4/2021.       Incidentally: Patient noted to have a 1.1 x 1.0 cm nodule in the right lower lobe medially adjacent to the IVC found on CT of abdomen and chest prior to surgery. Report reads: "While this may be benign, malignancy cannot be excluded. Further evaluation is necessary. If no old films are available for comparison, noncontrast chest CT is recommended to assess for additional nodules."       ROS:         MEDICATIONS  (STANDING):  acetaminophen   Tablet .. 650 milliGRAM(s) Oral every 6 hours  amLODIPine   Tablet 5 milliGRAM(s) Oral daily  ascorbic acid 500 milliGRAM(s) Oral two times a day  aspirin enteric coated 81 milliGRAM(s) Oral daily  ATENolol  Tablet 100 milliGRAM(s) Oral daily  atorvastatin 10 milliGRAM(s) Oral at bedtime  enoxaparin Injectable 40 milliGRAM(s) SubCutaneous daily  lidocaine   Patch 1 Patch Transdermal <User Schedule>  losartan 100 milliGRAM(s) Oral daily  multivitamin 1 Tablet(s) Oral daily  nicotine -   7 mG/24Hr(s) Patch 1 patch Transdermal daily  pantoprazole    Tablet 40 milliGRAM(s) Oral before breakfast  senna 2 Tablet(s) Oral at bedtime    MEDICATIONS  (PRN):  polyethylene glycol 3350 17 Gram(s) Oral daily PRN Constipation  traMADol 50 milliGRAM(s) Oral every 4 hours PRN Severe Pain (7 - 10)  traMADol 25 milliGRAM(s) Oral every 4 hours PRN Moderate Pain (4 - 6)      03-05    146<H>  |  108  |  35<H>  ----------------------------<  96  4.7   |  27  |  1.00    Ca    9.3      05 Mar 2021 05:30    TPro  6.6  /  Alb  2.9<L>  /  TBili  0.7  /  DBili  x   /  AST  25  /  ALT  38  /  AlkPhos  68  03-05                            10.3   7.46  )-----------( 173      ( 05 Mar 2021 05:30 )             33.3       COVID-19 PCR . (03.04.21 @ 19:55)    COVID-19 PCR: NotDetec      Vital Signs Last 24 Hrs  T(C): 36.8 (05 Mar 2021 08:19), Max: 37.2 (04 Mar 2021 21:16)  T(F): 98.3 (05 Mar 2021 08:19), Max: 99 (04 Mar 2021 21:16)  HR: 68 (05 Mar 2021 08:19) (68 - 80)  BP: 136/74 (05 Mar 2021 08:19) (120/70 - 136/74)  RR: 15 (05 Mar 2021 08:19) (15 - 18)  SpO2: 97% (05 Mar 2021 08:19) (94% - 98%)      Physical Exam:   General: NAD, Resting Comfortable,                                  HEENT: NC/AT, EOM I, PERRLA, Normal Conjunctivae  Cardio: RRR, Normal S1-S2, No M/G/R                              Pulm: No Respiratory Distress,  Lungs CTAB                        Abdomen: ND/NT, Soft, BS+                                                MSK: No joint swelling, Full ROM                                         Ext: No C/C/E, No calf tenderness    Skin:  right hip incision with glue 2 incisions 2 intact blisters inferior to distal incision                                                             Wounds: none  Decubitus Ulcers: None Present     Neurological Examination    Cognitive: AAO x 3                                                                         Attention: Intact   Judgment: Good evidence of judgement                               Memory: Recall 3 objects immediate and 3 min later      Mood/Affect: wnl                                                                           Communication:  Fluent,  No dysarthria   Swallow: intact  CN II - XII  intact  Coordination: FTN/HTS intact                                                                              Sensory: Intact to light touch, PP and Vibration                                                                                             Tone: normal Throughout   Balance: impaired    Motor    LEFT    UE: SF [5/5], EF [5/5], EE [5/5], WE [5/5],  [wnl]  RIGHT UE: SF [5/5], EF [5/5], EE [5/5], WE [5/5],  [wnl]  LEFT    LE:  HF [5/5], KE [5/5], DF [5/5], EHL [5/5],  PF [5/5]  RIGHT LE:  HF [2/5], KE [4/5], DF [5/5], EHL [5/5],  PF [5/5]      Reflex:  2 + thoroughout, Guardado/Babinski negative      Rehab Eval in progress This is a 80 female with traumatic fall down 2 stairs at home. Landed on her right side and denies head trauma and LOC. Patient reports she was walking down her stairs and her shoes slipped causing her to spin over and fall. Denies hitting her head. Was unable to ambulate after the fall and thus son brought her to [facility]'s ER on 2/25. Pain reported at the right hip when she moved her leg, otherwise no pain anywhere else.   Patient found to have right IT fracture to right hip with comminution of the lesser trochanteric area.   Patient cleared for the OR and underwent right hip IMN on 2/27 by Dr Lora.  Post operatively, acute blood loss anemia noted.   Patient seen by OT, PT and PM&R. Recommendations made for acute IPR.   Patient deemed medically stable for dc to [facility]'s acute IPR on 3/4/2021.       Incidentally: Patient noted to have a 1.1 x 1.0 cm nodule in the right lower lobe medially adjacent to the IVC found on CT of abdomen and chest prior to surgery. Report reads: "While this may be benign, malignancy cannot be excluded. Further evaluation is necessary. If no old films are available for comparison, noncontrast chest CT is recommended to assess for additional nodules."       ROS:   doing well Some Dyspnea with stair training  last BM 3/4   voiding- PVR 3cc this AM  pain controlled with tylenol  no nausea no vomiting  no chest pain  no  headaches, no dizziness        MEDICATIONS  (STANDING):  acetaminophen   Tablet .. 650 milliGRAM(s) Oral every 6 hours  amLODIPine   Tablet 5 milliGRAM(s) Oral daily  ascorbic acid 500 milliGRAM(s) Oral two times a day  aspirin enteric coated 81 milliGRAM(s) Oral daily  ATENolol  Tablet 100 milliGRAM(s) Oral daily  atorvastatin 10 milliGRAM(s) Oral at bedtime  enoxaparin Injectable 40 milliGRAM(s) SubCutaneous daily  lidocaine   Patch 1 Patch Transdermal <User Schedule>  losartan 100 milliGRAM(s) Oral daily  multivitamin 1 Tablet(s) Oral daily  nicotine -   7 mG/24Hr(s) Patch 1 patch Transdermal daily  pantoprazole    Tablet 40 milliGRAM(s) Oral before breakfast  senna 2 Tablet(s) Oral at bedtime    MEDICATIONS  (PRN):  polyethylene glycol 3350 17 Gram(s) Oral daily PRN Constipation  traMADol 50 milliGRAM(s) Oral every 4 hours PRN Severe Pain (7 - 10)  traMADol 25 milliGRAM(s) Oral every 4 hours PRN Moderate Pain (4 - 6)      03-05    146<H>  |  108  |  35<H>  ----------------------------<  96  4.7   |  27  |  1.00    Ca    9.3      05 Mar 2021 05:30    TPro  6.6  /  Alb  2.9<L>  /  TBili  0.7  /  DBili  x   /  AST  25  /  ALT  38  /  AlkPhos  68  03-05                            10.3   7.46  )-----------( 173      ( 05 Mar 2021 05:30 )             33.3       COVID-19 PCR . (03.04.21 @ 19:55)    COVID-19 PCR: NotDetec      Vital Signs Last 24 Hrs  T(C): 36.8 (05 Mar 2021 08:19), Max: 37.2 (04 Mar 2021 21:16)  T(F): 98.3 (05 Mar 2021 08:19), Max: 99 (04 Mar 2021 21:16)  HR: 68 (05 Mar 2021 08:19) (68 - 80)  BP: 136/74 (05 Mar 2021 08:19) (120/70 - 136/74)  RR: 15 (05 Mar 2021 08:19) (15 - 18)  SpO2: 97% (05 Mar 2021 08:19) (94% - 98%)      Physical Exam:   General: NAD, Resting Comfortable,                                  HEENT: NC/AT, EOM I, PERRLA, Normal Conjunctivae  Cardio: RRR, Normal S1-S2, No M/G/R                              Pulm: No Respiratory Distress,  Lungs CTAB                        Abdomen: ND/NT, Soft, BS+                                                MSK: No joint swelling, Full ROM                                         Ext: No C/C/E, No calf tenderness    Skin:  right hip incision with glue 2 incisions 2 intact blisters inferior to distal incision                                                             Wounds: none  Decubitus Ulcers: None Present     Neurological Examination    Cognitive: AAO x 3                                                                         Attention: Intact   Judgment: Good evidence of judgement                               Memory: Recall 3 objects immediate and 3 min later      Mood/Affect: wnl                                                                           Communication:  Fluent,  No dysarthria   Swallow: intact  CN II - XII  intact  Coordination: FTN/HTS intact                                                                              Sensory: Intact to light touch, PP and Vibration                                                                                             Tone: normal Throughout   Balance: impaired    Motor    LEFT    UE: SF [5/5], EF [5/5], EE [5/5], WE [5/5],  [wnl]  RIGHT UE: SF [5/5], EF [5/5], EE [5/5], WE [5/5],  [wnl]  LEFT    LE:  HF [5/5], KE [5/5], DF [5/5], EHL [5/5],  PF [5/5]  RIGHT LE:  HF [2/5], KE [4/5], DF [5/5], EHL [5/5],  PF [5/5]      Reflex:  2 + thoroughout, Guardado/Babinski negative      Rehab Eval in progress

## 2021-03-05 NOTE — DIETITIAN INITIAL EVALUATION ADULT. - OTHER INFO
Pt is a 80y with a history of HTN, CAD  found to have right hip IT fracture. Hospital course complicated by postoperative blood loss anemia.   Pt tolerating diet with report of fair appetite. Stating that her appetite decreased during hospital course 2/2 to pain, which resulted in weight loss. Improving now- states that she enjoyed most of her lunch. Reports UBW 110lbs, compared with current weight (3/4) 119.4lbs ? unsure of accuracy of weights given pt report of weight loss. Suggest new weight. Pt is receptive to Ensure Enlive supplements, but would like to decrease to QD as she states she is "too full" to drink that often. Encouraged pt to prioritize protein intake at meals. Obtained preferences, provided menu and reviewed ordering procedures. Will provide salt pckts TID per pt request to improve food palatability. Pt denies GI distress- last BM 3/4.

## 2021-03-05 NOTE — DIETITIAN INITIAL EVALUATION ADULT. - PERSON TAUGHT/METHOD
Prioritization of protein intake at meals/verbal instruction/patient instructed/teach back - (Patient repeats in own words)

## 2021-03-05 NOTE — DIETITIAN INITIAL EVALUATION ADULT. - ADD RECOMMEND
Continue MVI, Vitamin C. Obtain and honor food preferences as able. Salt pckts TID. Trend weights, labs & PO intake.

## 2021-03-05 NOTE — CONSULT NOTE ADULT - ASSESSMENT
80yFemale with functional deficits after fall sustaining trauma to right hip, right hip IT fracture s/p ORIF. admitted to [facility] on 3/4 for acute rehab    Right IT fracture   s/p ORIF - WBAT    Impaired gait and mobility  - comprehensive rehab as per primary team  - Fall precaution advised      HTN   - c/w Norvasc, Atenolol, Losartan  - monitor BP  - DASH/TLC diet      CAD   - ASA, Zocor    Smoking cessation   - on Nicotine patch  - counselled     acute blood loss Anemia  - h/h stable  - monitor periodically    Elevated BUN  - creatinine normal  - likely dehydration  - encouraged PO hydration    Hypoalbuminemia  protein calorie malnutrition  - add ensure  - Nutrition eval pending      Pain - Tylenol, Tramadol, Lidoderm  DVT PPX - SCDs, Lovenox    d/w primary team    Thanks for allowing us to see this patient. Hospitalist service will continue to follow patient progress with you.   80yFemale with functional deficits after fall sustaining trauma to right hip, right hip IT fracture s/p ORIF. admitted to [facility] on 3/4 for acute rehab    Right IT fracture   s/p ORIF - WBAT    Impaired gait and mobility  - comprehensive rehab as per primary team  - Fall precaution advised      HTN   - c/w Norvasc, Atenolol, Losartan  - monitor BP  - DASH/TLC diet      CAD   - ASA, Zocor    Smoking cessation   - on Nicotine patch  - counselled     acute blood loss Anemia  - h/h stable  - monitor periodically    Elevated BUN  Hypernatremia  - creatinine normal  - likely dehydration  - encouraged PO hydration  - monitor BMP  - D5 if worsening hypernatremia    Hypoalbuminemia  protein calorie malnutrition  - add ensure  - Nutrition eval pending      Pain - Tylenol, Tramadol, Lidoderm  DVT PPX - SCDs, Lovenox    d/w primary team    Thanks for allowing us to see this patient. Hospitalist service will continue to follow patient progress with you.   80yFemale with functional deficits after fall sustaining trauma to right hip, right hip IT fracture s/p ORIF. admitted to [facility] on 3/4 for acute rehab    Right IT fracture   s/p ORIF   Impaired gait and mobility  - comprehensive rehab as per primary team  - WBAT    - pain control as per primary team  - bowel regimen  - Fall precaution advised      HTN   - c/w Norvasc, Atenolol, Losartan  - monitor BP  - DASH/TLC diet      CAD   - ASA, Zocor    Smoking cessation   - on Nicotine patch  - counselled     acute blood loss Anemia  - h/h stable  - monitor periodically    Elevated BUN  Hypernatremia  - creatinine normal  - likely dehydration  - encouraged PO hydration  - monitor BMP  - D5 if worsening hypernatremia    Hypoalbuminemia  protein calorie malnutrition  - add ensure  - Nutrition eval pending      DVT PPX - SCDs, Lovenox    d/w primary team    Thanks for allowing us to see this patient. Hospitalist service will continue to follow patient progress with you.

## 2021-03-05 NOTE — PROGRESS NOTE ADULT - ASSESSMENT
SOY TOVAR is a 80y with a history of HTN, CAD who presented to [facility] on 2/25 with complaint of right hip pain  and found to have right hip IT fracture. Hospital course complicated by postoperative blood loss anemia. Now admitted to [facility] after for initiation of a multidisciplinary rehab program consisting focused on functional mobility, transfers and ADLs (activities of daily living).      Comprehensive Multidisciplinary Rehab Program:  - Start comprehensive rehab program, 3 hours a day, 5 days a week.  - PT 2hr/day: Focused on improving strength, endurance, coordination, balance, functional mobility, and transfers  - OT 1hr/day: Focused on improving strength, fine motor skills, coordination, posture and ADLs.    - Activity Limitations: Decreased social, vocational and leisure activities, decreased self care and ADLs, decreased mobility, decreased ability to manage household and finances.     Participation Restrictions/Precautions:  - Weight bearing status: WBAT  - Precautions:    [x ] Falls   [ ] Spinal   [ ] Hip (Anterior or Posterior)       [ ] Seizure  [ ] Cardiac   [ ] Sternal    Rehab Diagnosis/Management:  Sleep:   - Maintain quiet hours and low stim environment.      Pain Management:  - Tylenol q 6 hrs, tramadol prn, ice packs, lidocaine patch     GI/Bowel:  - At risk for constipation due to neurologic diagnosis, immobility and/or medication use  - Senna QHS  - GI ppx: protonix     /Bladder:   - At risk for incontinence and retention due to neurologic diagnosis and limited mobility  - Currently patient voids:    [x ] independent     [ ] external collection device (condom cath)    [ ] Indwelling villegas catheter     [ ] Intermittent catheterization  - Obtain baseline PVR   - Encourage timed voids every 4 hours while awake for independence and to promote continence during therapy.    Skin/Pressure Injury:   - At risk for pressure injury due to neurologic diagnosis and relative immobility.  - Skin assessment on admission admission: 2 small blisters inferior to incision   - Monitor Incisions: 2 hip incisions intact with glue  - Turn every 2 hours while in bed, air mattress  - Soft heel protectors  - Skin barrier cream as needed  - Nursing to monitor skin Qshift    Diet/Dysphagia:  - Diet Consistency/Modifications: Regular diet   - Supplements: Ensure TID       DVT ppx: lovenox   - SCDs  - Last Doppler on N/A    -------------  Comorbid Medical Management:    HTN  -Continue norvasc, losartan and atenolol  -Monitor SBPs     CAD  -Continue ASA and statin         ---------------      Outpatient Follow-up (Specialty/Name of physician): Dr Lora (ortho)    --------------  Goals: Safe discharge to home  Estimated Length of Stay: 10-14 days  Rehab Potential: Good  Medical Prognosis: Good  Estimated Disposition: Home with Home Care  ---------------   SOY TOVAR is a 80y with a history of HTN, CAD who presented to [facility] on 2/25 with complaint of right hip pain  and found to have right hip IT fracture. Hospital course complicated by postoperative blood loss anemia. Now admitted to [facility] after for initiation of a multidisciplinary rehab program consisting focused on functional mobility, transfers and ADLs (activities of daily living).      Comprehensive Multidisciplinary Rehab Program:  - Start comprehensive rehab program, 3 hours a day, 5 days a week.  - PT 2hr/day: Focused on improving strength, endurance, coordination, balance, functional mobility, and transfers  - OT 1hr/day: Focused on improving strength, fine motor skills, coordination, posture and ADLs.    - Activity Limitations: Decreased social, vocational and leisure activities, decreased self care and ADLs, decreased mobility, decreased ability to manage household and finances.     Participation Restrictions/Precautions:  - Weight bearing status: WBAT  - Precautions:    [x ] Falls   [ ] Spinal   [ ] Hip (Anterior or Posterior)       [ ] Seizure  [ ] Cardiac   [ ] Sternal    Rehab Diagnosis/Management:  Sleep:   - Maintain quiet hours and low stim environment.      Pain Management:  - Tylenol q 6 hrs, tramadol prn, ice packs, lidocaine patch     GI/Bowel:  - At risk for constipation due to neurologic diagnosis, immobility and/or medication use  - Senna QHS  - GI ppx: protonix     /Bladder:   - At risk for incontinence and retention due to neurologic diagnosis and limited mobility  - Currently patient voids:    [x ] independent     [ ] external collection device (condom cath)    [ ] Indwelling villegas catheter     [ ] Intermittent catheterization  - baseline PVR - 3cc  - Encourage timed voids every 4 hours while awake for independence and to promote continence during therapy.    Skin/Pressure Injury:   - At risk for pressure injury due to neurologic diagnosis and relative immobility.  - Skin assessment on admission admission: 2 small blisters inferior to incision   - Monitor Incisions: 2 hip incisions intact with glue  - Turn every 2 hours while in bed, air mattress  - Soft heel protectors  - Skin barrier cream as needed  - Nursing to monitor skin Qshift    Diet/Dysphagia:  - Diet Consistency/Modifications: Regular diet   - Supplements: Ensure TID       DVT ppx: lovenox   - SCDs  - Last Doppler on N/A    -------------  Comorbid Medical Management:    HTN  -Continue norvasc, losartan and atenolol  -Monitor SBPs     CAD  -Continue ASA and statin     OMARI  BUN 35 Cr1.0  encourage fluids  CMP Monday        ---------------      Outpatient Follow-up (Specialty/Name of physician): Dr Lora (ortho)    --------------  Goals: Safe discharge to home  Estimated Length of Stay: 10-14 days  Rehab Potential: Good  Medical Prognosis: Good  Estimated Disposition: Home with Home Care  ---------------

## 2021-03-06 PROCEDURE — 99233 SBSQ HOSP IP/OBS HIGH 50: CPT

## 2021-03-06 PROCEDURE — 99232 SBSQ HOSP IP/OBS MODERATE 35: CPT

## 2021-03-06 RX ADMIN — TRAMADOL HYDROCHLORIDE 50 MILLIGRAM(S): 50 TABLET ORAL at 15:05

## 2021-03-06 RX ADMIN — PANTOPRAZOLE SODIUM 40 MILLIGRAM(S): 20 TABLET, DELAYED RELEASE ORAL at 05:39

## 2021-03-06 RX ADMIN — Medication 1 PATCH: at 06:42

## 2021-03-06 RX ADMIN — Medication 1 TABLET(S): at 11:58

## 2021-03-06 RX ADMIN — ATORVASTATIN CALCIUM 10 MILLIGRAM(S): 80 TABLET, FILM COATED ORAL at 20:49

## 2021-03-06 RX ADMIN — LOSARTAN POTASSIUM 100 MILLIGRAM(S): 100 TABLET, FILM COATED ORAL at 05:39

## 2021-03-06 RX ADMIN — AMLODIPINE BESYLATE 5 MILLIGRAM(S): 2.5 TABLET ORAL at 05:39

## 2021-03-06 RX ADMIN — LIDOCAINE 1 PATCH: 4 CREAM TOPICAL at 19:00

## 2021-03-06 RX ADMIN — Medication 650 MILLIGRAM(S): at 23:59

## 2021-03-06 RX ADMIN — SENNA PLUS 2 TABLET(S): 8.6 TABLET ORAL at 20:48

## 2021-03-06 RX ADMIN — Medication 650 MILLIGRAM(S): at 05:39

## 2021-03-06 RX ADMIN — Medication 650 MILLIGRAM(S): at 11:57

## 2021-03-06 RX ADMIN — TRAMADOL HYDROCHLORIDE 50 MILLIGRAM(S): 50 TABLET ORAL at 20:47

## 2021-03-06 RX ADMIN — Medication 650 MILLIGRAM(S): at 12:12

## 2021-03-06 RX ADMIN — Medication 81 MILLIGRAM(S): at 11:58

## 2021-03-06 RX ADMIN — TRAMADOL HYDROCHLORIDE 50 MILLIGRAM(S): 50 TABLET ORAL at 21:27

## 2021-03-06 RX ADMIN — Medication 1 PATCH: at 11:58

## 2021-03-06 RX ADMIN — TRAMADOL HYDROCHLORIDE 50 MILLIGRAM(S): 50 TABLET ORAL at 16:45

## 2021-03-06 RX ADMIN — LIDOCAINE 1 PATCH: 4 CREAM TOPICAL at 05:39

## 2021-03-06 RX ADMIN — Medication 1 PATCH: at 11:56

## 2021-03-06 RX ADMIN — ENOXAPARIN SODIUM 40 MILLIGRAM(S): 100 INJECTION SUBCUTANEOUS at 11:58

## 2021-03-06 RX ADMIN — LIDOCAINE 1 PATCH: 4 CREAM TOPICAL at 06:41

## 2021-03-06 RX ADMIN — Medication 650 MILLIGRAM(S): at 17:51

## 2021-03-06 RX ADMIN — Medication 650 MILLIGRAM(S): at 06:41

## 2021-03-06 RX ADMIN — Medication 500 MILLIGRAM(S): at 17:51

## 2021-03-06 RX ADMIN — Medication 1 PATCH: at 20:50

## 2021-03-06 RX ADMIN — Medication 500 MILLIGRAM(S): at 05:39

## 2021-03-06 RX ADMIN — ATENOLOL 100 MILLIGRAM(S): 25 TABLET ORAL at 05:39

## 2021-03-06 RX ADMIN — Medication 650 MILLIGRAM(S): at 18:50

## 2021-03-06 NOTE — PROGRESS NOTE ADULT - ASSESSMENT
80yFemale with functional deficits after fall sustaining trauma to right hip, right hip IT fracture s/p ORIF. admitted to [facility] on 3/4 for acute rehab    Right IT fracture   s/p ORIF   Impaired gait and mobility  - comprehensive rehab as per primary team  - WBAT    - pain control as per primary team  - bowel regimen  - Fall precaution advised      HTN   - c/w Norvasc, Atenolol, Losartan  - monitor BP  - DASH/TLC diet      CAD   - ASA, Zocor    Smoking cessation   - on Nicotine patch  - counselled     acute blood loss Anemia  - h/h stable  - monitor periodically    Elevated BUN  Hypernatremia  - creatinine normal  - likely dehydration  - encouraged PO hydration  - Will consider D5W if still hypernatremia     Hypoalbuminemia  protein calorie malnutrition  - add ensure  - Nutrition eval pending    DVT PPX - SCDs, Lovenox

## 2021-03-06 NOTE — PROGRESS NOTE ADULT - SUBJECTIVE AND OBJECTIVE BOX
Hospitalist: Dario Vasquez DO    CHIEF COMPLAINT: Patient is a 80y old  female who presents with a chief complaint of 08.11 Status post unilateral hip fracture (06 Mar 2021 08:23)    SUBJECTIVE / OVERNIGHT EVENTS: Patient seen and examined. No acute events overnight. Pain well controlled and patient without any complaints.    MEDICATIONS  (STANDING):  acetaminophen   Tablet .. 650 milliGRAM(s) Oral every 6 hours  amLODIPine   Tablet 5 milliGRAM(s) Oral daily  ascorbic acid 500 milliGRAM(s) Oral two times a day  aspirin enteric coated 81 milliGRAM(s) Oral daily  ATENolol  Tablet 100 milliGRAM(s) Oral daily  atorvastatin 10 milliGRAM(s) Oral at bedtime  enoxaparin Injectable 40 milliGRAM(s) SubCutaneous daily  lidocaine   Patch 1 Patch Transdermal <User Schedule>  losartan 100 milliGRAM(s) Oral daily  multivitamin 1 Tablet(s) Oral daily  nicotine -   7 mG/24Hr(s) Patch 1 patch Transdermal daily  pantoprazole    Tablet 40 milliGRAM(s) Oral before breakfast  senna 2 Tablet(s) Oral at bedtime    MEDICATIONS  (PRN):  polyethylene glycol 3350 17 Gram(s) Oral daily PRN Constipation  traMADol 50 milliGRAM(s) Oral every 4 hours PRN Severe Pain (7 - 10)  traMADol 25 milliGRAM(s) Oral every 4 hours PRN Moderate Pain (4 - 6)      VITALS:  T(F): 98.3 (03-06-21 @ 07:45), Max: 99.2 (03-05-21 @ 21:20)  HR: 65 (03-06-21 @ 07:45) (65 - 74)  BP: 112/58 (03-06-21 @ 07:45) (109/55 - 129/66)  RR: 16 (03-06-21 @ 07:45) (15 - 16)  SpO2: 96% (03-06-21 @ 07:45)      REVIEW OF SYSTEMS:  For ROV please refer back to H&P     PHYSICAL EXAM:  General: NAD, Resting Comfortable,                                  HEENT: NC/AT, Normal Conjunctivae  Cardio: RRR, Normal S1-S2                              Pulm: No Respiratory Distress,  Lungs CTAB                        Abdomen: ND/NT, Soft, BS+                                                MSK: No joint swelling, Full ROM                                         Ext: No C/C/E, No calf tenderness        LABS:              10.3                 146  | 27   | 35           7.46  >-----------< 173     ------------------------< 96                    33.3                 4.7  | 108  | 1.00                                         Ca 9.3   Mg x     Ph x           TPro  6.6  /  Alb  2.9      TBili  0.7  /  DBili  x         AST  25  /  ALT  38            AlkPhos  68            RADIOLOGY & ADDITIONAL TESTS:    Imaging Personally Reviewed:    [X] Consultant(s) Notes Reviewed:  [X] Care Discussed with Consultants/Other Providers:

## 2021-03-06 NOTE — PROGRESS NOTE ADULT - SUBJECTIVE AND OBJECTIVE BOX
This is a 80 female with traumatic fall down 2 stairs at home. Landed on her right side and denies head trauma and LOC. Patient reports she was walking down her stairs and her shoes slipped causing her to spin over and fall. Denies hitting her head. Was unable to ambulate after the fall and thus son brought her to [facility]'s ER on 2/25. Pain reported at the right hip when she moved her leg, otherwise no pain anywhere else.   Patient found to have right IT fracture to right hip with comminution of the lesser trochanteric area.   Patient cleared for the OR and underwent right hip IMN on 2/27 by Dr Lora.  Post operatively, acute blood loss anemia noted.   Patient seen by OT, PT and PM&R. Recommendations made for acute IPR.   Patient deemed medically stable for dc to [facility]'s acute IPR on 3/4/2021.       Incidentally: Patient noted to have a 1.1 x 1.0 cm nodule in the right lower lobe medially adjacent to the IVC found on CT of abdomen and chest prior to surgery. Report reads: "While this may be benign, malignancy cannot be excluded. Further evaluation is necessary. If no old films are available for comparison, noncontrast chest CT is recommended to assess for additional nodules."       Subjective/ROS: Patient seen and examined this morning. Patient in bed in NAD, no SOB, no n/v, pain controlled.  pain controlled with tylenol  no nausea no vomiting  no chest pain  no  headaches, no dizziness        MEDICATIONS  (STANDING):  acetaminophen   Tablet .. 650 milliGRAM(s) Oral every 6 hours  amLODIPine   Tablet 5 milliGRAM(s) Oral daily  ascorbic acid 500 milliGRAM(s) Oral two times a day  aspirin enteric coated 81 milliGRAM(s) Oral daily  ATENolol  Tablet 100 milliGRAM(s) Oral daily  atorvastatin 10 milliGRAM(s) Oral at bedtime  enoxaparin Injectable 40 milliGRAM(s) SubCutaneous daily  lidocaine   Patch 1 Patch Transdermal <User Schedule>  losartan 100 milliGRAM(s) Oral daily  multivitamin 1 Tablet(s) Oral daily  nicotine -   7 mG/24Hr(s) Patch 1 patch Transdermal daily  pantoprazole    Tablet 40 milliGRAM(s) Oral before breakfast  senna 2 Tablet(s) Oral at bedtime    MEDICATIONS  (PRN):  polyethylene glycol 3350 17 Gram(s) Oral daily PRN Constipation  traMADol 50 milliGRAM(s) Oral every 4 hours PRN Severe Pain (7 - 10)  traMADol 25 milliGRAM(s) Oral every 4 hours PRN Moderate Pain (4 - 6)  LABS:      Ca    9.3        05 Mar 2021 05:30          03-05    146<H>  |  108  |  35<H>  ----------------------------<  96  4.7   |  27  |  1.00    Ca    9.3      05 Mar 2021 05:30    TPro  6.6  /  Alb  2.9<L>  /  TBili  0.7  /  DBili  x   /  AST  25  /  ALT  38  /  AlkPhos  68  03-05                            10.3   7.46  )-----------( 173      ( 05 Mar 2021 05:30 )             33.3       COVID-19 PCR . (03.04.21 @ 19:55)    COVID-19 PCR: NotDetec      Vital Signs Last 24 Hrs  T(C): 36.8 (06 Mar 2021 07:45), Max: 37.3 (05 Mar 2021 21:20)  T(F): 98.3 (06 Mar 2021 07:45), Max: 99.2 (05 Mar 2021 21:20)  HR: 65 (06 Mar 2021 07:45) (65 - 74)  BP: 112/58 (06 Mar 2021 07:45) (109/55 - 129/66)  BP(mean): --  RR: 16 (06 Mar 2021 07:45) (15 - 16)  SpO2: 96% (06 Mar 2021 07:45) (96% - 96%)      Physical Exam:   General: NAD, Resting Comfortable,                                  HEENT: NC/AT, Normal Conjunctivae  Cardio: RRR, Normal S1-S2                              Pulm: No Respiratory Distress,  Lungs CTAB                        Abdomen: ND/NT, Soft, BS+                                                MSK: No joint swelling, Full ROM                                         Ext: No C/C/E, No calf tenderness    Skin:  right hip incision with glue 2 incisions 2 intact blisters inferior to distal incision                                                             Decubitus Ulcers: None Present     Neurological Examination    Cognitive: AAO x 3                                                                         Mood/Affect: wnl                                                                           Communication:  Fluent,  No dysarthria   Coordination: FTN/HTS intact                                                                              Sensory: Intact to light touch, PP and Vibration                                                                                             Tone: normal Throughout     Motor    LEFT    UE: SF [5/5], EF [5/5], EE [5/5], WE [5/5],  [wnl]  RIGHT UE: SF [5/5], EF [5/5], EE [5/5], WE [5/5],  [wnl]  LEFT    LE:  HF [5/5], KE [5/5], DF [5/5], EHL [5/5],  PF [5/5]  RIGHT LE:  HF [3-/5], KE [4+/5], DF [5/5], EHL [5/5],  PF [5/5]      Reflex:  2 + thoroughout,

## 2021-03-06 NOTE — PROGRESS NOTE ADULT - ASSESSMENT
SOY TOVAR is a 80y with a history of HTN, CAD who presented to [facility] on 2/25 with complaint of right hip pain  and found to have right hip IT fracture. Hospital course complicated by postoperative blood loss anemia. Now admitted to [facility] after for initiation of a multidisciplinary rehab program consisting focused on functional mobility, transfers and ADLs (activities of daily living).      Comprehensive Multidisciplinary Rehab Program:  - Continue comprehensive rehab program, 3 hours a day, 5 days a week.  - PT 2hr/day: Focused on improving strength, endurance, coordination, balance, functional mobility, and transfers  - OT 1hr/day: Focused on improving strength, fine motor skills, coordination, posture and ADLs.    - Activity Limitations: Decreased social, vocational and leisure activities, decreased self care and ADLs, decreased mobility, decreased ability to manage household and finances.     Participation Restrictions/Precautions:  - Weight bearing status: WBAT  - Precautions:    [x ] Falls   [ ] Spinal   [ ] Hip (Anterior or Posterior)       [ ] Seizure  [ ] Cardiac   [ ] Sternal    Rehab Diagnosis/Management:  Sleep:   - Maintain quiet hours and low stim environment.      Pain Management:  - Tylenol q 6 hrs, tramadol prn, ice packs, lidocaine patch     GI/Bowel:  - At risk for constipation due to neurologic diagnosis, immobility and/or medication use  - Senna QHS  - GI ppx: protonix     /Bladder:   - At risk for incontinence and retention due to neurologic diagnosis and limited mobility  - Currently patient voids:    [x ] independent     [ ] external collection device (condom cath)    [ ] Indwelling villegas catheter     [ ] Intermittent catheterization  - baseline PVR - 3cc  - Encourage timed voids every 4 hours while awake for independence and to promote continence during therapy.    Skin/Pressure Injury:   - At risk for pressure injury due to neurologic diagnosis and relative immobility.  - Skin assessment on admission admission: 2 small blisters inferior to incision   - Monitor Incisions: 2 hip incisions intact with glue  - Turn every 2 hours while in bed, air mattress  - Soft heel protectors  - Skin barrier cream as needed  - Nursing to monitor skin Qshift    Diet/Dysphagia:  - Diet Consistency/Modifications: Regular diet   - Supplements: Ensure TID       DVT ppx: lovenox   - SCDs  - Last Doppler on N/A    -------------  Comorbid Medical Management:    HTN  -Continue norvasc, losartan and atenolol  -Monitor SBPs     Hypernatremia:  -encourage fluids  -BMP 3/7  -Hospitalist consult appreciated    CAD  -Continue ASA and statin     Labs:  BMP 3/7  CBC, CMP 3/8          ---------------      Outpatient Follow-up (Specialty/Name of physician): Dr Lora (ortho)    --------------    ---------------

## 2021-03-07 LAB
ALBUMIN SERPL ELPH-MCNC: 2.8 G/DL — LOW (ref 3.3–5)
ALP SERPL-CCNC: 78 U/L — SIGNIFICANT CHANGE UP (ref 40–120)
ALT FLD-CCNC: 30 U/L — SIGNIFICANT CHANGE UP (ref 10–45)
ANION GAP SERPL CALC-SCNC: 7 MMOL/L — SIGNIFICANT CHANGE UP (ref 5–17)
AST SERPL-CCNC: 23 U/L — SIGNIFICANT CHANGE UP (ref 10–40)
BILIRUB SERPL-MCNC: 0.5 MG/DL — SIGNIFICANT CHANGE UP (ref 0.2–1.2)
BUN SERPL-MCNC: 32 MG/DL — HIGH (ref 7–23)
CALCIUM SERPL-MCNC: 9 MG/DL — SIGNIFICANT CHANGE UP (ref 8.4–10.5)
CHLORIDE SERPL-SCNC: 109 MMOL/L — HIGH (ref 96–108)
CO2 SERPL-SCNC: 28 MMOL/L — SIGNIFICANT CHANGE UP (ref 22–31)
CREAT SERPL-MCNC: 1.11 MG/DL — SIGNIFICANT CHANGE UP (ref 0.5–1.3)
GLUCOSE SERPL-MCNC: 85 MG/DL — SIGNIFICANT CHANGE UP (ref 70–99)
HCT VFR BLD CALC: 31 % — LOW (ref 34.5–45)
HGB BLD-MCNC: 9.8 G/DL — LOW (ref 11.5–15.5)
MCHC RBC-ENTMCNC: 30 PG — SIGNIFICANT CHANGE UP (ref 27–34)
MCHC RBC-ENTMCNC: 31.6 GM/DL — LOW (ref 32–36)
MCV RBC AUTO: 94.8 FL — SIGNIFICANT CHANGE UP (ref 80–100)
NRBC # BLD: 0 /100 WBCS — SIGNIFICANT CHANGE UP (ref 0–0)
PLATELET # BLD AUTO: 106 K/UL — LOW (ref 150–400)
POTASSIUM SERPL-MCNC: 4.2 MMOL/L — SIGNIFICANT CHANGE UP (ref 3.5–5.3)
POTASSIUM SERPL-SCNC: 4.2 MMOL/L — SIGNIFICANT CHANGE UP (ref 3.5–5.3)
PROT SERPL-MCNC: 6.2 G/DL — SIGNIFICANT CHANGE UP (ref 6–8.3)
RBC # BLD: 3.27 M/UL — LOW (ref 3.8–5.2)
RBC # FLD: 13.5 % — SIGNIFICANT CHANGE UP (ref 10.3–14.5)
SODIUM SERPL-SCNC: 144 MMOL/L — SIGNIFICANT CHANGE UP (ref 135–145)
WBC # BLD: 6.66 K/UL — SIGNIFICANT CHANGE UP (ref 3.8–10.5)
WBC # FLD AUTO: 6.66 K/UL — SIGNIFICANT CHANGE UP (ref 3.8–10.5)

## 2021-03-07 PROCEDURE — 99232 SBSQ HOSP IP/OBS MODERATE 35: CPT

## 2021-03-07 RX ADMIN — Medication 650 MILLIGRAM(S): at 00:30

## 2021-03-07 RX ADMIN — Medication 1 PATCH: at 11:58

## 2021-03-07 RX ADMIN — ATENOLOL 100 MILLIGRAM(S): 25 TABLET ORAL at 05:49

## 2021-03-07 RX ADMIN — LIDOCAINE 1 PATCH: 4 CREAM TOPICAL at 07:57

## 2021-03-07 RX ADMIN — PANTOPRAZOLE SODIUM 40 MILLIGRAM(S): 20 TABLET, DELAYED RELEASE ORAL at 05:48

## 2021-03-07 RX ADMIN — LIDOCAINE 1 PATCH: 4 CREAM TOPICAL at 17:43

## 2021-03-07 RX ADMIN — Medication 1 PATCH: at 21:58

## 2021-03-07 RX ADMIN — ATORVASTATIN CALCIUM 10 MILLIGRAM(S): 80 TABLET, FILM COATED ORAL at 21:56

## 2021-03-07 RX ADMIN — ENOXAPARIN SODIUM 40 MILLIGRAM(S): 100 INJECTION SUBCUTANEOUS at 11:52

## 2021-03-07 RX ADMIN — Medication 650 MILLIGRAM(S): at 23:13

## 2021-03-07 RX ADMIN — Medication 650 MILLIGRAM(S): at 12:26

## 2021-03-07 RX ADMIN — Medication 1 PATCH: at 08:20

## 2021-03-07 RX ADMIN — Medication 650 MILLIGRAM(S): at 11:52

## 2021-03-07 RX ADMIN — LOSARTAN POTASSIUM 100 MILLIGRAM(S): 100 TABLET, FILM COATED ORAL at 05:49

## 2021-03-07 RX ADMIN — Medication 1 PATCH: at 11:54

## 2021-03-07 RX ADMIN — Medication 650 MILLIGRAM(S): at 06:30

## 2021-03-07 RX ADMIN — Medication 500 MILLIGRAM(S): at 17:43

## 2021-03-07 RX ADMIN — Medication 650 MILLIGRAM(S): at 05:48

## 2021-03-07 RX ADMIN — Medication 81 MILLIGRAM(S): at 11:53

## 2021-03-07 RX ADMIN — Medication 650 MILLIGRAM(S): at 17:43

## 2021-03-07 RX ADMIN — Medication 500 MILLIGRAM(S): at 05:48

## 2021-03-07 RX ADMIN — LIDOCAINE 1 PATCH: 4 CREAM TOPICAL at 05:49

## 2021-03-07 RX ADMIN — AMLODIPINE BESYLATE 5 MILLIGRAM(S): 2.5 TABLET ORAL at 05:49

## 2021-03-07 RX ADMIN — SENNA PLUS 2 TABLET(S): 8.6 TABLET ORAL at 21:56

## 2021-03-07 RX ADMIN — Medication 1 TABLET(S): at 11:53

## 2021-03-07 NOTE — PROGRESS NOTE ADULT - ASSESSMENT
80yFemale with functional deficits after fall sustaining trauma to right hip, right hip IT fracture s/p ORIF. admitted to [facility] on 3/4 for acute rehab    Right IT fracture   s/p ORIF   Impaired gait and mobility  - comprehensive rehab as per primary team  - WBAT    - pain control as per primary team  - bowel regimen  - Fall precaution advised      HTN   - c/w Norvasc, Atenolol, Losartan  - monitor BP  - DASH/TLC diet      CAD   - ASA, Zocor    Smoking cessation   - on Nicotine patch  - counselled     acute blood loss Anemia  - h/h stable  - monitor periodically    Elevated BUN  Hypernatremia  - creatinine normal  - likely dehydration  - encouraged PO hydration    Hypoalbuminemia  protein calorie malnutrition  - add ensure  - Nutrition eval pending    DVT PPX - SCDs, Lovenox 80yFemale with functional deficits after fall sustaining trauma to right hip, right hip IT fracture s/p ORIF. admitted to [facility] on 3/4 for acute rehab    Right IT fracture   s/p ORIF   Impaired gait and mobility  - comprehensive rehab as per primary team  - WBAT    - pain control as per primary team  - bowel regimen  - Fall precaution advised      HTN   - c/w Norvasc, Atenolol, Losartan  - monitor BP  - DASH/TLC diet      CAD   - ASA, Zocor    Smoking cessation   - on Nicotine patch  - counselled     acute blood loss Anemia  - h/h stable  - monitor periodically  - Monitor for thrombocytopenia     Elevated BUN  Hypernatremia  - creatinine normal  - likely dehydration  - encouraged PO hydration    Hypoalbuminemia  protein calorie malnutrition  - add ensure  - Nutrition eval pending    DVT PPX - SCDs, Lovenox

## 2021-03-07 NOTE — PROGRESS NOTE ADULT - SUBJECTIVE AND OBJECTIVE BOX
This is a 80 female with traumatic fall down 2 stairs at home. Landed on her right side and denies head trauma and LOC. Patient reports she was walking down her stairs and her shoes slipped causing her to spin over and fall. Denies hitting her head. Was unable to ambulate after the fall and thus son brought her to [facility]'s ER on 2/25. Pain reported at the right hip when she moved her leg, otherwise no pain anywhere else.   Patient found to have right IT fracture to right hip with comminution of the lesser trochanteric area.   Patient cleared for the OR and underwent right hip IMN on 2/27 by Dr Lora.  Post operatively, acute blood loss anemia noted.   Patient seen by OT, PT and PM&R. Recommendations made for acute IPR.   Patient deemed medically stable for dc to [facility]'s acute IPR on 3/4/2021.       Incidentally: Patient noted to have a 1.1 x 1.0 cm nodule in the right lower lobe medially adjacent to the IVC found on CT of abdomen and chest prior to surgery. Report reads: "While this may be benign, malignancy cannot be excluded. Further evaluation is necessary. If no old films are available for comparison, noncontrast chest CT is recommended to assess for additional nodules."       Subjective/ROS: Patient seen and examined this morning. Patient in bed in NAD, no SOB, no n/v, pain controlled.  pain controlled with tylenol  no nausea no vomiting  no chest pain  no  headaches, no dizziness        MEDICATIONS  (STANDING):  acetaminophen   Tablet .. 650 milliGRAM(s) Oral every 6 hours  amLODIPine   Tablet 5 milliGRAM(s) Oral daily  ascorbic acid 500 milliGRAM(s) Oral two times a day  aspirin enteric coated 81 milliGRAM(s) Oral daily  ATENolol  Tablet 100 milliGRAM(s) Oral daily  atorvastatin 10 milliGRAM(s) Oral at bedtime  enoxaparin Injectable 40 milliGRAM(s) SubCutaneous daily  lidocaine   Patch 1 Patch Transdermal <User Schedule>  losartan 100 milliGRAM(s) Oral daily  multivitamin 1 Tablet(s) Oral daily  nicotine -   7 mG/24Hr(s) Patch 1 patch Transdermal daily  pantoprazole    Tablet 40 milliGRAM(s) Oral before breakfast  senna 2 Tablet(s) Oral at bedtime    MEDICATIONS  (PRN):  polyethylene glycol 3350 17 Gram(s) Oral daily PRN Constipation  traMADol 50 milliGRAM(s) Oral every 4 hours PRN Severe Pain (7 - 10)  traMADol 25 milliGRAM(s) Oral every 4 hours PRN Moderate Pain (4 - 6)      LABS:  LABS:                        9.8    6.66  )-----------( 106      ( 07 Mar 2021 05:45 )             31.0     07 Mar 2021 05:45    144    |  109    |  32     ----------------------------<  85     4.2     |  28     |  1.11     Ca    9.0        07 Mar 2021 05:45    TPro  6.2    /  Alb  2.8    /  TBili  0.5    /  DBili  x      /  AST  23     /  ALT  30     /  AlkPhos  78     07 Mar 2021 05:45          Ca    9.3        05 Mar 2021 05:30          03-05    146<H>  |  108  |  35<H>  ----------------------------<  96  4.7   |  27  |  1.00    Ca    9.3      05 Mar 2021 05:30    TPro  6.6  /  Alb  2.9<L>  /  TBili  0.7  /  DBili  x   /  AST  25  /  ALT  38  /  AlkPhos  68  03-05                            10.3   7.46  )-----------( 173      ( 05 Mar 2021 05:30 )             33.3       COVID-19 PCR . (03.04.21 @ 19:55)    COVID-19 PCR: NotDetec      Vital Signs Last 24 Hrs  T(C): 36.7 (07 Mar 2021 08:17), Max: 37.2 (06 Mar 2021 19:45)  T(F): 98.1 (07 Mar 2021 08:17), Max: 98.9 (06 Mar 2021 19:45)  HR: 58 (07 Mar 2021 08:17) (58 - 68)  BP: 109/56 (07 Mar 2021 08:17) (109/56 - 146/71)  BP(mean): --  RR: 16 (07 Mar 2021 08:17) (16 - 16)  SpO2: 99% (07 Mar 2021 08:17) (95% - 99%)      Physical Exam:   General: NAD, Resting Comfortable,                                  HEENT: NC/AT, Normal Conjunctivae  Cardio: RRR, Normal S1-S2                              Pulm: No Respiratory Distress,  Lungs CTAB                        Abdomen: ND/NT, Soft, BS+                                                MSK: No joint swelling, Full ROM                                         Ext: No C/C/E, No calf tenderness                                                       Neurological Examination    Cognitive: AAO x 3                                                                         Mood/Affect: wnl                                                                           Communication:  Fluent,  No dysarthria   Coordination: FTN/HTS intact                                                                              Sensory: Intact to light touch                                                                                          Tone: normal Throughout     Motor    LEFT    UE: SF [5/5], EF [5/5], EE [5/5], WE [5/5],  [wnl]  RIGHT UE: SF [5/5], EF [5/5], EE [5/5], WE [5/5],  [wnl]  LEFT    LE:  HF [5/5], KE [5/5], DF [5/5], EHL [5/5],  PF [5/5]  RIGHT LE:  HF [3-/5], KE [4+/5], DF [5/5], EHL [5/5],  PF [5/5]

## 2021-03-07 NOTE — PROGRESS NOTE ADULT - ASSESSMENT
SOY TOVAR is a 80y with a history of HTN, CAD who presented to [facility] on 2/25 with complaint of right hip pain  and found to have right hip IT fracture. Hospital course complicated by postoperative blood loss anemia. Now admitted to [facility] after for initiation of a multidisciplinary rehab program consisting focused on functional mobility, transfers and ADLs (activities of daily living).      Comprehensive Multidisciplinary Rehab Program:  - Continue comprehensive rehab program, 3 hours a day, 5 days a week.  - PT 2hr/day: Focused on improving strength, endurance, coordination, balance, functional mobility, and transfers  - OT 1hr/day: Focused on improving strength, fine motor skills, coordination, posture and ADLs.    - Activity Limitations: Decreased social, vocational and leisure activities, decreased self care and ADLs, decreased mobility, decreased ability to manage household and finances.     Participation Restrictions/Precautions:  - Weight bearing status: WBAT  - Precautions:    [x ] Falls   [ ] Spinal   [ ] Hip (Anterior or Posterior)       [ ] Seizure  [ ] Cardiac   [ ] Sternal    Rehab Diagnosis/Management:  Sleep:   - Maintain quiet hours and low stim environment.      Pain Management:  - Tylenol q 6 hrs, tramadol prn, ice packs, lidocaine patch     GI/Bowel:  - At risk for constipation due to neurologic diagnosis, immobility and/or medication use  - Senna QHS  - GI ppx: protonix     /Bladder:   - At risk for incontinence and retention due to neurologic diagnosis and limited mobility  - Currently patient voids:    [x ] independent     [ ] external collection device (condom cath)    [ ] Indwelling villegas catheter     [ ] Intermittent catheterization  - baseline PVR - 3cc  - Encourage timed voids every 4 hours while awake for independence and to promote continence during therapy.    Skin/Pressure Injury:   - At risk for pressure injury due to neurologic diagnosis and relative immobility.  - Skin assessment on admission admission: 2 small blisters inferior to incision   - Monitor Incisions: 2 hip incisions intact with glue  - Turn every 2 hours while in bed, air mattress  - Soft heel protectors  - Skin barrier cream as needed  - Nursing to monitor skin Qshift    Diet/Dysphagia:  - Diet Consistency/Modifications: Regular diet   - Supplements: Ensure TID       DVT ppx: lovenox   - SCDs  - Last Doppler on N/A    -------------  Comorbid Medical Management:    HTN  -Continue norvasc, losartan and atenolol  -Monitor SBPs     Hypernatremia: Vd=267 3/7  -encourage fluids    Thrombocytopenia: plts=61k 3/7  - repeat CBC in AM  - consider HIT if continue to trend down  - Hospitalist follow up    CAD  -Continue ASA and statin     Labs:  CBC, CMP 3/8          ---------------      Outpatient Follow-up (Specialty/Name of physician): Dr Lora (ortho)    --------------    ---------------

## 2021-03-07 NOTE — PROGRESS NOTE ADULT - SUBJECTIVE AND OBJECTIVE BOX
Hospitalist: Dario Vasquez DO    CHIEF COMPLAINT: Patient is a 80y old  female who presents with a chief complaint of 08.11 Status post unilateral hip fracture (07 Mar 2021 12:46)    SUBJECTIVE / OVERNIGHT EVENTS: Patient seen and examined. No acute events overnight. Pain well controlled and patient without any complaints.    MEDICATIONS  (STANDING):  acetaminophen   Tablet .. 650 milliGRAM(s) Oral every 6 hours  amLODIPine   Tablet 5 milliGRAM(s) Oral daily  ascorbic acid 500 milliGRAM(s) Oral two times a day  aspirin enteric coated 81 milliGRAM(s) Oral daily  ATENolol  Tablet 100 milliGRAM(s) Oral daily  atorvastatin 10 milliGRAM(s) Oral at bedtime  enoxaparin Injectable 40 milliGRAM(s) SubCutaneous daily  lidocaine   Patch 1 Patch Transdermal <User Schedule>  losartan 100 milliGRAM(s) Oral daily  multivitamin 1 Tablet(s) Oral daily  nicotine -   7 mG/24Hr(s) Patch 1 patch Transdermal daily  pantoprazole    Tablet 40 milliGRAM(s) Oral before breakfast  senna 2 Tablet(s) Oral at bedtime    MEDICATIONS  (PRN):  polyethylene glycol 3350 17 Gram(s) Oral daily PRN Constipation  traMADol 50 milliGRAM(s) Oral every 4 hours PRN Severe Pain (7 - 10)  traMADol 25 milliGRAM(s) Oral every 4 hours PRN Moderate Pain (4 - 6)      VITALS:  T(F): 98.1 (03-07-21 @ 08:17), Max: 98.9 (03-06-21 @ 19:45)  HR: 58 (03-07-21 @ 08:17) (58 - 68)  BP: 109/56 (03-07-21 @ 08:17) (109/56 - 146/71)  RR: 16 (03-07-21 @ 08:17) (16 - 16)  SpO2: 99% (03-07-21 @ 08:17)      REVIEW OF SYSTEMS:  For ROV please refer back to H&P     PHYSICAL EXAM:  General: NAD, Resting Comfortable,                                  HEENT: NC/AT, Normal Conjunctivae  Cardio: RRR, Normal S1-S2                              Pulm: No Respiratory Distress,  Lungs CTAB                        Abdomen: ND/NT, Soft, BS+                                                MSK: No joint swelling, Full ROM                                         Ext: No C/C/E, No calf tenderness    LABS:              9.8                  144  | 28   | 32           6.66  >-----------< 106     ------------------------< 85                    31.0                 4.2  | 109  | 1.11                                         Ca 9.0   Mg x     Ph x           TPro  6.2  /  Alb  2.8      TBili  0.5  /  DBili  x         AST  23  /  ALT  30            AlkPhos  78      RADIOLOGY & ADDITIONAL TESTS:    Imaging Personally Reviewed:    [X] Consultant(s) Notes Reviewed:  [X] Care Discussed with Consultants/Other Providers:

## 2021-03-08 PROCEDURE — 99232 SBSQ HOSP IP/OBS MODERATE 35: CPT | Mod: GC

## 2021-03-08 RX ORDER — TRAMADOL HYDROCHLORIDE 50 MG/1
50 TABLET ORAL EVERY 4 HOURS
Refills: 0 | Status: DISCONTINUED | OUTPATIENT
Start: 2021-03-08 | End: 2021-03-12

## 2021-03-08 RX ORDER — TRAMADOL HYDROCHLORIDE 50 MG/1
25 TABLET ORAL EVERY 4 HOURS
Refills: 0 | Status: DISCONTINUED | OUTPATIENT
Start: 2021-03-08 | End: 2021-03-12

## 2021-03-08 RX ADMIN — Medication 1 PATCH: at 07:59

## 2021-03-08 RX ADMIN — Medication 650 MILLIGRAM(S): at 00:00

## 2021-03-08 RX ADMIN — Medication 1 PATCH: at 11:04

## 2021-03-08 RX ADMIN — LIDOCAINE 1 PATCH: 4 CREAM TOPICAL at 20:00

## 2021-03-08 RX ADMIN — LIDOCAINE 1 PATCH: 4 CREAM TOPICAL at 07:52

## 2021-03-08 RX ADMIN — Medication 1 PATCH: at 11:10

## 2021-03-08 RX ADMIN — Medication 650 MILLIGRAM(S): at 12:09

## 2021-03-08 RX ADMIN — ATORVASTATIN CALCIUM 10 MILLIGRAM(S): 80 TABLET, FILM COATED ORAL at 21:53

## 2021-03-08 RX ADMIN — Medication 650 MILLIGRAM(S): at 17:40

## 2021-03-08 RX ADMIN — AMLODIPINE BESYLATE 5 MILLIGRAM(S): 2.5 TABLET ORAL at 05:23

## 2021-03-08 RX ADMIN — Medication 1 TABLET(S): at 11:10

## 2021-03-08 RX ADMIN — Medication 500 MILLIGRAM(S): at 17:40

## 2021-03-08 RX ADMIN — Medication 81 MILLIGRAM(S): at 11:10

## 2021-03-08 RX ADMIN — ENOXAPARIN SODIUM 40 MILLIGRAM(S): 100 INJECTION SUBCUTANEOUS at 11:10

## 2021-03-08 RX ADMIN — Medication 650 MILLIGRAM(S): at 18:10

## 2021-03-08 RX ADMIN — Medication 650 MILLIGRAM(S): at 06:00

## 2021-03-08 RX ADMIN — Medication 1 PATCH: at 19:38

## 2021-03-08 RX ADMIN — Medication 650 MILLIGRAM(S): at 11:10

## 2021-03-08 RX ADMIN — LIDOCAINE 1 PATCH: 4 CREAM TOPICAL at 19:38

## 2021-03-08 RX ADMIN — LOSARTAN POTASSIUM 100 MILLIGRAM(S): 100 TABLET, FILM COATED ORAL at 05:23

## 2021-03-08 RX ADMIN — PANTOPRAZOLE SODIUM 40 MILLIGRAM(S): 20 TABLET, DELAYED RELEASE ORAL at 05:23

## 2021-03-08 RX ADMIN — Medication 650 MILLIGRAM(S): at 05:23

## 2021-03-08 NOTE — PROGRESS NOTE ADULT - SUBJECTIVE AND OBJECTIVE BOX
This is a 80 female with traumatic fall down 2 stairs at home. Landed on her right side and denies head trauma and LOC. Patient reports she was walking down her stairs and her shoes slipped causing her to spin over and fall. Denies hitting her head. Was unable to ambulate after the fall and thus son brought her to [facility]'s ER on 2/25. Pain reported at the right hip when she moved her leg, otherwise no pain anywhere else.   Patient found to have right IT fracture to right hip with comminution of the lesser trochanteric area.   Patient cleared for the OR and underwent right hip IMN on 2/27 by Dr Lora.  Post operatively, acute blood loss anemia noted.   Patient seen by OT, PT and PM&R. Recommendations made for acute IPR.   Patient deemed medically stable for dc to [facility]'s acute IPR on 3/4/2021.       Incidentally: Patient noted to have a 1.1 x 1.0 cm nodule in the right lower lobe medially adjacent to the IVC found on CT of abdomen and chest prior to surgery. Report reads: "While this may be benign, malignancy cannot be excluded. Further evaluation is necessary. If no old films are available for comparison, noncontrast chest CT is recommended to assess for additional nodules."       ROS:   doing well   last BM 3/8   voiding spontaneously  pain controlled with tylenol  no nausea no vomiting  no chest pain  no  headaches, no dizziness        MEDICATIONS  (STANDING):  acetaminophen   Tablet .. 650 milliGRAM(s) Oral every 6 hours  amLODIPine   Tablet 5 milliGRAM(s) Oral daily  ascorbic acid 500 milliGRAM(s) Oral two times a day  aspirin enteric coated 81 milliGRAM(s) Oral daily  ATENolol  Tablet 100 milliGRAM(s) Oral daily  atorvastatin 10 milliGRAM(s) Oral at bedtime  enoxaparin Injectable 40 milliGRAM(s) SubCutaneous daily  lidocaine   Patch 1 Patch Transdermal <User Schedule>  losartan 100 milliGRAM(s) Oral daily  multivitamin 1 Tablet(s) Oral daily  nicotine -   7 mG/24Hr(s) Patch 1 patch Transdermal daily  pantoprazole    Tablet 40 milliGRAM(s) Oral before breakfast  senna 2 Tablet(s) Oral at bedtime    MEDICATIONS  (PRN):  polyethylene glycol 3350 17 Gram(s) Oral daily PRN Constipation  traMADol 50 milliGRAM(s) Oral every 4 hours PRN Severe Pain (7 - 10)  traMADol 25 milliGRAM(s) Oral every 4 hours PRN Moderate Pain (4 - 6)                            9.8    6.66  )-----------( 106      ( 07 Mar 2021 05:45 )             31.0     03-07    144  |  109<H>  |  32<H>  ----------------------------<  85  4.2   |  28  |  1.11    Ca    9.0      07 Mar 2021 05:45    TPro  6.2  /  Alb  2.8<L>  /  TBili  0.5  /  DBili  x   /  AST  23  /  ALT  30  /  AlkPhos  78  03-07        CAPILLARY BLOOD GLUCOSE          Vital Signs Last 24 Hrs  T(C): 37.1 (08 Mar 2021 08:16), Max: 37.1 (07 Mar 2021 20:30)  T(F): 98.8 (08 Mar 2021 08:16), Max: 98.8 (07 Mar 2021 20:30)  HR: 67 (08 Mar 2021 08:16) (67 - 67)  BP: 145/69 (08 Mar 2021 08:16) (121/65 - 145/69)  BP(mean): --  RR: 16 (08 Mar 2021 08:16) (16 - 16)  SpO2: 98% (08 Mar 2021 08:16) (98% - 98%)      Physical Exam:   General: NAD, Resting Comfortable,                                  HEENT: NC/AT, EOM I, PERRLA, Normal Conjunctivae  Cardio: RRR, Normal S1-S2, No M/G/R                              Pulm: No Respiratory Distress,  Lungs CTAB                        Abdomen: ND/NT, Soft, BS+                                                MSK: No joint swelling, Full ROM                                         Ext: No C/C/E, No calf tenderness    Skin:  right hip incision with glue 2 incisions 2 intact blisters inferior to distal incision                                                             Wounds: none  Decubitus Ulcers: None Present     Neurological Examination    Cognitive: AAO x 3                                                                         Attention: Intact   Judgment: Good evidence of judgement                               Memory: Recall 3 objects immediate and 3 min later      Mood/Affect: wnl                                                                           Communication:  Fluent,  No dysarthria   Swallow: intact  CN II - XII  intact  Coordination: FTN/HTS intact                                                                              Sensory: Intact to light touch, PP and Vibration                                                                                             Tone: normal Throughout   Balance: impaired    Motor    LEFT    UE: SF [5/5], EF [5/5], EE [5/5], WE [5/5],  [wnl]  RIGHT UE: SF [5/5], EF [5/5], EE [5/5], WE [5/5],  [wnl]  LEFT    LE:  HF [5/5], KE [5/5], DF [5/5], EHL [5/5],  PF [5/5]  RIGHT LE:  HF [2/5], KE [4/5], DF [5/5], EHL [5/5],  PF [5/5]      Reflex:  2 + thoroughout, Guardado/Babinski negative      Rehab Eval in progress

## 2021-03-08 NOTE — PROGRESS NOTE ADULT - ASSESSMENT
SOY TOVAR is a 80y with a history of HTN, CAD who presented to [facility] on 2/25 with complaint of right hip pain  and found to have right hip IT fracture. Hospital course complicated by postoperative blood loss anemia. Now admitted to [facility] after for initiation of a multidisciplinary rehab program consisting focused on functional mobility, transfers and ADLs (activities of daily living).      Comprehensive Multidisciplinary Rehab Program:  - Start comprehensive rehab program, 3 hours a day, 5 days a week.  - PT 2hr/day: Focused on improving strength, endurance, coordination, balance, functional mobility, and transfers  - OT 1hr/day: Focused on improving strength, fine motor skills, coordination, posture and ADLs.    - Activity Limitations: Decreased social, vocational and leisure activities, decreased self care and ADLs, decreased mobility, decreased ability to manage household and finances.     Participation Restrictions/Precautions:  - Weight bearing status: WBAT  - Precautions:    [x ] Falls   [ ] Spinal   [ ] Hip (Anterior or Posterior)       [ ] Seizure  [ ] Cardiac   [ ] Sternal    Rehab Diagnosis/Management:  Sleep:   - Maintain quiet hours and low stim environment.      Pain Management:  - Tylenol q 6 hrs, tramadol prn, ice packs, lidocaine patch     GI/Bowel:  - At risk for constipation due to neurologic diagnosis, immobility and/or medication use  - Senna QHS  - GI ppx: protonix     /Bladder:   - At risk for incontinence and retention due to neurologic diagnosis and limited mobility  - Currently patient voids:    [x ] independent     [ ] external collection device (condom cath)    [ ] Indwelling villegas catheter     [ ] Intermittent catheterization  - baseline PVR - 3cc  - Encourage timed voids every 4 hours while awake for independence and to promote continence during therapy.    Skin/Pressure Injury:   - At risk for pressure injury due to neurologic diagnosis and relative immobility.  - Skin assessment on admission admission: 2 small blisters inferior to incision   - Monitor Incisions: 2 hip incisions intact with glue  - Turn every 2 hours while in bed, air mattress  - Soft heel protectors  - Skin barrier cream as needed  - Nursing to monitor skin Qshift    Diet/Dysphagia:  - Diet Consistency/Modifications: Regular diet   - Supplements: Ensure TID       DVT ppx: lovenox   - SCDs  - Last Doppler on N/A    -------------  Comorbid Medical Management:    HTN  -Continue norvasc, losartan and atenolol  -Monitor SBPs     CAD  -Continue ASA and statin     OMARI  BUN 32 Cr1.1  encourage fluids  CMP Monday    thrombocytosis  plts 100k  Follow CBC    ---------------      Outpatient Follow-up (Specialty/Name of physician): Dr Lora (ortho)    --------------  Goals: Safe discharge to home  Estimated Length of Stay: 10-14 days  Rehab Potential: Good  Medical Prognosis: Good  Estimated Disposition: Home with Home Care  ---------------

## 2021-03-09 LAB
ALBUMIN SERPL ELPH-MCNC: 3 G/DL — LOW (ref 3.3–5)
ALP SERPL-CCNC: 88 U/L — SIGNIFICANT CHANGE UP (ref 40–120)
ALT FLD-CCNC: 31 U/L — SIGNIFICANT CHANGE UP (ref 10–45)
ANION GAP SERPL CALC-SCNC: 8 MMOL/L — SIGNIFICANT CHANGE UP (ref 5–17)
AST SERPL-CCNC: 25 U/L — SIGNIFICANT CHANGE UP (ref 10–40)
BILIRUB SERPL-MCNC: 0.5 MG/DL — SIGNIFICANT CHANGE UP (ref 0.2–1.2)
BUN SERPL-MCNC: 32 MG/DL — HIGH (ref 7–23)
CALCIUM SERPL-MCNC: 9.5 MG/DL — SIGNIFICANT CHANGE UP (ref 8.4–10.5)
CHLORIDE SERPL-SCNC: 111 MMOL/L — HIGH (ref 96–108)
CO2 SERPL-SCNC: 27 MMOL/L — SIGNIFICANT CHANGE UP (ref 22–31)
CREAT SERPL-MCNC: 0.94 MG/DL — SIGNIFICANT CHANGE UP (ref 0.5–1.3)
GLUCOSE SERPL-MCNC: 94 MG/DL — SIGNIFICANT CHANGE UP (ref 70–99)
HCT VFR BLD CALC: 29.7 % — LOW (ref 34.5–45)
HEPARIN-PF4 AB RESULT: 4.4 U/ML — HIGH (ref 0–0.9)
HGB BLD-MCNC: 9.4 G/DL — LOW (ref 11.5–15.5)
MCHC RBC-ENTMCNC: 29.6 PG — SIGNIFICANT CHANGE UP (ref 27–34)
MCHC RBC-ENTMCNC: 31.6 GM/DL — LOW (ref 32–36)
MCV RBC AUTO: 93.4 FL — SIGNIFICANT CHANGE UP (ref 80–100)
NRBC # BLD: 0 /100 WBCS — SIGNIFICANT CHANGE UP (ref 0–0)
PF4 HEPARIN CMPLX AB SER-ACNC: POSITIVE — SIGNIFICANT CHANGE UP
PLATELET # BLD AUTO: 106 K/UL — LOW (ref 150–400)
POTASSIUM SERPL-MCNC: 4.4 MMOL/L — SIGNIFICANT CHANGE UP (ref 3.5–5.3)
POTASSIUM SERPL-SCNC: 4.4 MMOL/L — SIGNIFICANT CHANGE UP (ref 3.5–5.3)
PROT SERPL-MCNC: 6.3 G/DL — SIGNIFICANT CHANGE UP (ref 6–8.3)
RBC # BLD: 3.18 M/UL — LOW (ref 3.8–5.2)
RBC # FLD: 13.7 % — SIGNIFICANT CHANGE UP (ref 10.3–14.5)
SODIUM SERPL-SCNC: 146 MMOL/L — HIGH (ref 135–145)
WBC # BLD: 6.4 K/UL — SIGNIFICANT CHANGE UP (ref 3.8–10.5)
WBC # FLD AUTO: 6.4 K/UL — SIGNIFICANT CHANGE UP (ref 3.8–10.5)

## 2021-03-09 PROCEDURE — 99232 SBSQ HOSP IP/OBS MODERATE 35: CPT | Mod: GC

## 2021-03-09 RX ADMIN — ATORVASTATIN CALCIUM 10 MILLIGRAM(S): 80 TABLET, FILM COATED ORAL at 21:21

## 2021-03-09 RX ADMIN — LIDOCAINE 1 PATCH: 4 CREAM TOPICAL at 17:09

## 2021-03-09 RX ADMIN — Medication 650 MILLIGRAM(S): at 17:50

## 2021-03-09 RX ADMIN — Medication 1 PATCH: at 11:41

## 2021-03-09 RX ADMIN — Medication 650 MILLIGRAM(S): at 12:58

## 2021-03-09 RX ADMIN — Medication 650 MILLIGRAM(S): at 00:35

## 2021-03-09 RX ADMIN — Medication 500 MILLIGRAM(S): at 17:50

## 2021-03-09 RX ADMIN — Medication 650 MILLIGRAM(S): at 05:40

## 2021-03-09 RX ADMIN — ENOXAPARIN SODIUM 40 MILLIGRAM(S): 100 INJECTION SUBCUTANEOUS at 11:48

## 2021-03-09 RX ADMIN — Medication 81 MILLIGRAM(S): at 11:48

## 2021-03-09 RX ADMIN — Medication 1 TABLET(S): at 11:48

## 2021-03-09 RX ADMIN — PANTOPRAZOLE SODIUM 40 MILLIGRAM(S): 20 TABLET, DELAYED RELEASE ORAL at 05:40

## 2021-03-09 RX ADMIN — Medication 1 PATCH: at 19:50

## 2021-03-09 RX ADMIN — Medication 650 MILLIGRAM(S): at 23:13

## 2021-03-09 RX ADMIN — ATENOLOL 100 MILLIGRAM(S): 25 TABLET ORAL at 05:40

## 2021-03-09 RX ADMIN — AMLODIPINE BESYLATE 5 MILLIGRAM(S): 2.5 TABLET ORAL at 05:40

## 2021-03-09 RX ADMIN — LOSARTAN POTASSIUM 100 MILLIGRAM(S): 100 TABLET, FILM COATED ORAL at 05:40

## 2021-03-09 RX ADMIN — Medication 650 MILLIGRAM(S): at 00:01

## 2021-03-09 RX ADMIN — Medication 1 PATCH: at 11:48

## 2021-03-09 RX ADMIN — Medication 1 PATCH: at 16:34

## 2021-03-09 RX ADMIN — Medication 650 MILLIGRAM(S): at 19:19

## 2021-03-09 RX ADMIN — LIDOCAINE 1 PATCH: 4 CREAM TOPICAL at 05:41

## 2021-03-09 RX ADMIN — TRAMADOL HYDROCHLORIDE 50 MILLIGRAM(S): 50 TABLET ORAL at 14:01

## 2021-03-09 RX ADMIN — Medication 500 MILLIGRAM(S): at 05:40

## 2021-03-09 RX ADMIN — TRAMADOL HYDROCHLORIDE 50 MILLIGRAM(S): 50 TABLET ORAL at 15:34

## 2021-03-09 RX ADMIN — Medication 650 MILLIGRAM(S): at 11:47

## 2021-03-09 RX ADMIN — LIDOCAINE 1 PATCH: 4 CREAM TOPICAL at 07:33

## 2021-03-09 NOTE — PROGRESS NOTE ADULT - ATTENDING COMMENTS
Rehab Attending- Patient seen and examined with NP Sunny- Case discussed, above note reviewed by me with modifications made    Dizzy this AM in OT  Spontaneously resolved  BP OK  BUN elevated- encouraged to drink more fluids  frequent BMs with senna yesterday  Plts 100K - lovenox DCd - HIT Ab sent  to continue intensive Rehab Program

## 2021-03-09 NOTE — PROGRESS NOTE ADULT - ASSESSMENT
SOY TOVAR is a 80y with a history of HTN, CAD who presented to [facility] on 2/25 with complaint of right hip pain  and found to have right hip IT fracture. Hospital course complicated by postoperative blood loss anemia. Now admitted to [facility] after for initiation of a multidisciplinary rehab program consisting focused on functional mobility, transfers and ADLs (activities of daily living).      Comprehensive Multidisciplinary Rehab Program:  - Continue comprehensive rehab program, 3 hours a day, 5 days a week.  - PT 2hr/day: Focused on improving strength, endurance, coordination, balance, functional mobility, and transfers  - OT 1hr/day: Focused on improving strength, fine motor skills, coordination, posture and ADLs.    - Activity Limitations: Decreased social, vocational and leisure activities, decreased self care and ADLs, decreased mobility, decreased ability to manage household and finances.     Participation Restrictions/Precautions:  - Weight bearing status: WBAT  - Precautions:    [x ] Falls   [ ] Spinal   [ ] Hip (Anterior or Posterior)       [ ] Seizure  [ ] Cardiac   [ ] Sternal    Rehab Diagnosis/Management:  Sleep:   - Maintain quiet hours and low stim environment.      Pain Management:  - Tylenol q 6 hrs, tramadol prn, ice packs, lidocaine patch     GI/Bowel:  - At risk for constipation due to neurologic diagnosis, immobility and/or medication use  - Senna QHS  - GI ppx: protonix     /Bladder:   - At risk for incontinence and retention due to neurologic diagnosis and limited mobility  - Currently patient voids:    [x ] independent     [ ] external collection device (condom cath)    [ ] Indwelling villegas catheter     [ ] Intermittent catheterization  - baseline PVR - 3cc  - Encourage timed voids every 4 hours while awake for independence and to promote continence during therapy.    Skin/Pressure Injury:   - At risk for pressure injury due to neurologic diagnosis and relative immobility.  - Skin assessment on admission admission: 2 small blisters inferior to incision   - Monitor Incisions: 2 hip incisions intact with glue  - Turn every 2 hours while in bed, air mattress  - Soft heel protectors  - Skin barrier cream as needed  - Nursing to monitor skin Qshift    Diet/Dysphagia:  - Diet Consistency/Modifications: Regular diet   - Supplements: Ensure TID       DVT ppx: lovenox   - SCDs  - Last Doppler on N/A    -------------  Comorbid Medical Management:    HTN  -Continue norvasc, losartan and atenolol  -Monitor SBPs     CAD  -Continue ASA and statin     OMARI  BUN 32 Cr1.1-->32/0.94  encourage fluids  CMP Monday    thrombocytosis  plts 100k 3/7 and 3/9  Follow CBC-repeat in AM    ---------------      Outpatient Follow-up (Specialty/Name of physician): Dr Lora (ortho)    --------------  Goals: Safe discharge to home  Estimated Length of Stay: 10-14 days  Rehab Potential: Good  Medical Prognosis: Good  Estimated Disposition: Home with Home Care  ---------------   SOY TOVAR is a 80y with a history of HTN, CAD who presented to [facility] on 2/25 with complaint of right hip pain  and found to have right hip IT fracture. Hospital course complicated by postoperative blood loss anemia. Now admitted to [facility] after for initiation of a multidisciplinary rehab program consisting focused on functional mobility, transfers and ADLs (activities of daily living).      Comprehensive Multidisciplinary Rehab Program:  - Continue comprehensive rehab program, 3 hours a day, 5 days a week.  - PT 2hr/day: Focused on improving strength, endurance, coordination, balance, functional mobility, and transfers  - OT 1hr/day: Focused on improving strength, fine motor skills, coordination, posture and ADLs.    - Activity Limitations: Decreased social, vocational and leisure activities, decreased self care and ADLs, decreased mobility, decreased ability to manage household and finances.     Participation Restrictions/Precautions:  - Weight bearing status: WBAT  - Precautions:    [x ] Falls   [ ] Spinal   [ ] Hip (Anterior or Posterior)       [ ] Seizure  [ ] Cardiac   [ ] Sternal    Rehab Diagnosis/Management:  Sleep:   - Maintain quiet hours and low stim environment.      Pain Management:  - Tylenol q 6 hrs, tramadol prn, ice packs, lidocaine patch     GI/Bowel:  - At risk for constipation due to neurologic diagnosis, immobility and/or medication use  - Senna QHS  - GI ppx: protonix     /Bladder:   - At risk for incontinence and retention due to neurologic diagnosis and limited mobility  - Currently patient voids:    [x ] independent     [ ] external collection device (condom cath)    [ ] Indwelling villegas catheter     [ ] Intermittent catheterization  - baseline PVR - 3cc  - Encourage timed voids every 4 hours while awake for independence and to promote continence during therapy.    Skin/Pressure Injury:   - At risk for pressure injury due to neurologic diagnosis and relative immobility.  - Skin assessment on admission admission: 2 small blisters inferior to incision   - Monitor Incisions: 2 hip incisions intact with glue  - Turn every 2 hours while in bed, air mattress  - Soft heel protectors  - Skin barrier cream as needed  - Nursing to monitor skin Qshift    Diet/Dysphagia:  - Diet Consistency/Modifications: Regular diet   - Supplements: Ensure TID       DVT ppx: lovenox   - SCDs  - Last Doppler on N/A    -------------  Comorbid Medical Management:    HTN  -Continue norvasc, losartan and atenolol  -Monitor SBPs     CAD  -Continue ASA and statin     OMARI  BUN 32 Cr1.1-->32/0.94  encourage fluids  CMP Monday    thrombocytosis  plts 100k 3/7 and 3/9  Follow CBC-repeat in AM  D/c     ---------------      Outpatient Follow-up (Specialty/Name of physician): Dr Lora (ortho)    --------------  Goals: Safe discharge to home  Estimated Length of Stay: 10-14 days  Rehab Potential: Good  Medical Prognosis: Good  Estimated Disposition: Home with Home Care  ---------------   SOY TOVAR is a 80y with a history of HTN, CAD who presented to [facility] on 2/25 with complaint of right hip pain  and found to have right hip IT fracture. Hospital course complicated by postoperative blood loss anemia. Now admitted to [facility] after for initiation of a multidisciplinary rehab program consisting focused on functional mobility, transfers and ADLs (activities of daily living).      Comprehensive Multidisciplinary Rehab Program:  - Continue comprehensive rehab program, 3 hours a day, 5 days a week.  - PT 2hr/day: Focused on improving strength, endurance, coordination, balance, functional mobility, and transfers  - OT 1hr/day: Focused on improving strength, fine motor skills, coordination, posture and ADLs.    - Activity Limitations: Decreased social, vocational and leisure activities, decreased self care and ADLs, decreased mobility, decreased ability to manage household and finances.     Participation Restrictions/Precautions:  - Weight bearing status: WBAT  - Precautions:    [x ] Falls   [ ] Spinal   [ ] Hip (Anterior or Posterior)       [ ] Seizure  [ ] Cardiac   [ ] Sternal    Rehab Diagnosis/Management:  Sleep:   - Maintain quiet hours and low stim environment.      Pain Management:  - Tylenol q 6 hrs, tramadol prn, ice packs, lidocaine patch     GI/Bowel:  - At risk for constipation due to neurologic diagnosis, immobility and/or medication use  - Senna QHS  - GI ppx: protonix     /Bladder:   - At risk for incontinence and retention due to neurologic diagnosis and limited mobility  - Currently patient voids:    [x ] independent     [ ] external collection device (condom cath)    [ ] Indwelling villegas catheter     [ ] Intermittent catheterization  - baseline PVR - 3cc  - Encourage timed voids every 4 hours while awake for independence and to promote continence during therapy.    Skin/Pressure Injury:   - At risk for pressure injury due to neurologic diagnosis and relative immobility.  - Skin assessment on admission admission: 2 small blisters inferior to incision   - Monitor Incisions: 2 hip incisions intact with glue  - Turn every 2 hours while in bed, air mattress  - Soft heel protectors  - Skin barrier cream as needed  - Nursing to monitor skin Qshift    Diet/Dysphagia:  - Diet Consistency/Modifications: Regular diet   - Supplements: Ensure TID       DVT ppx: lovenox   - SCDs  - Last Doppler on N/A    -------------  Comorbid Medical Management:    HTN  -Continue norvasc, losartan and atenolol  -Monitor SBPs     CAD  -Continue ASA and statin     OMARI  BUN 32 Cr1.1-->32/0.94  encourage fluids  CMP Thursday    thrombocytosis  plts 100k 3/7 and 3/9  Follow CBC-repeat in AM  D/c lovenox  check HIT antibody    ---------------      Outpatient Follow-up (Specialty/Name of physician): Dr Lora (ortho)    --------------  Goals: Safe discharge to home  Estimated Length of Stay: 10-14 days  Rehab Potential: Good  Medical Prognosis: Good  Estimated Disposition: Home with Home Care  ---------------

## 2021-03-09 NOTE — PROGRESS NOTE ADULT - SUBJECTIVE AND OBJECTIVE BOX
This is a 80 female with traumatic fall down 2 stairs at home. Landed on her right side and denies head trauma and LOC. Patient reports she was walking down her stairs and her shoes slipped causing her to spin over and fall. Denies hitting her head. Was unable to ambulate after the fall and thus son brought her to [facility]'s ER on 2/25. Pain reported at the right hip when she moved her leg, otherwise no pain anywhere else.   Patient found to have right IT fracture to right hip with comminution of the lesser trochanteric area.   Patient cleared for the OR and underwent right hip IMN on 2/27 by Dr Lora.  Post operatively, acute blood loss anemia noted.   Patient seen by OT, PT and PM&R. Recommendations made for acute IPR.   Patient deemed medically stable for dc to [facility]'s acute IPR on 3/4/2021.       Incidentally: Patient noted to have a 1.1 x 1.0 cm nodule in the right lower lobe medially adjacent to the IVC found on CT of abdomen and chest prior to surgery. Report reads: "While this may be benign, malignancy cannot be excluded. Further evaluation is necessary. If no old films are available for comparison, noncontrast chest CT is recommended to assess for additional nodules."       ROS:   States that she was dizzy this morning in therapy. SBP 130s. Patient reports 6 loose BMs yesterday and not taking in as much PO fluids due to not wanting to have to get to the restroom to void.  doing well   last BM 3/8   voiding spontaneously  pain controlled with tylenol  no nausea no vomiting  no chest pain  no  headaches        MEDICATIONS  (STANDING):  acetaminophen   Tablet .. 650 milliGRAM(s) Oral every 6 hours  amLODIPine   Tablet 5 milliGRAM(s) Oral daily  ascorbic acid 500 milliGRAM(s) Oral two times a day  aspirin enteric coated 81 milliGRAM(s) Oral daily  ATENolol  Tablet 100 milliGRAM(s) Oral daily  atorvastatin 10 milliGRAM(s) Oral at bedtime  enoxaparin Injectable 40 milliGRAM(s) SubCutaneous daily  lidocaine   Patch 1 Patch Transdermal <User Schedule>  losartan 100 milliGRAM(s) Oral daily  multivitamin 1 Tablet(s) Oral daily  nicotine -   7 mG/24Hr(s) Patch 1 patch Transdermal daily  pantoprazole    Tablet 40 milliGRAM(s) Oral before breakfast    MEDICATIONS  (PRN):  polyethylene glycol 3350 17 Gram(s) Oral daily PRN Constipation  traMADol 50 milliGRAM(s) Oral every 4 hours PRN Severe Pain (7 - 10)  traMADol 25 milliGRAM(s) Oral every 4 hours PRN Moderate Pain (4 - 6)               03-09    146<H>  |  111<H>  |  32<H>  ----------------------------<  94  4.4   |  27  |  0.94    Ca    9.5      09 Mar 2021 05:41    TPro  6.3  /  Alb  3.0<L>  /  TBili  0.5  /  DBili  x   /  AST  25  /  ALT  31  /  AlkPhos  88  03-09                          9.4    6.40  )-----------( 106      ( 09 Mar 2021 05:41 )             29.7     Vital Signs Last 24 Hrs  T(C): 36.7 (09 Mar 2021 07:42), Max: 36.7 (08 Mar 2021 20:11)  T(F): 98.1 (09 Mar 2021 07:42), Max: 98.1 (09 Mar 2021 07:42)  HR: 61 (09 Mar 2021 07:42) (61 - 83)  BP: 112/64 (09 Mar 2021 07:42) (112/64 - 129/68)  RR: 16 (09 Mar 2021 07:42) (16 - 16)  SpO2: 95% (09 Mar 2021 07:42) (95% - 97%)      Physical Exam:   General: NAD, Resting Comfortable,                                  HEENT: NC/AT, EOM I, PERRLA, Normal Conjunctivae  Cardio: RRR, Normal S1-S2, No M/G/R                              Pulm: No Respiratory Distress,  Lungs CTAB                        Abdomen: ND/NT, Soft, BS+                                                MSK: No joint swelling, Full ROM                                         Ext: No C/C/E, No calf tenderness    Skin:  right hip incision with glue 2 incisions 2 intact blisters inferior to distal incision                                                             Wounds: none  Decubitus Ulcers: None Present     Neurological Examination    Cognitive: AAO x 3                                                                         Attention: Intact   Judgment: Good evidence of judgement                               Memory: Recall 3 objects immediate and 3 min later      Mood/Affect: wnl                                                                           Communication:  Fluent,  No dysarthria   Swallow: intact  CN II - XII  intact  Coordination: FTN/HTS intact                                                                              Sensory: Intact to light touch, PP and Vibration                                                                                             Tone: normal Throughout   Balance: impaired    Motor    LEFT    UE: SF [5/5], EF [5/5], EE [5/5], WE [5/5],  [wnl]  RIGHT UE: SF [5/5], EF [5/5], EE [5/5], WE [5/5],  [wnl]  LEFT    LE:  HF [5/5], KE [5/5], DF [5/5], EHL [5/5],  PF [5/5]  RIGHT LE:  HF [2/5], KE [4/5], DF [5/5], EHL [5/5],  PF [5/5]      Reflex:  2 + throughout Guardado/Babinski negative      IDT meeting 3/8      OT: min A LBD   cs toileting, commode transfer   min A tub transfer       PT: CS to CG   min A to move leg in and   CS 80ft   steps with 2 rails min A     Tentative dc home on 3/17 with home care services.

## 2021-03-10 LAB
ANION GAP SERPL CALC-SCNC: 7 MMOL/L — SIGNIFICANT CHANGE UP (ref 5–17)
BUN SERPL-MCNC: 39 MG/DL — HIGH (ref 7–23)
CALCIUM SERPL-MCNC: 9.7 MG/DL — SIGNIFICANT CHANGE UP (ref 8.4–10.5)
CHLORIDE SERPL-SCNC: 111 MMOL/L — HIGH (ref 96–108)
CO2 SERPL-SCNC: 28 MMOL/L — SIGNIFICANT CHANGE UP (ref 22–31)
CREAT SERPL-MCNC: 1.02 MG/DL — SIGNIFICANT CHANGE UP (ref 0.5–1.3)
GLUCOSE SERPL-MCNC: 91 MG/DL — SIGNIFICANT CHANGE UP (ref 70–99)
HCT VFR BLD CALC: 30.3 % — LOW (ref 34.5–45)
HGB BLD-MCNC: 9.3 G/DL — LOW (ref 11.5–15.5)
MCHC RBC-ENTMCNC: 29.2 PG — SIGNIFICANT CHANGE UP (ref 27–34)
MCHC RBC-ENTMCNC: 30.7 GM/DL — LOW (ref 32–36)
MCV RBC AUTO: 95 FL — SIGNIFICANT CHANGE UP (ref 80–100)
NRBC # BLD: 0 /100 WBCS — SIGNIFICANT CHANGE UP (ref 0–0)
PLATELET # BLD AUTO: 122 K/UL — LOW (ref 150–400)
POTASSIUM SERPL-MCNC: 4.2 MMOL/L — SIGNIFICANT CHANGE UP (ref 3.5–5.3)
POTASSIUM SERPL-SCNC: 4.2 MMOL/L — SIGNIFICANT CHANGE UP (ref 3.5–5.3)
RBC # BLD: 3.19 M/UL — LOW (ref 3.8–5.2)
RBC # FLD: 13.8 % — SIGNIFICANT CHANGE UP (ref 10.3–14.5)
SODIUM SERPL-SCNC: 146 MMOL/L — HIGH (ref 135–145)
WBC # BLD: 6.83 K/UL — SIGNIFICANT CHANGE UP (ref 3.8–10.5)
WBC # FLD AUTO: 6.83 K/UL — SIGNIFICANT CHANGE UP (ref 3.8–10.5)

## 2021-03-10 PROCEDURE — 99232 SBSQ HOSP IP/OBS MODERATE 35: CPT

## 2021-03-10 PROCEDURE — 99232 SBSQ HOSP IP/OBS MODERATE 35: CPT | Mod: GC

## 2021-03-10 RX ORDER — ASPIRIN/CALCIUM CARB/MAGNESIUM 324 MG
81 TABLET ORAL
Refills: 0 | Status: DISCONTINUED | OUTPATIENT
Start: 2021-03-10 | End: 2021-03-10

## 2021-03-10 RX ORDER — ASPIRIN/CALCIUM CARB/MAGNESIUM 324 MG
325 TABLET ORAL
Refills: 0 | Status: DISCONTINUED | OUTPATIENT
Start: 2021-03-10 | End: 2021-03-17

## 2021-03-10 RX ORDER — SODIUM CHLORIDE 9 MG/ML
1000 INJECTION, SOLUTION INTRAVENOUS
Refills: 0 | Status: DISCONTINUED | OUTPATIENT
Start: 2021-03-10 | End: 2021-03-11

## 2021-03-10 RX ADMIN — Medication 81 MILLIGRAM(S): at 11:39

## 2021-03-10 RX ADMIN — Medication 650 MILLIGRAM(S): at 05:15

## 2021-03-10 RX ADMIN — Medication 1 PATCH: at 06:08

## 2021-03-10 RX ADMIN — ATENOLOL 100 MILLIGRAM(S): 25 TABLET ORAL at 05:16

## 2021-03-10 RX ADMIN — Medication 650 MILLIGRAM(S): at 19:20

## 2021-03-10 RX ADMIN — LIDOCAINE 1 PATCH: 4 CREAM TOPICAL at 05:17

## 2021-03-10 RX ADMIN — AMLODIPINE BESYLATE 5 MILLIGRAM(S): 2.5 TABLET ORAL at 05:16

## 2021-03-10 RX ADMIN — Medication 1 PATCH: at 11:56

## 2021-03-10 RX ADMIN — Medication 1 PATCH: at 11:40

## 2021-03-10 RX ADMIN — Medication 650 MILLIGRAM(S): at 12:56

## 2021-03-10 RX ADMIN — SODIUM CHLORIDE 70 MILLILITER(S): 9 INJECTION, SOLUTION INTRAVENOUS at 18:11

## 2021-03-10 RX ADMIN — LOSARTAN POTASSIUM 100 MILLIGRAM(S): 100 TABLET, FILM COATED ORAL at 05:16

## 2021-03-10 RX ADMIN — Medication 650 MILLIGRAM(S): at 17:40

## 2021-03-10 RX ADMIN — ATORVASTATIN CALCIUM 10 MILLIGRAM(S): 80 TABLET, FILM COATED ORAL at 21:35

## 2021-03-10 RX ADMIN — Medication 325 MILLIGRAM(S): at 18:11

## 2021-03-10 RX ADMIN — Medication 500 MILLIGRAM(S): at 17:40

## 2021-03-10 RX ADMIN — Medication 650 MILLIGRAM(S): at 00:55

## 2021-03-10 RX ADMIN — Medication 650 MILLIGRAM(S): at 23:06

## 2021-03-10 RX ADMIN — Medication 650 MILLIGRAM(S): at 11:39

## 2021-03-10 RX ADMIN — LIDOCAINE 1 PATCH: 4 CREAM TOPICAL at 19:57

## 2021-03-10 RX ADMIN — Medication 1 PATCH: at 19:58

## 2021-03-10 RX ADMIN — Medication 1 TABLET(S): at 11:40

## 2021-03-10 RX ADMIN — Medication 500 MILLIGRAM(S): at 05:16

## 2021-03-10 RX ADMIN — LIDOCAINE 1 PATCH: 4 CREAM TOPICAL at 06:08

## 2021-03-10 RX ADMIN — PANTOPRAZOLE SODIUM 40 MILLIGRAM(S): 20 TABLET, DELAYED RELEASE ORAL at 05:18

## 2021-03-10 RX ADMIN — Medication 650 MILLIGRAM(S): at 06:08

## 2021-03-10 NOTE — PROGRESS NOTE ADULT - ATTENDING COMMENTS
Rehab Attending- Patient seen and examined with NP Sunny- Case discussed, above note reviewed by me with modifications made    Dizzy this AM in OT  Spontaneously resolved  BP OK  BUN elevated- encouraged to drink more fluids  frequent BMs with senna yesterday  Plts 100K - lovenox DCd - HIT Ab sent  to continue intensive Rehab Program Rehab Attending- Patient seen and examined with NP Sunny- Case discussed, above note reviewed by me with modifications made    no further dizziness  hit AB +  off lovenox  ASA 325mg 2x/day on DC  BP OK  BUN elevated- IV D5 tonight  formed BM this AM  to continue intensive Rehab Program

## 2021-03-10 NOTE — PROGRESS NOTE ADULT - SUBJECTIVE AND OBJECTIVE BOX
80y with a history of HTN, CAD who presented to [facility] on 2/25 with complaint of right hip pain  and found to have right hip IT fracture  underwent right hip IMN on 2/27    seen at the bedside,       Vital Signs Last 24 Hrs  T(C): 36.9 (10 Mar 2021 07:32), Max: 36.9 (09 Mar 2021 21:22)  T(F): 98.4 (10 Mar 2021 07:32), Max: 98.4 (09 Mar 2021 21:22)  HR: 60 (10 Mar 2021 07:32) (60 - 76)  BP: 103/60 (10 Mar 2021 07:32) (103/60 - 145/62)  BP(mean): --  RR: 16 (10 Mar 2021 07:32) (15 - 16)  SpO2: 100% (10 Mar 2021 07:32) (95% - 100%)                    9.3                  146  | 28   | 39           6.83  >-----------< 122     ------------------------< 91                    30.3                 4.2  | 111  | 1.02                                         Ca 9.7   Mg x     Ph x        Heparin Induced Platelet Antibody (03.09.21 @ 14:10)    Heparin-PF4 AB Result: 4.4 u/mL    Heparin-PF4 AB Interp: Positive    MEDICATIONS  (STANDING):  acetaminophen   Tablet .. 650 milliGRAM(s) Oral every 6 hours  amLODIPine   Tablet 5 milliGRAM(s) Oral daily  ascorbic acid 500 milliGRAM(s) Oral two times a day  aspirin enteric coated 81 milliGRAM(s) Oral daily  ATENolol  Tablet 100 milliGRAM(s) Oral daily  atorvastatin 10 milliGRAM(s) Oral at bedtime  lidocaine   Patch 1 Patch Transdermal <User Schedule>  losartan 100 milliGRAM(s) Oral daily  multivitamin 1 Tablet(s) Oral daily  nicotine -   7 mG/24Hr(s) Patch 1 patch Transdermal daily  pantoprazole    Tablet 40 milliGRAM(s) Oral before breakfast    MEDICATIONS  (PRN):  polyethylene glycol 3350 17 Gram(s) Oral daily PRN Constipation  traMADol 50 milliGRAM(s) Oral every 4 hours PRN Severe Pain (7 - 10)  traMADol 25 milliGRAM(s) Oral every 4 hours PRN Moderate Pain (4 - 6)   80y with a history of HTN, CAD who presented to [facility] on 2/25 with complaint of right hip pain  and found to have right hip IT fracture  underwent right hip IMN on 2/27    seen at the bedside, no new complaints, no pain, doing well with PT, NO N/V, NO SOB      Vital Signs Last 24 Hrs  T(C): 36.9 (10 Mar 2021 07:32), Max: 36.9 (09 Mar 2021 21:22)  T(F): 98.4 (10 Mar 2021 07:32), Max: 98.4 (09 Mar 2021 21:22)  HR: 60 (10 Mar 2021 07:32) (60 - 76)  BP: 103/60 (10 Mar 2021 07:32) (103/60 - 145/62)  BP(mean): --  RR: 16 (10 Mar 2021 07:32) (15 - 16)  SpO2: 100% (10 Mar 2021 07:32) (95% - 100%)    GENERAL- NAD  EAR/NOSE/MOUTH/THROAT - no pharyngeal exudates, no oral leisions,  MMM  EYES- TIFF, conjunctiva and Sclera clear  NECK- supple  RESPIRATORY-  clear to auscultation bilaterally  CARDIOVASCULAR - SIS2, RRR  GI - soft NT BS present  EXTREMITIES- no pedal edema  NEUROLOGY- no gross focal deficits  SKIN- no rashes, warm to touch  PSYCHIATRY- AAO X 3                  9.3                  146  | 28   | 39           6.83  >-----------< 122     ------------------------< 91                    30.3                 4.2  | 111  | 1.02                                         Ca 9.7   Mg x     Ph x        Heparin Induced Platelet Antibody (03.09.21 @ 14:10)    Heparin-PF4 AB Result: 4.4 u/mL    Heparin-PF4 AB Interp: Positive    MEDICATIONS  (STANDING):  acetaminophen   Tablet .. 650 milliGRAM(s) Oral every 6 hours  amLODIPine   Tablet 5 milliGRAM(s) Oral daily  ascorbic acid 500 milliGRAM(s) Oral two times a day  aspirin enteric coated 81 milliGRAM(s) Oral daily  ATENolol  Tablet 100 milliGRAM(s) Oral daily  atorvastatin 10 milliGRAM(s) Oral at bedtime  lidocaine   Patch 1 Patch Transdermal <User Schedule>  losartan 100 milliGRAM(s) Oral daily  multivitamin 1 Tablet(s) Oral daily  nicotine -   7 mG/24Hr(s) Patch 1 patch Transdermal daily  pantoprazole    Tablet 40 milliGRAM(s) Oral before breakfast    MEDICATIONS  (PRN):  polyethylene glycol 3350 17 Gram(s) Oral daily PRN Constipation  traMADol 50 milliGRAM(s) Oral every 4 hours PRN Severe Pain (7 - 10)  traMADol 25 milliGRAM(s) Oral every 4 hours PRN Moderate Pain (4 - 6)

## 2021-03-10 NOTE — PROGRESS NOTE ADULT - SUBJECTIVE AND OBJECTIVE BOX
This is a 80 female with traumatic fall down 2 stairs at home. Landed on her right side and denies head trauma and LOC. Patient reports she was walking down her stairs and her shoes slipped causing her to spin over and fall. Denies hitting her head. Was unable to ambulate after the fall and thus son brought her to [facility]'s ER on 2/25. Pain reported at the right hip when she moved her leg, otherwise no pain anywhere else.   Patient found to have right IT fracture to right hip with comminution of the lesser trochanteric area.   Patient cleared for the OR and underwent right hip IMN on 2/27 by Dr Lora.  Post operatively, acute blood loss anemia noted.   Patient seen by OT, PT and PM&R. Recommendations made for acute IPR.   Patient deemed medically stable for dc to [facility]'s acute IPR on 3/4/2021.       Incidentally: Patient noted to have a 1.1 x 1.0 cm nodule in the right lower lobe medially adjacent to the IVC found on CT of abdomen and chest prior to surgery. Report reads: "While this may be benign, malignancy cannot be excluded. Further evaluation is necessary. If no old films are available for comparison, noncontrast chest CT is recommended to assess for additional nodules."       ROS:   No further dizziness.   doing well   last BM today   voiding spontaneously  pain controlled with tylenol  no nausea no vomiting  no chest pain  no  headaches        MEDICATIONS  (STANDING):  acetaminophen   Tablet .. 650 milliGRAM(s) Oral every 6 hours  amLODIPine   Tablet 5 milliGRAM(s) Oral daily  ascorbic acid 500 milliGRAM(s) Oral two times a day  aspirin enteric coated 325 milliGRAM(s) Oral two times a day  ATENolol  Tablet 100 milliGRAM(s) Oral daily  atorvastatin 10 milliGRAM(s) Oral at bedtime  lidocaine   Patch 1 Patch Transdermal <User Schedule>  losartan 100 milliGRAM(s) Oral daily  multivitamin 1 Tablet(s) Oral daily  nicotine -   7 mG/24Hr(s) Patch 1 patch Transdermal daily  pantoprazole    Tablet 40 milliGRAM(s) Oral before breakfast    MEDICATIONS  (PRN):  polyethylene glycol 3350 17 Gram(s) Oral daily PRN Constipation  traMADol 50 milliGRAM(s) Oral every 4 hours PRN Severe Pain (7 - 10)  traMADol 25 milliGRAM(s) Oral every 4 hours PRN Moderate Pain (4 - 6)             03-10    146<H>  |  111<H>  |  39<H>  ----------------------------<  91  4.2   |  28  |  1.02    Ca    9.7      10 Mar 2021 06:28    TPro  6.3  /  Alb  3.0<L>  /  TBili  0.5  /  DBili  x   /  AST  25  /  ALT  31  /  AlkPhos  88  03-09                        9.3    6.83  )-----------( 122      ( 10 Mar 2021 06:28 )             30.3     Heparin Induced Platelet Antibody (03.09.21 @ 14:10)    Heparin-PF4 AB Result: 4.4 u/mL    Heparin-PF4 AB Interp: Positive      Vital Signs Last 24 Hrs  T(C): 36.9 (10 Mar 2021 07:32), Max: 36.9 (09 Mar 2021 21:22)  T(F): 98.4 (10 Mar 2021 07:32), Max: 98.4 (09 Mar 2021 21:22)  HR: 60 (10 Mar 2021 07:32) (60 - 76)  BP: 103/60 (10 Mar 2021 07:32) (103/60 - 145/62)  RR: 16 (10 Mar 2021 07:32) (15 - 16)  SpO2: 100% (10 Mar 2021 07:32) (95% - 100%)    Physical Exam:   General: NAD, Resting Comfortable,                                  HEENT: NC/AT, EOM I, PERRLA, Normal Conjunctivae  Cardio: RRR, Normal S1-S2, No M/G/R                              Pulm: No Respiratory Distress,  Lungs CTAB                        Abdomen: ND/NT, Soft, BS+                                                MSK: No joint swelling, Full ROM                                         Ext: No C/C/E, No calf tenderness    Skin:  right hip incision with glue 2 incisions 2 intact blisters inferior to distal incision                                                             Wounds: none  Decubitus Ulcers: None Present     Neurological Examination    Cognitive: AAO x 3                                                                         Attention: Intact   Judgment: Good evidence of judgement                               Memory: Recall 3 objects immediate and 3 min later      Mood/Affect: wnl                                                                           Communication:  Fluent,  No dysarthria   Swallow: intact  CN II - XII  intact  Coordination: FTN/HTS intact                                                                              Sensory: Intact to light touch, PP and Vibration                                                                                             Tone: normal Throughout   Balance: impaired    Motor    LEFT    UE: SF [5/5], EF [5/5], EE [5/5], WE [5/5],  [wnl]  RIGHT UE: SF [5/5], EF [5/5], EE [5/5], WE [5/5],  [wnl]  LEFT    LE:  HF [5/5], KE [5/5], DF [5/5], EHL [5/5],  PF [5/5]  RIGHT LE:  HF [2/5], KE [4/5], DF [5/5], EHL [5/5],  PF [5/5]      Reflex:  2 + throughout Guardado/Babinski negative      IDT meeting 3/8      OT: min A LBD   cs toileting, commode transfer   min A tub transfer       PT: CS to CG   min A to move leg in and   CS 80ft   steps with 2 rails min A     Tentative dc home on 3/17 with home care services.

## 2021-03-10 NOTE — PROGRESS NOTE ADULT - ASSESSMENT
80y with a history of HTN, CAD who presented to [facility] on 2/25 with complaint of right hip pain  and found to have right hip IT fracture. Hospital course complicated by postoperative blood loss anemia. Now admitted to [facility] after for initiation of a multidisciplinary rehab program consisting focused on functional mobility, transfers and ADLs (activities of daily living)- pt/ot/dvt ppx    HTN  -Continue norvasc, losartan and atenolol  -Monitor SBPs     CAD  -Continue ASA and statin     thrombocytopenia, Heparin-PF4 AB Interp: Positive,   - stop Lovenox  - asa 81 bid for dvt ppx    will follow  d/w dr. donnelly

## 2021-03-10 NOTE — PROGRESS NOTE ADULT - ASSESSMENT
SOY TOVAR is a 80y with a history of HTN, CAD who presented to [facility] on 2/25 with complaint of right hip pain  and found to have right hip IT fracture. Hospital course complicated by postoperative blood loss anemia. Now admitted to [facility] after for initiation of a multidisciplinary rehab program consisting focused on functional mobility, transfers and ADLs (activities of daily living).      Comprehensive Multidisciplinary Rehab Program:  - Continue comprehensive rehab program, 3 hours a day, 5 days a week.  - PT 2hr/day: Focused on improving strength, endurance, coordination, balance, functional mobility, and transfers  - OT 1hr/day: Focused on improving strength, fine motor skills, coordination, posture and ADLs.    - Activity Limitations: Decreased social, vocational and leisure activities, decreased self care and ADLs, decreased mobility, decreased ability to manage household and finances.     Participation Restrictions/Precautions:  - Weight bearing status: WBAT  - Precautions:    [x ] Falls   [ ] Spinal   [ ] Hip (Anterior or Posterior)       [ ] Seizure  [ ] Cardiac   [ ] Sternal    Rehab Diagnosis/Management:  Sleep:   - Maintain quiet hours and low stim environment.      Pain Management:  - Tylenol q 6 hrs, tramadol prn, ice packs, lidocaine patch     GI/Bowel:  - At risk for constipation due to neurologic diagnosis, immobility and/or medication use  - Senna QHS  - GI ppx: protonix     /Bladder:   - At risk for incontinence and retention due to neurologic diagnosis and limited mobility  - Currently patient voids:    [x ] independent     [ ] external collection device (condom cath)    [ ] Indwelling villegas catheter     [ ] Intermittent catheterization  - baseline PVR - 3cc  - Encourage timed voids every 4 hours while awake for independence and to promote continence during therapy.    Skin/Pressure Injury:   - At risk for pressure injury due to neurologic diagnosis and relative immobility.  - Skin assessment on admission admission: 2 small blisters inferior to incision   - Monitor Incisions: 2 hip incisions intact with glue  - Turn every 2 hours while in bed, air mattress  - Soft heel protectors  - Skin barrier cream as needed  - Nursing to monitor skin Qshift    Diet/Dysphagia:  - Diet Consistency/Modifications: Regular diet   - Supplements: Ensure TID       DVT ppx: lovenox   - SCDs  - Last Doppler on N/A    -------------  Comorbid Medical Management:    HTN  -Continue norvasc, losartan and atenolol  -Monitor SBPs     CAD  -Continue ASA and statin     OMARI  BUN 32 Cr1.1-->32/0.94--> 39/1.02  encourage fluids  CMP Thursday    thrombocytosis  plts 100k 3/7 and 3/9, 122 today   HIT antibody +   Stopped lovenox 3/9  Per ortho office, started ASA 325mg BID      ---------------      Outpatient Follow-up (Specialty/Name of physician): Dr Lora ([facility])    --------------  Goals: Safe discharge to home  Estimated Length of Stay: 10-14 days  Rehab Potential: Good  Medical Prognosis: Good  Estimated Disposition: Home with Home Care  ---------------   SOY TOVAR is a 80y with a history of HTN, CAD who presented to [facility] on 2/25 with complaint of right hip pain  and found to have right hip IT fracture. Hospital course complicated by postoperative blood loss anemia. Now admitted to [facility] after for initiation of a multidisciplinary rehab program consisting focused on functional mobility, transfers and ADLs (activities of daily living).      Comprehensive Multidisciplinary Rehab Program:  - Continue comprehensive rehab program, 3 hours a day, 5 days a week.  - PT 2hr/day: Focused on improving strength, endurance, coordination, balance, functional mobility, and transfers  - OT 1hr/day: Focused on improving strength, fine motor skills, coordination, posture and ADLs.    - Activity Limitations: Decreased social, vocational and leisure activities, decreased self care and ADLs, decreased mobility, decreased ability to manage household and finances.     Participation Restrictions/Precautions:  - Weight bearing status: WBAT  - Precautions:    [x ] Falls   [ ] Spinal   [ ] Hip (Anterior or Posterior)       [ ] Seizure  [ ] Cardiac   [ ] Sternal    Rehab Diagnosis/Management:  Sleep:   - Maintain quiet hours and low stim environment.      Pain Management:  - Tylenol q 6 hrs, tramadol prn, ice packs, lidocaine patch     GI/Bowel:  - At risk for constipation due to neurologic diagnosis, immobility and/or medication use  - Senna QHS  - GI ppx: protonix     /Bladder:   - At risk for incontinence and retention due to neurologic diagnosis and limited mobility  - Currently patient voids:    [x ] independent     [ ] external collection device (condom cath)    [ ] Indwelling villegas catheter     [ ] Intermittent catheterization  - baseline PVR - 3cc  - Encourage timed voids every 4 hours while awake for independence and to promote continence during therapy.    Skin/Pressure Injury:   - At risk for pressure injury due to neurologic diagnosis and relative immobility.  - Skin assessment on admission admission: 2 small blisters inferior to incision   - Monitor Incisions: 2 hip incisions intact with glue  - Turn every 2 hours while in bed, air mattress  - Soft heel protectors  - Skin barrier cream as needed  - Nursing to monitor skin Qshift    Diet/Dysphagia:  - Diet Consistency/Modifications: Regular diet   - Supplements: Ensure TID       DVT ppx: lovenox   - SCDs  - Last Doppler on N/A    -------------  Comorbid Medical Management:    HTN  -Continue norvasc, losartan and atenolol  -Monitor SBPs     CAD  -Continue ASA and statin     OMARI  BUN 32 Cr1.1-->32/0.94--> 39/1.02  encourage fluids  IV D5 Overnight  CMP Thursday    thrombocytosis  plts 100k 3/7 and 3/9, 122 today   HIT antibody +   Stopped lovenox 3/9  Per ortho office, started ASA 325mg BID      ---------------      Outpatient Follow-up (Specialty/Name of physician): Dr Lora ([facility])    --------------  Goals: Safe discharge to home  Estimated Length of Stay: 10-14 days  Rehab Potential: Good  Medical Prognosis: Good  Estimated Disposition: Home with Home Care  ---------------

## 2021-03-11 LAB
ALBUMIN SERPL ELPH-MCNC: 3.3 G/DL — SIGNIFICANT CHANGE UP (ref 3.3–5)
ALP SERPL-CCNC: 129 U/L — HIGH (ref 40–120)
ALT FLD-CCNC: 58 U/L — HIGH (ref 10–45)
ANION GAP SERPL CALC-SCNC: 8 MMOL/L — SIGNIFICANT CHANGE UP (ref 5–17)
AST SERPL-CCNC: 42 U/L — HIGH (ref 10–40)
BILIRUB SERPL-MCNC: 0.4 MG/DL — SIGNIFICANT CHANGE UP (ref 0.2–1.2)
BUN SERPL-MCNC: 34 MG/DL — HIGH (ref 7–23)
CALCIUM SERPL-MCNC: 9.8 MG/DL — SIGNIFICANT CHANGE UP (ref 8.4–10.5)
CHLORIDE SERPL-SCNC: 107 MMOL/L — SIGNIFICANT CHANGE UP (ref 96–108)
CO2 SERPL-SCNC: 28 MMOL/L — SIGNIFICANT CHANGE UP (ref 22–31)
CREAT SERPL-MCNC: 1.06 MG/DL — SIGNIFICANT CHANGE UP (ref 0.5–1.3)
GLUCOSE SERPL-MCNC: 100 MG/DL — HIGH (ref 70–99)
HCT VFR BLD CALC: 32.3 % — LOW (ref 34.5–45)
HGB BLD-MCNC: 10 G/DL — LOW (ref 11.5–15.5)
MCHC RBC-ENTMCNC: 29.7 PG — SIGNIFICANT CHANGE UP (ref 27–34)
MCHC RBC-ENTMCNC: 31 GM/DL — LOW (ref 32–36)
MCV RBC AUTO: 95.8 FL — SIGNIFICANT CHANGE UP (ref 80–100)
NRBC # BLD: 0 /100 WBCS — SIGNIFICANT CHANGE UP (ref 0–0)
PLATELET # BLD AUTO: 157 K/UL — SIGNIFICANT CHANGE UP (ref 150–400)
POTASSIUM SERPL-MCNC: 4 MMOL/L — SIGNIFICANT CHANGE UP (ref 3.5–5.3)
POTASSIUM SERPL-SCNC: 4 MMOL/L — SIGNIFICANT CHANGE UP (ref 3.5–5.3)
PROT SERPL-MCNC: 7.2 G/DL — SIGNIFICANT CHANGE UP (ref 6–8.3)
RBC # BLD: 3.37 M/UL — LOW (ref 3.8–5.2)
RBC # FLD: 14.2 % — SIGNIFICANT CHANGE UP (ref 10.3–14.5)
SARS-COV-2 RNA SPEC QL NAA+PROBE: SIGNIFICANT CHANGE UP
SODIUM SERPL-SCNC: 143 MMOL/L — SIGNIFICANT CHANGE UP (ref 135–145)
WBC # BLD: 8.34 K/UL — SIGNIFICANT CHANGE UP (ref 3.8–10.5)
WBC # FLD AUTO: 8.34 K/UL — SIGNIFICANT CHANGE UP (ref 3.8–10.5)

## 2021-03-11 PROCEDURE — 99232 SBSQ HOSP IP/OBS MODERATE 35: CPT | Mod: GC

## 2021-03-11 PROCEDURE — 99232 SBSQ HOSP IP/OBS MODERATE 35: CPT

## 2021-03-11 RX ADMIN — LOSARTAN POTASSIUM 100 MILLIGRAM(S): 100 TABLET, FILM COATED ORAL at 06:03

## 2021-03-11 RX ADMIN — Medication 1 PATCH: at 06:46

## 2021-03-11 RX ADMIN — Medication 1 PATCH: at 11:32

## 2021-03-11 RX ADMIN — Medication 325 MILLIGRAM(S): at 06:03

## 2021-03-11 RX ADMIN — Medication 500 MILLIGRAM(S): at 06:03

## 2021-03-11 RX ADMIN — Medication 325 MILLIGRAM(S): at 17:42

## 2021-03-11 RX ADMIN — Medication 500 MILLIGRAM(S): at 17:41

## 2021-03-11 RX ADMIN — ATENOLOL 100 MILLIGRAM(S): 25 TABLET ORAL at 06:03

## 2021-03-11 RX ADMIN — Medication 650 MILLIGRAM(S): at 12:07

## 2021-03-11 RX ADMIN — Medication 650 MILLIGRAM(S): at 11:26

## 2021-03-11 RX ADMIN — PANTOPRAZOLE SODIUM 40 MILLIGRAM(S): 20 TABLET, DELAYED RELEASE ORAL at 06:03

## 2021-03-11 RX ADMIN — ATORVASTATIN CALCIUM 10 MILLIGRAM(S): 80 TABLET, FILM COATED ORAL at 21:15

## 2021-03-11 RX ADMIN — Medication 1 TABLET(S): at 11:26

## 2021-03-11 RX ADMIN — Medication 1 PATCH: at 11:26

## 2021-03-11 RX ADMIN — LIDOCAINE 1 PATCH: 4 CREAM TOPICAL at 06:45

## 2021-03-11 RX ADMIN — Medication 650 MILLIGRAM(S): at 00:28

## 2021-03-11 RX ADMIN — AMLODIPINE BESYLATE 5 MILLIGRAM(S): 2.5 TABLET ORAL at 06:03

## 2021-03-11 RX ADMIN — Medication 650 MILLIGRAM(S): at 06:45

## 2021-03-11 RX ADMIN — Medication 650 MILLIGRAM(S): at 06:03

## 2021-03-11 RX ADMIN — Medication 1 PATCH: at 20:21

## 2021-03-11 RX ADMIN — LIDOCAINE 1 PATCH: 4 CREAM TOPICAL at 06:04

## 2021-03-11 NOTE — PROGRESS NOTE ADULT - ASSESSMENT
80y with a history of HTN, CAD who presented to [facility] on 2/25 with complaint of right hip pain  and found to have right hip IT fracture. Hospital course complicated by postoperative blood loss anemia. Now admitted to [facility] after for initiation of a multidisciplinary rehab program consisting focused on functional mobility, transfers and ADLs (activities of daily living)- pt/ot/dvt ppx    HTN  -Continue norvasc, losartan and atenolol  -Monitor SBPs     CAD  -Continue ASA and statin     thrombocytopenia, Heparin-PF4 AB Interp: Positive,   - PLATELET Count improved  - stopped Lovenox 3/9/21  - asa 81 bid for dvt ppx    will follow  d/w dr. donnelly

## 2021-03-11 NOTE — PROGRESS NOTE ADULT - ATTENDING COMMENTS
Rehab Attending- Patient seen and examined with NP Sunny- Case discussed, above note reviewed by me with modifications made    no further dizziness  hit AB +  off lovenox  ASA 325mg 2x/day on DC  BP OK  BUN elevated- IV D5 tonight  formed BM this AM  to continue intensive Rehab Program Rehab Attending- Patient seen and examined with NP Sunny- Case discussed, above note reviewed by me with modifications made    Labs Reviewed  no further dizziness  Plts Better 150K  LFTs improved  off lovenox  ASA 325mg 2x/day on DC  BP OK  BUN still elevated- IV D5 given last night  wants to hold off on IV fluids-push PO through weekend  formed BM this AM  to continue intensive Rehab Program

## 2021-03-11 NOTE — PROGRESS NOTE ADULT - SUBJECTIVE AND OBJECTIVE BOX
This is a 80 female with traumatic fall down 2 stairs at home. Landed on her right side and denies head trauma and LOC. Patient reports she was walking down her stairs and her shoes slipped causing her to spin over and fall. Denies hitting her head. Was unable to ambulate after the fall and thus son brought her to [facility]'s ER on 2/25. Pain reported at the right hip when she moved her leg, otherwise no pain anywhere else.   Patient found to have right IT fracture to right hip with comminution of the lesser trochanteric area.   Patient cleared for the OR and underwent right hip IMN on 2/27 by Dr Lora.  Post operatively, acute blood loss anemia noted.   Patient seen by OT, PT and PM&R. Recommendations made for acute IPR.   Patient deemed medically stable for dc to [facility]'s acute IPR on 3/4/2021.       Incidentally: Patient noted to have a 1.1 x 1.0 cm nodule in the right lower lobe medially adjacent to the IVC found on CT of abdomen and chest prior to surgery. Report reads: "While this may be benign, malignancy cannot be excluded. Further evaluation is necessary. If no old films are available for comparison, noncontrast chest CT is recommended to assess for additional nodules."       ROS:   No further dizziness.   doing well   last BM today 3/11  voiding spontaneously  pain controlled with tylenol  no nausea no vomiting  no chest pain  no  headaches      MEDICATIONS  (STANDING):  amLODIPine   Tablet 5 milliGRAM(s) Oral daily  ascorbic acid 500 milliGRAM(s) Oral two times a day  aspirin enteric coated 325 milliGRAM(s) Oral two times a day  ATENolol  Tablet 100 milliGRAM(s) Oral daily  atorvastatin 10 milliGRAM(s) Oral at bedtime  dextrose 5%. 1000 milliLiter(s) (70 mL/Hr) IV Continuous <Continuous>  lidocaine   Patch 1 Patch Transdermal <User Schedule>  losartan 100 milliGRAM(s) Oral daily  multivitamin 1 Tablet(s) Oral daily  nicotine -   7 mG/24Hr(s) Patch 1 patch Transdermal daily  pantoprazole    Tablet 40 milliGRAM(s) Oral before breakfast    MEDICATIONS  (PRN):  polyethylene glycol 3350 17 Gram(s) Oral daily PRN Constipation  traMADol 25 milliGRAM(s) Oral every 4 hours PRN Moderate Pain (4 - 6)  traMADol 50 milliGRAM(s) Oral every 4 hours PRN Severe Pain (7 - 10)             03-11    143  |  107  |  34<H>  ----------------------------<  100<H>  4.0   |  28  |  1.06    Ca    9.8      11 Mar 2021 05:30    TPro  7.2  /  Alb  3.3  /  TBili  0.4  /  DBili  x   /  AST  42<H>  /  ALT  58<H>  /  AlkPhos  129<H>  03-11                        10.0   8.34  )-----------( 157      ( 11 Mar 2021 05:30 )             32.3       Heparin Induced Platelet Antibody (03.09.21 @ 14:10)    Heparin-PF4 AB Result: 4.4 u/mL    Heparin-PF4 AB Interp: Positive      Vital Signs Last 24 Hrs  T(C): 36.8 (11 Mar 2021 07:17), Max: 36.8 (11 Mar 2021 07:17)  T(F): 98.2 (11 Mar 2021 07:17), Max: 98.2 (11 Mar 2021 07:17)  HR: 80 (11 Mar 2021 07:17) (67 - 80)  BP: 136/66 (11 Mar 2021 07:17) (122/62 - 143/60)  RR: 16 (11 Mar 2021 07:17) (15 - 16)  SpO2: 96% (11 Mar 2021 07:17) (96% - 97%)      Physical Exam:   General: NAD, Resting Comfortable,                                  HEENT: NC/AT, EOM I, PERRLA, Normal Conjunctivae  Cardio: RRR, Normal S1-S2, No M/G/R                              Pulm: No Respiratory Distress,  Lungs CTAB                        Abdomen: ND/NT, Soft, BS+                                                MSK: No joint swelling, Full ROM                                         Ext: No C/C/E, No calf tenderness    Skin:  right hip incision with glue 2 incisions 2 intact blisters inferior to distal incision                                                             Wounds: none  Decubitus Ulcers: None Present     Neurological Examination    Cognitive: AAO x 3                                                                         Attention: Intact   Judgment: Good evidence of judgement                               Memory: Recall 3 objects immediate and 3 min later      Mood/Affect: wnl                                                                           Communication:  Fluent,  No dysarthria   Swallow: intact  CN II - XII  intact  Coordination: FTN/HTS intact                                                                              Sensory: Intact to light touch, PP and Vibration                                                                                             Tone: normal Throughout   Balance: impaired    Motor    LEFT    UE: SF [5/5], EF [5/5], EE [5/5], WE [5/5],  [wnl]  RIGHT UE: SF [5/5], EF [5/5], EE [5/5], WE [5/5],  [wnl]  LEFT    LE:  HF [5/5], KE [5/5], DF [5/5], EHL [5/5],  PF [5/5]  RIGHT LE:  HF [2/5], KE [4/5], DF [5/5], EHL [5/5],  PF [5/5]      Reflex:  2 + throughout Guardado/Babinski negative      IDT meeting 3/8      OT: min A LBD   cs toileting, commode transfer   min A tub transfer       PT: CS to CG   min A to move leg in and   CS 80ft   steps with 2 rails min A     Tentative dc home on 3/17 with home care services.   This is a 80 female with traumatic fall down 2 stairs at home. Landed on her right side and denies head trauma and LOC. Patient reports she was walking down her stairs and her shoes slipped causing her to spin over and fall. Denies hitting her head. Was unable to ambulate after the fall and thus son brought her to [facility]'s ER on 2/25. Pain reported at the right hip when she moved her leg, otherwise no pain anywhere else.   Patient found to have right IT fracture to right hip with comminution of the lesser trochanteric area.   Patient cleared for the OR and underwent right hip IMN on 2/27 by Dr Lora.  Post operatively, acute blood loss anemia noted.   Patient seen by OT, PT and PM&R. Recommendations made for acute IPR.   Patient deemed medically stable for dc to [facility]'s acute IPR on 3/4/2021.       Incidentally: Patient noted to have a 1.1 x 1.0 cm nodule in the right lower lobe medially adjacent to the IVC found on CT of abdomen and chest prior to surgery. Report reads: "While this may be benign, malignancy cannot be excluded. Further evaluation is necessary. If no old films are available for comparison, noncontrast chest CT is recommended to assess for additional nodules."       ROS:   No further dizziness.   doing well   last BM today 3/11  voiding spontaneously  pain controlled with tylenol  no nausea no vomiting  no chest pain  no  headaches  patient against further IV Fluids- will take in more PO and check labs on Monday  If Needed, Agrees to IV fluid on Monday      MEDICATIONS  (STANDING):  amLODIPine   Tablet 5 milliGRAM(s) Oral daily  ascorbic acid 500 milliGRAM(s) Oral two times a day  aspirin enteric coated 325 milliGRAM(s) Oral two times a day  ATENolol  Tablet 100 milliGRAM(s) Oral daily  atorvastatin 10 milliGRAM(s) Oral at bedtime  dextrose 5%. 1000 milliLiter(s) (70 mL/Hr) IV Continuous <Continuous>  lidocaine   Patch 1 Patch Transdermal <User Schedule>  losartan 100 milliGRAM(s) Oral daily  multivitamin 1 Tablet(s) Oral daily  nicotine -   7 mG/24Hr(s) Patch 1 patch Transdermal daily  pantoprazole    Tablet 40 milliGRAM(s) Oral before breakfast    MEDICATIONS  (PRN):  polyethylene glycol 3350 17 Gram(s) Oral daily PRN Constipation  traMADol 25 milliGRAM(s) Oral every 4 hours PRN Moderate Pain (4 - 6)  traMADol 50 milliGRAM(s) Oral every 4 hours PRN Severe Pain (7 - 10)             03-11    143  |  107  |  34<H>  ----------------------------<  100<H>  4.0   |  28  |  1.06    Ca    9.8      11 Mar 2021 05:30    TPro  7.2  /  Alb  3.3  /  TBili  0.4  /  DBili  x   /  AST  42<H>  /  ALT  58<H>  /  AlkPhos  129<H>  03-11                        10.0   8.34  )-----------( 157      ( 11 Mar 2021 05:30 )             32.3       Heparin Induced Platelet Antibody (03.09.21 @ 14:10)    Heparin-PF4 AB Result: 4.4 u/mL    Heparin-PF4 AB Interp: Positive      Vital Signs Last 24 Hrs  T(C): 36.8 (11 Mar 2021 07:17), Max: 36.8 (11 Mar 2021 07:17)  T(F): 98.2 (11 Mar 2021 07:17), Max: 98.2 (11 Mar 2021 07:17)  HR: 80 (11 Mar 2021 07:17) (67 - 80)  BP: 136/66 (11 Mar 2021 07:17) (122/62 - 143/60)  RR: 16 (11 Mar 2021 07:17) (15 - 16)  SpO2: 96% (11 Mar 2021 07:17) (96% - 97%)      Physical Exam:   General: NAD, Resting Comfortable,                                  HEENT: NC/AT, EOM I, PERRLA, Normal Conjunctivae  Cardio: RRR, Normal S1-S2, No M/G/R                              Pulm: No Respiratory Distress,  Lungs CTAB                        Abdomen: ND/NT, Soft, BS+                                                MSK: No joint swelling, Full ROM                                         Ext: No C/C/E, No calf tenderness    Skin:  right hip incision with glue 2 incisions intact- blisters gone                                                           Wounds: none  Decubitus Ulcers: None Present     Neurological Examination    Cognitive: AAO x 3                                                                         Attention: Intact   Judgment: Good evidence of judgement                               Memory: Recall 3 objects immediate and 3 min later      Mood/Affect: wnl                                                                           Communication:  Fluent,  No dysarthria   Swallow: intact  CN II - XII  intact  Coordination: FTN/HTS intact                                                                              Sensory: Intact to light touch, PP and Vibration                                                                                             Tone: normal Throughout   Balance: impaired    Motor    LEFT    UE: SF [5/5], EF [5/5], EE [5/5], WE [5/5],  [wnl]  RIGHT UE: SF [5/5], EF [5/5], EE [5/5], WE [5/5],  [wnl]  LEFT    LE:  HF [5/5], KE [5/5], DF [5/5], EHL [5/5],  PF [5/5]  RIGHT LE:  HF [2/5], KE [4/5], DF [5/5], EHL [5/5],  PF [5/5]      Reflex:  2 + throughout Guardado/Babinski negative      IDT meeting 3/8      OT: min A LBD   cs toileting, commode transfer   min A tub transfer       PT: CS to CG   min A to move leg in and   CS 80ft   steps with 2 rails min A     Tentative dc home on 3/17 with home care services.

## 2021-03-11 NOTE — PROGRESS NOTE ADULT - ASSESSMENT
SOY TOVAR is a 80y with a history of HTN, CAD who presented to [facility] on 2/25 with complaint of right hip pain  and found to have right hip IT fracture. Hospital course complicated by postoperative blood loss anemia. Now admitted to [facility] after for initiation of a multidisciplinary rehab program consisting focused on functional mobility, transfers and ADLs (activities of daily living).      Comprehensive Multidisciplinary Rehab Program:  - Continue comprehensive rehab program, 3 hours a day, 5 days a week.  - PT 2hr/day: Focused on improving strength, endurance, coordination, balance, functional mobility, and transfers  - OT 1hr/day: Focused on improving strength, fine motor skills, coordination, posture and ADLs.    - Activity Limitations: Decreased social, vocational and leisure activities, decreased self care and ADLs, decreased mobility, decreased ability to manage household and finances.     Participation Restrictions/Precautions:  - Weight bearing status: WBAT  - Precautions:    [x ] Falls   [ ] Spinal   [ ] Hip (Anterior or Posterior)       [ ] Seizure  [ ] Cardiac   [ ] Sternal    Rehab Diagnosis/Management:  Sleep:   - Maintain quiet hours and low stim environment.      Pain Management:  - LFTs trending up-dc tylenol, tramadol prn, ice packs, lidocaine patch     GI/Bowel:  - At risk for constipation due to neurologic diagnosis, immobility and/or medication use  - Senna QHS  - GI ppx: protonix     /Bladder:   - At risk for incontinence and retention due to neurologic diagnosis and limited mobility  - Currently patient voids:    [x ] independent     [ ] external collection device (condom cath)    [ ] Indwelling villegas catheter     [ ] Intermittent catheterization  - baseline PVR - 3cc  - Encourage timed voids every 4 hours while awake for independence and to promote continence during therapy.    Skin/Pressure Injury:   - At risk for pressure injury due to neurologic diagnosis and relative immobility.  - Skin assessment on admission admission: 2 small blisters inferior to incision   - Monitor Incisions: 2 hip incisions intact with glue  - Turn every 2 hours while in bed, air mattress  - Soft heel protectors  - Skin barrier cream as needed  - Nursing to monitor skin Qshift    Diet/Dysphagia:  - Diet Consistency/Modifications: Regular diet   - Supplements: Ensure TID       DVT ppx: lovenox   - SCDs  - Last Doppler on N/A    -------------  Comorbid Medical Management:    Elevated LFTs  -Stop tylenol for now  -Monitor CMP      HTN  -Continue norvasc, losartan and atenolol  -Monitor SBPs     CAD  -Continue ASA and statin     OMARI  BUN 32 Cr1.1-->32/0.94--> 39/1.02-->34/1.06  encourage fluids  IV D5 given last night (Na improved)  CMP Monday     thrombocytosis  plts 100k 3/7 and 3/9, 122 3/10, 157 today   HIT antibody +   Stopped lovenox 3/9  Per ortho office, started ASA 325mg BID on 3/10      ---------------      Outpatient Follow-up (Specialty/Name of physician): Dr Lora (ortho)    --------------  Goals: Safe discharge to home  Estimated Length of Stay: 10-14 days  Rehab Potential: Good  Medical Prognosis: Good  Estimated Disposition: Home with Home Care  ---------------   SOY TOVAR is a 80y with a history of HTN, CAD who presented to [facility] on 2/25 with complaint of right hip pain  and found to have right hip IT fracture. Hospital course complicated by postoperative blood loss anemia. Now admitted to [facility] after for initiation of a multidisciplinary rehab program consisting focused on functional mobility, transfers and ADLs (activities of daily living).      Comprehensive Multidisciplinary Rehab Program:  - Continue comprehensive rehab program, 3 hours a day, 5 days a week.  - PT 2hr/day: Focused on improving strength, endurance, coordination, balance, functional mobility, and transfers  - OT 1hr/day: Focused on improving strength, fine motor skills, coordination, posture and ADLs.    - Activity Limitations: Decreased social, vocational and leisure activities, decreased self care and ADLs, decreased mobility, decreased ability to manage household and finances.     Participation Restrictions/Precautions:  - Weight bearing status: WBAT  - Precautions:    [x ] Falls   [ ] Spinal   [ ] Hip (Anterior or Posterior)       [ ] Seizure  [ ] Cardiac   [ ] Sternal    Rehab Diagnosis/Management:  Sleep:   - Maintain quiet hours and low stim environment.      Pain Management:  - LFTs trending up-dc tylenol, tramadol prn, ice packs, lidocaine patch     GI/Bowel:  - At risk for constipation due to neurologic diagnosis, immobility and/or medication use  - Senna QHS  - GI ppx: protonix     /Bladder:   - At risk for incontinence and retention due to neurologic diagnosis and limited mobility  - Currently patient voids:    [x ] independent     [ ] external collection device (condom cath)    [ ] Indwelling villegas catheter     [ ] Intermittent catheterization  - baseline PVR - 3cc  - Encourage timed voids every 4 hours while awake for independence and to promote continence during therapy.    Skin/Pressure Injury:   - At risk for pressure injury due to neurologic diagnosis and relative immobility.  - Skin assessment on admission admission: 2 small blisters inferior to incision   - Monitor Incisions: 2 hip incisions intact with glue  - Turn every 2 hours while in bed, air mattress  - Soft heel protectors  - Skin barrier cream as needed  - Nursing to monitor skin Qshift    Diet/Dysphagia:  - Diet Consistency/Modifications: Regular diet   - Supplements: Ensure TID       DVT ppx: lovenox   - SCDs  - Last Doppler on N/A    -------------  Comorbid Medical Management:    Elevated LFTs  -Stop tylenol for now  repeat LFTs better  -Monitor CMP      HTN  -Continue norvasc, losartan and atenolol  -Monitor SBPs     CAD  -Continue ASA and statin     OMARI  BUN 32 Cr1.1-->32/0.94--> 39/1.02-->34/1.06  encourage fluids  IV D5 given last night (Na improved)  CMP Monday     thrombocytosis  plts 100k 3/7 and 3/9, 122 3/10, 157 today   HIT antibody +   Stopped lovenox 3/9  Per ortho office, started ASA 325mg BID on 3/10      ---------------      Outpatient Follow-up (Specialty/Name of physician): Dr Lora (ortho)    --------------  Goals: Safe discharge to home  Estimated Length of Stay: 10-14 days  Rehab Potential: Good  Medical Prognosis: Good  Estimated Disposition: Home with Home Care  ---------------

## 2021-03-11 NOTE — PROGRESS NOTE ADULT - SUBJECTIVE AND OBJECTIVE BOX
80y with a history of HTN, CAD who presented to [facility] on 2/25 with complaint of right hip pain  and found to have right hip IT fracture  underwent right hip IMN on 2/27    seen at the bedside, no new complaints, no pain, doing well with PT, NO N/V, NO SOB      Vital Signs Last 24 Hrs  T(C): 36.8 (11 Mar 2021 07:17), Max: 36.8 (11 Mar 2021 07:17)  T(F): 98.2 (11 Mar 2021 07:17), Max: 98.2 (11 Mar 2021 07:17)  HR: 80 (11 Mar 2021 07:17) (67 - 80)  BP: 136/66 (11 Mar 2021 07:17) (122/62 - 143/60)  BP(mean): --  RR: 16 (11 Mar 2021 07:17) (15 - 16)  SpO2: 96% (11 Mar 2021 07:17) (96% - 97%)    GENERAL- NAD  EAR/NOSE/MOUTH/THROAT - no pharyngeal exudates, no oral leisions,  MMM  EYES- TIFF, conjunctiva and Sclera clear  NECK- supple  RESPIRATORY-  clear to auscultation bilaterally  CARDIOVASCULAR - SIS2, RRR  GI - soft NT BS present  EXTREMITIES- no pedal edema  NEUROLOGY- no gross focal deficits  SKIN- no rashes, warm to touch  PSYCHIATRY- AAO X 3                10.0                 143  | 28   | 34           8.34  >-----------< 157     ------------------------< 100                   32.3                 4.0  | 107  | 1.06                                         Ca 9.8   Mg x     Ph x          Heparin Induced Platelet Antibody (03.09.21 @ 14:10)    Heparin-PF4 AB Result: 4.4 u/mL    Heparin-PF4 AB Interp: Positive    MEDICATIONS  (STANDING):  amLODIPine   Tablet 5 milliGRAM(s) Oral daily  ascorbic acid 500 milliGRAM(s) Oral two times a day  aspirin enteric coated 325 milliGRAM(s) Oral two times a day  ATENolol  Tablet 100 milliGRAM(s) Oral daily  atorvastatin 10 milliGRAM(s) Oral at bedtime  dextrose 5%. 1000 milliLiter(s) (70 mL/Hr) IV Continuous <Continuous>  lidocaine   Patch 1 Patch Transdermal <User Schedule>  losartan 100 milliGRAM(s) Oral daily  multivitamin 1 Tablet(s) Oral daily  nicotine -   7 mG/24Hr(s) Patch 1 patch Transdermal daily  pantoprazole    Tablet 40 milliGRAM(s) Oral before breakfast    MEDICATIONS  (PRN):  polyethylene glycol 3350 17 Gram(s) Oral daily PRN Constipation  traMADol 25 milliGRAM(s) Oral every 4 hours PRN Moderate Pain (4 - 6)  traMADol 50 milliGRAM(s) Oral every 4 hours PRN Severe Pain (7 - 10)

## 2021-03-12 PROCEDURE — 93970 EXTREMITY STUDY: CPT | Mod: 26

## 2021-03-12 PROCEDURE — 99232 SBSQ HOSP IP/OBS MODERATE 35: CPT | Mod: GC

## 2021-03-12 RX ORDER — TRAMADOL HYDROCHLORIDE 50 MG/1
50 TABLET ORAL EVERY 4 HOURS
Refills: 0 | Status: DISCONTINUED | OUTPATIENT
Start: 2021-03-12 | End: 2021-03-17

## 2021-03-12 RX ORDER — TRAMADOL HYDROCHLORIDE 50 MG/1
25 TABLET ORAL EVERY 4 HOURS
Refills: 0 | Status: DISCONTINUED | OUTPATIENT
Start: 2021-03-12 | End: 2021-03-17

## 2021-03-12 RX ADMIN — AMLODIPINE BESYLATE 5 MILLIGRAM(S): 2.5 TABLET ORAL at 05:56

## 2021-03-12 RX ADMIN — LIDOCAINE 1 PATCH: 4 CREAM TOPICAL at 07:53

## 2021-03-12 RX ADMIN — Medication 500 MILLIGRAM(S): at 05:56

## 2021-03-12 RX ADMIN — LIDOCAINE 1 PATCH: 4 CREAM TOPICAL at 18:24

## 2021-03-12 RX ADMIN — Medication 500 MILLIGRAM(S): at 17:08

## 2021-03-12 RX ADMIN — LOSARTAN POTASSIUM 100 MILLIGRAM(S): 100 TABLET, FILM COATED ORAL at 05:57

## 2021-03-12 RX ADMIN — ATORVASTATIN CALCIUM 10 MILLIGRAM(S): 80 TABLET, FILM COATED ORAL at 21:13

## 2021-03-12 RX ADMIN — Medication 1 PATCH: at 19:30

## 2021-03-12 RX ADMIN — ATENOLOL 100 MILLIGRAM(S): 25 TABLET ORAL at 05:57

## 2021-03-12 RX ADMIN — LIDOCAINE 1 PATCH: 4 CREAM TOPICAL at 06:01

## 2021-03-12 RX ADMIN — Medication 1 PATCH: at 07:53

## 2021-03-12 RX ADMIN — PANTOPRAZOLE SODIUM 40 MILLIGRAM(S): 20 TABLET, DELAYED RELEASE ORAL at 06:01

## 2021-03-12 RX ADMIN — Medication 1 PATCH: at 11:03

## 2021-03-12 RX ADMIN — Medication 1 TABLET(S): at 11:02

## 2021-03-12 RX ADMIN — Medication 325 MILLIGRAM(S): at 05:56

## 2021-03-12 RX ADMIN — Medication 1 PATCH: at 11:02

## 2021-03-12 RX ADMIN — Medication 325 MILLIGRAM(S): at 17:08

## 2021-03-12 NOTE — PROGRESS NOTE ADULT - ASSESSMENT
80y with a history of HTN, CAD who presented to [facility] on 2/25 with complaint of right hip pain  and found to have right hip IT fracture. Hospital course complicated by postoperative blood loss anemia. Now admitted to [facility] after for initiation of a multidisciplinary rehab program consisting focused on functional mobility, transfers and ADLs (activities of daily living)- pt/ot/dvt ppx        Decreased GFR  -Losartan reduced to 50mg daily  -monitor GFR     HTN  -Losartan reduced to 50mg Daily   -Continue norvasc, and atenolol  -Monitor SBPs     CAD  -Continue ASA and statin     thrombocytopenia, Heparin-PF4 AB Interp: Positive,   - PLATELET Count improved  - stopped Lovenox 3/9/21  - asa 81 bid for dvt ppx    will follow  d/w dr. donnelly

## 2021-03-12 NOTE — PROGRESS NOTE ADULT - ATTENDING COMMENTS
80y with a history of HTN, CAD who presented to [facility] on 2/25 with complaint of right hip pain  and found to have right hip IT fracture. Hospital course complicated by postoperative blood loss anemia  Decreased GFR  -Losartan reduced to 50mg daily  -monitor GFR   over all doing well  will follow

## 2021-03-12 NOTE — PROGRESS NOTE ADULT - ASSESSMENT
SOY TOVAR is a 80y with a history of HTN, CAD who presented to [facility] on 2/25 with complaint of right hip pain  and found to have right hip IT fracture. Hospital course complicated by postoperative blood loss anemia. Now admitted to [facility] after for initiation of a multidisciplinary rehab program consisting focused on functional mobility, transfers and ADLs (activities of daily living).      Comprehensive Multidisciplinary Rehab Program:  - Continue comprehensive rehab program, 3 hours a day, 5 days a week.  - PT 2hr/day: Focused on improving strength, endurance, coordination, balance, functional mobility, and transfers  - OT 1hr/day: Focused on improving strength, fine motor skills, coordination, posture and ADLs.    - Activity Limitations: Decreased social, vocational and leisure activities, decreased self care and ADLs, decreased mobility, decreased ability to manage household and finances.     Participation Restrictions/Precautions:  - Weight bearing status: WBAT  - Precautions:    [x ] Falls   [ ] Spinal   [ ] Hip (Anterior or Posterior)       [ ] Seizure  [ ] Cardiac   [ ] Sternal    Rehab Diagnosis/Management:  Sleep:   - Maintain quiet hours and low stim environment.      Pain Management:  - LFTs better off tylenol, tramadol prn, ice packs, lidocaine patch     GI/Bowel:  - At risk for constipation due to neurologic diagnosis, immobility and/or medication use  - Senna QHS  - GI ppx: protonix     /Bladder:   - At risk for incontinence and retention due to neurologic diagnosis and limited mobility  - Currently patient voids:    [x ] independent     [ ] external collection device (condom cath)    [ ] Indwelling villegas catheter     [ ] Intermittent catheterization  - baseline PVR - 3cc  - Encourage timed voids every 4 hours while awake for independence and to promote continence during therapy.    Skin/Pressure Injury:   - At risk for pressure injury due to neurologic diagnosis and relative immobility.  - Skin assessment on admission admission: 2 small blisters inferior to incision   - Monitor Incisions: 2 hip incisions intact with glue  - Turn every 2 hours while in bed, air mattress  - Soft heel protectors  - Skin barrier cream as needed  - Nursing to monitor skin Qshift    Diet/Dysphagia:  - Diet Consistency/Modifications: Regular diet   - Supplements: Ensure TID       DVT ppx: lovenox   - SCDs  - Last Doppler on N/A    -------------  Comorbid Medical Management:    Elevated LFTs  -Stop tylenol for now  repeat LFTs better  -Monitor CMP      HTN  -Continue norvasc, losartan and atenolol  -Monitor SBPs     CAD  -Continue ASA and statin     OMARI  BUN 32 Cr1.1-->32/0.94--> 39/1.02-->34/1.06  encourage fluids  IV D5 given last night (Na improved)  CMP Monday     thrombocytosis  plts 100k 3/7 and 3/9, 122 3/10, 157 today   HIT antibody +   Stopped lovenox 3/9  Per ortho office, started ASA 325mg BID on 3/10  duplex to be ordered- If negative- continue ASA  If positive- Arixtra?      ---------------      Outpatient Follow-up (Specialty/Name of physician): Dr Lora (ortho)    --------------  Goals: Safe discharge to home  Estimated Length of Stay: 10-14 days  Rehab Potential: Good  Medical Prognosis: Good  Estimated Disposition: Home with Home Care  ---------------

## 2021-03-12 NOTE — PROGRESS NOTE ADULT - SUBJECTIVE AND OBJECTIVE BOX
80y with a history of HTN, CAD who presented to [facility] on 2/25 with complaint of right hip pain  and found to have right hip IT fracture  underwent right hip IMN on 2/27    Patient seen at the bedside, Endorses having frequent bowel movements; states BM is solid, brown, not watery, not diarrhea like. Denies pain, Endorses feeling overall well, doing well with PT. Tolerating PO intake well. Denies ABD pain, N/V/D, constipation, CP,  SOB      Vital Signs Last 24 Hrs  T(C): 36.9 (12 Mar 2021 08:34), Max: 36.9 (12 Mar 2021 08:34)  T(F): 98.5 (12 Mar 2021 08:34), Max: 98.5 (12 Mar 2021 08:34)  HR: 64 (12 Mar 2021 08:34) (64 - 83)  BP: 126/60 (12 Mar 2021 08:34) (125/66 - 128/65)  BP(mean): --  RR: 15 (12 Mar 2021 08:34) (15 - 15)  SpO2: 99% (12 Mar 2021 08:34) (97% - 99%)    GENERAL- NAD  EAR/NOSE/MOUTH/THROAT - no pharyngeal exudates, no oral lesions  MMM  EYES- TIFF, conjunctiva and Sclera clear  NECK- supple  RESPIRATORY-  clear to auscultation bilaterally  CARDIOVASCULAR - SIS2, RRR  GI - soft NT BS present  EXTREMITIES- no pedal edema  NEUROLOGY- no gross focal deficits  SKIN- no rashes, warm to touch, Right hip surgical incision approximated with no erythema or swelling  PSYCHIATRY- AAO X 3                   10.0                 143  | 28   | 34           8.34  >-----------< 157     ------------------------< 100                   32.3                 4.0  | 107  | 1.06                                         Ca 9.8   Mg x     Ph x        Heparin Induced Platelet Antibody (03.09.21 @ 14:10)    Heparin-PF4 AB Result: 4.4 u/mL    Heparin-PF4 AB Interp: Positive        MEDICATIONS  (STANDING):  amLODIPine   Tablet 5 milliGRAM(s) Oral daily  ascorbic acid 500 milliGRAM(s) Oral two times a day  aspirin enteric coated 325 milliGRAM(s) Oral two times a day  ATENolol  Tablet 100 milliGRAM(s) Oral daily  atorvastatin 10 milliGRAM(s) Oral at bedtime  lidocaine   Patch 1 Patch Transdermal <User Schedule>  losartan 50 milliGRAM(s) Oral daily  multivitamin 1 Tablet(s) Oral daily  nicotine -   7 mG/24Hr(s) Patch 1 patch Transdermal daily  pantoprazole    Tablet 40 milliGRAM(s) Oral before breakfast    MEDICATIONS  (PRN):  polyethylene glycol 3350 17 Gram(s) Oral daily PRN Constipation  traMADol 50 milliGRAM(s) Oral every 4 hours PRN Severe Pain (7 - 10)  traMADol 25 milliGRAM(s) Oral every 4 hours PRN Moderate Pain (4 - 6)

## 2021-03-12 NOTE — PROGRESS NOTE ADULT - SUBJECTIVE AND OBJECTIVE BOX
This is a 80 female with traumatic fall down 2 stairs at home. Landed on her right side and denies head trauma and LOC. Patient reports she was walking down her stairs and her shoes slipped causing her to spin over and fall. Denies hitting her head. Was unable to ambulate after the fall and thus son brought her to [facility]'s ER on 2/25. Pain reported at the right hip when she moved her leg, otherwise no pain anywhere else.   Patient found to have right IT fracture to right hip with comminution of the lesser trochanteric area.   Patient cleared for the OR and underwent right hip IMN on 2/27 by Dr Lora.  Post operatively, acute blood loss anemia noted.   Patient seen by OT, PT and PM&R. Recommendations made for acute IPR.   Patient deemed medically stable for dc to [facility]'s acute IPR on 3/4/2021.       Incidentally: Patient noted to have a 1.1 x 1.0 cm nodule in the right lower lobe medially adjacent to the IVC found on CT of abdomen and chest prior to surgery. Report reads: "While this may be benign, malignancy cannot be excluded. Further evaluation is necessary. If no old films are available for comparison, noncontrast chest CT is recommended to assess for additional nodules."       ROS:   No further dizziness.   doing well   last BM today 3/12  voiding spontaneously  pain controlled with tylenol  no nausea no vomiting  no chest pain  no  headaches  patient against further IV Fluids- will take in more PO and check labs on Monday  If Needed, Agrees to IV fluid on Monday  spoke with ortho- to obtain LE duplex- if negative Continue ASA- If positive for DVT- change to arixtra               MEDICATIONS  (STANDING):  amLODIPine   Tablet 5 milliGRAM(s) Oral daily  ascorbic acid 500 milliGRAM(s) Oral two times a day  aspirin enteric coated 325 milliGRAM(s) Oral two times a day  ATENolol  Tablet 100 milliGRAM(s) Oral daily  atorvastatin 10 milliGRAM(s) Oral at bedtime  lidocaine   Patch 1 Patch Transdermal <User Schedule>  losartan 100 milliGRAM(s) Oral daily  multivitamin 1 Tablet(s) Oral daily  nicotine -   7 mG/24Hr(s) Patch 1 patch Transdermal daily  pantoprazole    Tablet 40 milliGRAM(s) Oral before breakfast    MEDICATIONS  (PRN):  polyethylene glycol 3350 17 Gram(s) Oral daily PRN Constipation  traMADol 50 milliGRAM(s) Oral every 4 hours PRN Severe Pain (7 - 10)  traMADol 25 milliGRAM(s) Oral every 4 hours PRN Moderate Pain (4 - 6)                            10.0   8.34  )-----------( 157      ( 11 Mar 2021 05:30 )             32.3     03-11    143  |  107  |  34<H>  ----------------------------<  100<H>  4.0   |  28  |  1.06    Ca    9.8      11 Mar 2021 05:30    TPro  7.2  /  Alb  3.3  /  TBili  0.4  /  DBili  x   /  AST  42<H>  /  ALT  58<H>  /  AlkPhos  129<H>  03-11        CAPILLARY BLOOD GLUCOSE          Vital Signs Last 24 Hrs  T(C): 36.9 (12 Mar 2021 08:34), Max: 36.9 (12 Mar 2021 08:34)  T(F): 98.5 (12 Mar 2021 08:34), Max: 98.5 (12 Mar 2021 08:34)  HR: 64 (12 Mar 2021 08:34) (64 - 83)  BP: 126/60 (12 Mar 2021 08:34) (125/66 - 128/65)  BP(mean): --  RR: 15 (12 Mar 2021 08:34) (15 - 15)  SpO2: 99% (12 Mar 2021 08:34) (97% - 99%)      Physical Exam:   General: NAD, Resting Comfortable,                                  HEENT: NC/AT, EOM I, PERRLA, Normal Conjunctivae  Cardio: RRR, Normal S1-S2, No M/G/R                              Pulm: No Respiratory Distress,  Lungs CTAB                        Abdomen: ND/NT, Soft, BS+                                                MSK: No joint swelling, Full ROM                                         Ext: No C/C/E, No calf tenderness    Skin:  right hip incision with glue 2 incisions intact- blisters gone                                                           Wounds: none  Decubitus Ulcers: None Present     Neurological Examination    Cognitive: AAO x 3                                                                         Attention: Intact   Judgment: Good evidence of judgement                               Memory: Recall 3 objects immediate and 3 min later     Mood/Affect: wnl                                                                           Communication:  Fluent,  No dysarthria   Swallow: intact  CN II - XII  intact  Coordination: FTN/HTS intact                                                                              Sensory: Intact to light touch, PP and Vibration                                                                                             Tone: normal Throughout   Balance: impaired    Motor    LEFT    UE: SF [5/5], EF [5/5], EE [5/5], WE [5/5],  [wnl]  RIGHT UE: SF [5/5], EF [5/5], EE [5/5], WE [5/5],  [wnl]  LEFT    LE:  HF [5/5], KE [5/5], DF [5/5], EHL [5/5],  PF [5/5]  RIGHT LE:  HF [2/5], KE [4/5], DF [5/5], EHL [5/5],  PF [5/5]      Reflex:  2 + throughout Guardado/Babinski negative      IDT meeting 3/8      OT: min A LBD   cs toileting, commode transfer   min A tub transfer       PT: CS to CG   min A to move leg in and   CS 80ft   steps with 2 rails min A     Tentative dc home on 3/17 with home care services.

## 2021-03-13 PROCEDURE — 99232 SBSQ HOSP IP/OBS MODERATE 35: CPT

## 2021-03-13 PROCEDURE — 99232 SBSQ HOSP IP/OBS MODERATE 35: CPT | Mod: GC

## 2021-03-13 RX ORDER — LOSARTAN POTASSIUM 100 MG/1
50 TABLET, FILM COATED ORAL
Refills: 0 | Status: DISCONTINUED | OUTPATIENT
Start: 2021-03-13 | End: 2021-03-17

## 2021-03-13 RX ADMIN — Medication 500 MILLIGRAM(S): at 05:58

## 2021-03-13 RX ADMIN — Medication 325 MILLIGRAM(S): at 17:07

## 2021-03-13 RX ADMIN — Medication 1 PATCH: at 11:40

## 2021-03-13 RX ADMIN — LOSARTAN POTASSIUM 50 MILLIGRAM(S): 100 TABLET, FILM COATED ORAL at 10:50

## 2021-03-13 RX ADMIN — Medication 1 PATCH: at 07:00

## 2021-03-13 RX ADMIN — LIDOCAINE 1 PATCH: 4 CREAM TOPICAL at 07:05

## 2021-03-13 RX ADMIN — LIDOCAINE 1 PATCH: 4 CREAM TOPICAL at 06:02

## 2021-03-13 RX ADMIN — PANTOPRAZOLE SODIUM 40 MILLIGRAM(S): 20 TABLET, DELAYED RELEASE ORAL at 06:02

## 2021-03-13 RX ADMIN — Medication 1 TABLET(S): at 11:40

## 2021-03-13 RX ADMIN — ATORVASTATIN CALCIUM 10 MILLIGRAM(S): 80 TABLET, FILM COATED ORAL at 22:24

## 2021-03-13 RX ADMIN — Medication 500 MILLIGRAM(S): at 17:07

## 2021-03-13 RX ADMIN — Medication 1 PATCH: at 19:57

## 2021-03-13 RX ADMIN — Medication 1 PATCH: at 11:39

## 2021-03-13 RX ADMIN — LIDOCAINE 1 PATCH: 4 CREAM TOPICAL at 19:56

## 2021-03-13 RX ADMIN — ATENOLOL 100 MILLIGRAM(S): 25 TABLET ORAL at 05:58

## 2021-03-13 RX ADMIN — AMLODIPINE BESYLATE 5 MILLIGRAM(S): 2.5 TABLET ORAL at 05:58

## 2021-03-13 RX ADMIN — Medication 325 MILLIGRAM(S): at 05:58

## 2021-03-13 RX ADMIN — LOSARTAN POTASSIUM 100 MILLIGRAM(S): 100 TABLET, FILM COATED ORAL at 05:58

## 2021-03-13 NOTE — PROGRESS NOTE ADULT - ATTENDING COMMENTS
80y with a history of HTN, CAD who presented to [facility] on 2/25 with complaint of right hip pain  and found to have right hip IT fracture. Hospital course complicated by postoperative blood loss anemia  Decreased GFR  -Losartan reduced to 50mg daily  -monitor GFR   over all doing well  will follow 80y with a history of HTN, CAD who presented to [facility] on 2/25 with complaint of right hip pain  and found to have right hip IT fracture. Hospital course complicated by postoperative blood loss anemia.    seen at the bedside,   c/o mild pain in the right hip  overall stable  HIT +, Lovenox was d.c's  platelets improved  gfr declined- decreased dose of losartan

## 2021-03-13 NOTE — PROGRESS NOTE ADULT - ASSESSMENT
80y with a history of HTN, CAD who presented to [facility] on 2/25 with complaint of right hip pain  and found to have right hip IT fracture. Hospital course complicated by postoperative blood loss anemia. Now admitted to [facility] after for initiation of a multidisciplinary rehab program consisting focused on functional mobility, transfers and ADLs (activities of daily living)- pt/ot/dvt ppx        Decreased GFR  -Losartan  50mg   -monitor GFR     HTN  -Losartan 50mg    -Continue norvasc, and atenolol  -Monitor SBPs     CAD  -Continue ASA and statin     thrombocytopenia, Heparin-PF4 AB Interp: Positive,   - PLATELET Count improved  - stopped Lovenox 3/9/21  - asa 81 bid for dvt ppx    will follow  d/w dr. donnelly 80y with a history of HTN, CAD who presented to [facility] on 2/25 with complaint of right hip pain  and found to have right hip IT fracture. Hospital course complicated by postoperative blood loss anemia. Now admitted to [facility] after for initiation of a multidisciplinary rehab program consisting focused on functional mobility, transfers and ADLs (activities of daily living)- pt/ot/dvt ppx        Decreased GFR  -Losartan  50mg   -monitor GFR     HTN  -Losartan 50mg    -Continue norvasc, and atenolol  -Monitor SBPs     CAD  -Continue ASA and statin     thrombocytopenia, Heparin-PF4 AB Interp: Positive,   - PLATELET Count improved  - stopped Lovenox 3/9/21  - asa 81 bid for dvt ppx    will follow  d/w Rehab team.  80y with a history of HTN, CAD who presented to [facility] on 2/25 with complaint of right hip pain  and found to have right hip IT fracture. Hospital course complicated by postoperative blood loss anemia. Now admitted to [facility] after for initiation of a multidisciplinary rehab program consisting focused on functional mobility, transfers and ADLs (activities of daily living)- pt/ot/dvt ppx      Decreased GFR  -Losartan  50mg   -monitor GFR     HTN  -Losartan 50mg    -Continue norvasc, and atenolol  -Monitor SBPs     CAD  -Continue ASA and statin     thrombocytopenia, Heparin-PF4 AB Interp: Positive,   - PLATELET Count improved  - stopped Lovenox 3/9/21  - asa 81 bid for dvt ppx    will follow  d/w Rehab team.

## 2021-03-13 NOTE — PROGRESS NOTE ADULT - SUBJECTIVE AND OBJECTIVE BOX
Cc:   Gait dysfunction    HPI: Patient with no new medical issues today.  Pain controlled, no chest pain, no N/V, no Fevers/Chills. No other new ROS  Has been tolerating rehabilitation program.    amLODIPine   Tablet 5 milliGRAM(s) Oral daily  ascorbic acid 500 milliGRAM(s) Oral two times a day  aspirin enteric coated 325 milliGRAM(s) Oral two times a day  ATENolol  Tablet 100 milliGRAM(s) Oral daily  atorvastatin 10 milliGRAM(s) Oral at bedtime  lidocaine   Patch 1 Patch Transdermal <User Schedule>  losartan 100 milliGRAM(s) Oral daily  multivitamin 1 Tablet(s) Oral daily  nicotine -   7 mG/24Hr(s) Patch 1 patch Transdermal daily  pantoprazole    Tablet 40 milliGRAM(s) Oral before breakfast  polyethylene glycol 3350 17 Gram(s) Oral daily PRN  traMADol 50 milliGRAM(s) Oral every 4 hours PRN  traMADol 25 milliGRAM(s) Oral every 4 hours PRN      T(C): 36.7 (03-12-21 @ 21:00), Max: 36.9 (03-12-21 @ 08:34)  HR: 70 (03-13-21 @ 05:50) (64 - 73)  BP: 166/74 (03-13-21 @ 05:50) (126/60 - 166/74)  RR: 15 (03-13-21 @ 05:50) (15 - 15)  SpO2: 97% (03-13-21 @ 05:50) (97% - 99%)    In NAD  HEENT- EOMI  Heart- Well Perfused  Lungs- No resp distress, no use of accessory resp muscles  Abd- + BS, NT  Ext- No calf pain  Neuro- Exam unchanged  Psych- Affect wnl    < from: US Duplex Venous Lower Ext Complete, Bilateral (03.12.21 @ 20:23) >  No evidence of deepvenous thrombosis in either lower extremity.    Imp: Patient with diagnosis of hip fx  admitted for comprehensive acute rehabilitation.    Plan:  - Continue therapies  - DVT prophylaxis- On ASA  - Skin- Turn q2h, check skin daily  - Continue current medications; patient medically stable.   - Patient is stable to continue current rehabilitation program.

## 2021-03-13 NOTE — PROGRESS NOTE ADULT - SUBJECTIVE AND OBJECTIVE BOX
Order placed   80y with a history of HTN, CAD who presented to [facility] on 2/25 with complaint of right hip pain  and found to have right hip IT fracture  underwent right hip IMN on 2/27    Patient seen at the bedside, C/O right hip pain overnight, currently rates 2/10, pain improving with lidocaine patch applied, worsens when laying on hip or weight bearing. Endorses feeling overall well, doing well with PT. Tolerating PO intake well. Denies ABD pain, N/V/D, constipation, CP,  SOB      Vital Signs Last 24 Hrs  T(C): 36.7 (12 Mar 2021 21:00), Max: 36.9 (12 Mar 2021 08:34)  T(F): 98.1 (12 Mar 2021 21:00), Max: 98.5 (12 Mar 2021 08:34)  HR: 70 (13 Mar 2021 05:50) (64 - 73)  BP: 166/74 (13 Mar 2021 05:50) (126/60 - 166/74)  BP(mean): --  RR: 15 (13 Mar 2021 05:50) (15 - 15)  SpO2: 97% (13 Mar 2021 05:50) (97% - 99%)      GENERAL- NAD  EAR/NOSE/MOUTH/THROAT - no pharyngeal exudates, no oral lesions  MMM  EYES- TIFF, conjunctiva and Sclera clear  NECK- supple  RESPIRATORY-  clear to auscultation bilaterally  CARDIOVASCULAR - SIS2, RRR  GI - soft NT BS present  EXTREMITIES- no pedal edema  NEUROLOGY- no gross focal deficits  SKIN- no rashes, warm to touch, Right hip surgical incision approximated with no erythema or swelling, lidocaine patch to right hip.   PSYCHIATRY- AAO X 3      (03.11.21 @ 0530)               10.0                 143  | 28   | 34           8.34  >-----------< 157     ------------------------< 100                   32.3                 4.0  | 107  | 1.06                                         Ca 9.8   Mg x     Ph x        Heparin Induced Platelet Antibody (03.09.21 @ 14:10)    Heparin-PF4 AB Result: 4.4 u/mL    Heparin-PF4 AB Interp: Positive        MEDICATIONS  (STANDING):  amLODIPine   Tablet 5 milliGRAM(s) Oral daily  ascorbic acid 500 milliGRAM(s) Oral two times a day  aspirin enteric coated 325 milliGRAM(s) Oral two times a day  ATENolol  Tablet 100 milliGRAM(s) Oral daily  atorvastatin 10 milliGRAM(s) Oral at bedtime  lidocaine   Patch 1 Patch Transdermal <User Schedule>  losartan 50 milliGRAM(s) Oral daily  multivitamin 1 Tablet(s) Oral daily  nicotine -   7 mG/24Hr(s) Patch 1 patch Transdermal daily  pantoprazole    Tablet 40 milliGRAM(s) Oral before breakfast    MEDICATIONS  (PRN):  polyethylene glycol 3350 17 Gram(s) Oral daily PRN Constipation  traMADol 50 milliGRAM(s) Oral every 4 hours PRN Severe Pain (7 - 10)  traMADol 25 milliGRAM(s) Oral every 4 hours PRN Moderate Pain (4 - 6)

## 2021-03-14 PROCEDURE — 99232 SBSQ HOSP IP/OBS MODERATE 35: CPT

## 2021-03-14 RX ADMIN — Medication 325 MILLIGRAM(S): at 17:11

## 2021-03-14 RX ADMIN — Medication 325 MILLIGRAM(S): at 06:41

## 2021-03-14 RX ADMIN — LIDOCAINE 1 PATCH: 4 CREAM TOPICAL at 17:12

## 2021-03-14 RX ADMIN — PANTOPRAZOLE SODIUM 40 MILLIGRAM(S): 20 TABLET, DELAYED RELEASE ORAL at 06:41

## 2021-03-14 RX ADMIN — Medication 500 MILLIGRAM(S): at 17:10

## 2021-03-14 RX ADMIN — ATENOLOL 100 MILLIGRAM(S): 25 TABLET ORAL at 06:41

## 2021-03-14 RX ADMIN — Medication 1 PATCH: at 12:17

## 2021-03-14 RX ADMIN — LOSARTAN POTASSIUM 50 MILLIGRAM(S): 100 TABLET, FILM COATED ORAL at 08:35

## 2021-03-14 RX ADMIN — TRAMADOL HYDROCHLORIDE 50 MILLIGRAM(S): 50 TABLET ORAL at 21:40

## 2021-03-14 RX ADMIN — Medication 1 PATCH: at 17:12

## 2021-03-14 RX ADMIN — Medication 1 PATCH: at 12:19

## 2021-03-14 RX ADMIN — ATORVASTATIN CALCIUM 10 MILLIGRAM(S): 80 TABLET, FILM COATED ORAL at 21:36

## 2021-03-14 RX ADMIN — TRAMADOL HYDROCHLORIDE 50 MILLIGRAM(S): 50 TABLET ORAL at 22:30

## 2021-03-14 RX ADMIN — Medication 1 PATCH: at 06:44

## 2021-03-14 RX ADMIN — LIDOCAINE 1 PATCH: 4 CREAM TOPICAL at 06:41

## 2021-03-14 RX ADMIN — Medication 1 TABLET(S): at 12:18

## 2021-03-14 RX ADMIN — LIDOCAINE 1 PATCH: 4 CREAM TOPICAL at 06:44

## 2021-03-14 RX ADMIN — Medication 500 MILLIGRAM(S): at 06:41

## 2021-03-14 RX ADMIN — AMLODIPINE BESYLATE 5 MILLIGRAM(S): 2.5 TABLET ORAL at 06:41

## 2021-03-14 RX ADMIN — LIDOCAINE 1 PATCH: 4 CREAM TOPICAL at 19:53

## 2021-03-14 NOTE — PROGRESS NOTE ADULT - SUBJECTIVE AND OBJECTIVE BOX
80y with a history of HTN, CAD who presented to [facility] on 2/25 with complaint of right hip pain  and found to have right hip IT fracture  underwent right hip IMN on 2/27    Patient seen at the bedside, no new complaints this am,  Denies ABD pain, N/V/D, constipation, CP,  SOB      Vital Signs Last 24 Hrs  T(C): 36.4 (14 Mar 2021 09:22), Max: 36.8 (13 Mar 2021 20:16)  T(F): 97.5 (14 Mar 2021 09:22), Max: 98.2 (13 Mar 2021 20:16)  HR: 67 (14 Mar 2021 09:22) (62 - 78)  BP: 127/63 (14 Mar 2021 09:22) (127/63 - 144/61)  BP(mean): --  RR: 15 (14 Mar 2021 09:22) (15 - 15)  SpO2: 97% (14 Mar 2021 09:22) (95% - 98%)      GENERAL- NAD  EAR/NOSE/MOUTH/THROAT - no pharyngeal exudates, no oral lesions  MMM  EYES- TIFF, conjunctiva and Sclera clear  NECK- supple  RESPIRATORY-  clear to auscultation bilaterally  CARDIOVASCULAR - SIS2, RRR  GI - soft NT BS present  EXTREMITIES- no pedal edema  NEUROLOGY- no gross focal deficits  SKIN- no rashes, warm to touch, Right hip surgical incision approximated with no erythema or swelling, lidocaine patch to right hip.   PSYCHIATRY- AAO X 3    MEDICATIONS  (STANDING):  amLODIPine   Tablet 5 milliGRAM(s) Oral daily  ascorbic acid 500 milliGRAM(s) Oral two times a day  aspirin enteric coated 325 milliGRAM(s) Oral two times a day  ATENolol  Tablet 100 milliGRAM(s) Oral daily  atorvastatin 10 milliGRAM(s) Oral at bedtime  lidocaine   Patch 1 Patch Transdermal <User Schedule>  losartan 50 milliGRAM(s) Oral <User Schedule>  multivitamin 1 Tablet(s) Oral daily  nicotine -   7 mG/24Hr(s) Patch 1 patch Transdermal daily  pantoprazole    Tablet 40 milliGRAM(s) Oral before breakfast    MEDICATIONS  (PRN):  polyethylene glycol 3350 17 Gram(s) Oral daily PRN Constipation  traMADol 50 milliGRAM(s) Oral every 4 hours PRN Severe Pain (7 - 10)  traMADol 25 milliGRAM(s) Oral every 4 hours PRN Moderate Pain (4 - 6)

## 2021-03-14 NOTE — PROGRESS NOTE ADULT - SUBJECTIVE AND OBJECTIVE BOX
Cc: Gait dysfunction    HPI:   Appreciate Hospitalist.   Patient with no new medical issues today.  Pain controlled, no chest pain, no N/V, no Fevers/Chills. No other new ROS  Has been tolerating rehabilitation program.    MEDICATIONS  (STANDING):  amLODIPine   Tablet 5 milliGRAM(s) Oral daily  ascorbic acid 500 milliGRAM(s) Oral two times a day  aspirin enteric coated 325 milliGRAM(s) Oral two times a day  ATENolol  Tablet 100 milliGRAM(s) Oral daily  atorvastatin 10 milliGRAM(s) Oral at bedtime  lidocaine   Patch 1 Patch Transdermal <User Schedule>  losartan 50 milliGRAM(s) Oral <User Schedule>  multivitamin 1 Tablet(s) Oral daily  nicotine -   7 mG/24Hr(s) Patch 1 patch Transdermal daily  pantoprazole    Tablet 40 milliGRAM(s) Oral before breakfast    MEDICATIONS  (PRN):  polyethylene glycol 3350 17 Gram(s) Oral daily PRN Constipation  traMADol 50 milliGRAM(s) Oral every 4 hours PRN Severe Pain (7 - 10)  traMADol 25 milliGRAM(s) Oral every 4 hours PRN Moderate Pain (4 - 6)    Vital Signs Last 24 Hrs  T(C): 36.8 (13 Mar 2021 20:16), Max: 36.8 (13 Mar 2021 20:16)  T(F): 98.2 (13 Mar 2021 20:16), Max: 98.2 (13 Mar 2021 20:16)  HR: 62 (14 Mar 2021 06:44) (62 - 78)  BP: 144/61 (14 Mar 2021 06:44) (129/66 - 144/61)  BP(mean): --  RR: 15 (14 Mar 2021 06:44) (15 - 15)  SpO2: 98% (14 Mar 2021 06:44) (95% - 98%)    In NAD  HEENT- EOMI  Heart- Well Perfused  Lungs- No resp distress, no use of accessory resp muscles  Abd- + BS, NT  Ext- No calf pain  Neuro- Exam unchanged  Psych- Affect wnl    Imp: Patient with diagnosis of hip fx  admitted for comprehensive acute rehabilitation.    Plan:  - Continue therapies  - DVT prophylaxis- On ASA  - Skin- Turn q2h, check skin daily  - Continue current medications; patient medically stable.   - Patient is stable to continue current rehabilitation program.

## 2021-03-14 NOTE — PROGRESS NOTE ADULT - ASSESSMENT
80y with a history of HTN, CAD who presented to [facility] on 2/25 with complaint of right hip pain  and found to have right hip IT fracture. Hospital course complicated by postoperative blood loss anemia. Now admitted to [facility] after for initiation of a multidisciplinary rehab program consisting focused on functional mobility, transfers and ADLs (activities of daily living)- pt/ot/dvt ppx      Decreased GFR  -Losartan  50mg   -monitor GFR     HTN  -Losartan 50mg    -Continue norvasc, and atenolol  -Monitor SBPs     CAD  -Continue ASA and statin     thrombocytopenia, Heparin-PF4 AB Interp: Positive,   - PLATELET Count improved  - stopped Lovenox 3/9/21  - asa 81 bid for dvt ppx    will follow  d/w Rehab team.

## 2021-03-15 ENCOUNTER — TRANSCRIPTION ENCOUNTER (OUTPATIENT)
Age: 81
End: 2021-03-15

## 2021-03-15 LAB
ALBUMIN SERPL ELPH-MCNC: 3.1 G/DL — LOW (ref 3.3–5)
ALP SERPL-CCNC: 129 U/L — HIGH (ref 40–120)
ALT FLD-CCNC: 29 U/L — SIGNIFICANT CHANGE UP (ref 10–45)
ANION GAP SERPL CALC-SCNC: 7 MMOL/L — SIGNIFICANT CHANGE UP (ref 5–17)
AST SERPL-CCNC: 19 U/L — SIGNIFICANT CHANGE UP (ref 10–40)
BILIRUB SERPL-MCNC: 0.3 MG/DL — SIGNIFICANT CHANGE UP (ref 0.2–1.2)
BUN SERPL-MCNC: 28 MG/DL — HIGH (ref 7–23)
CALCIUM SERPL-MCNC: 9.8 MG/DL — SIGNIFICANT CHANGE UP (ref 8.4–10.5)
CHLORIDE SERPL-SCNC: 108 MMOL/L — SIGNIFICANT CHANGE UP (ref 96–108)
CO2 SERPL-SCNC: 27 MMOL/L — SIGNIFICANT CHANGE UP (ref 22–31)
CREAT SERPL-MCNC: 0.97 MG/DL — SIGNIFICANT CHANGE UP (ref 0.5–1.3)
GLUCOSE SERPL-MCNC: 89 MG/DL — SIGNIFICANT CHANGE UP (ref 70–99)
HCT VFR BLD CALC: 32.3 % — LOW (ref 34.5–45)
HGB BLD-MCNC: 10.1 G/DL — LOW (ref 11.5–15.5)
MCHC RBC-ENTMCNC: 30 PG — SIGNIFICANT CHANGE UP (ref 27–34)
MCHC RBC-ENTMCNC: 31.3 GM/DL — LOW (ref 32–36)
MCV RBC AUTO: 95.8 FL — SIGNIFICANT CHANGE UP (ref 80–100)
NRBC # BLD: 0 /100 WBCS — SIGNIFICANT CHANGE UP (ref 0–0)
PLATELET # BLD AUTO: 301 K/UL — SIGNIFICANT CHANGE UP (ref 150–400)
POTASSIUM SERPL-MCNC: 4.9 MMOL/L — SIGNIFICANT CHANGE UP (ref 3.5–5.3)
POTASSIUM SERPL-SCNC: 4.9 MMOL/L — SIGNIFICANT CHANGE UP (ref 3.5–5.3)
PROT SERPL-MCNC: 6.8 G/DL — SIGNIFICANT CHANGE UP (ref 6–8.3)
RBC # BLD: 3.37 M/UL — LOW (ref 3.8–5.2)
RBC # FLD: 14.2 % — SIGNIFICANT CHANGE UP (ref 10.3–14.5)
SODIUM SERPL-SCNC: 142 MMOL/L — SIGNIFICANT CHANGE UP (ref 135–145)
WBC # BLD: 8 K/UL — SIGNIFICANT CHANGE UP (ref 3.8–10.5)
WBC # FLD AUTO: 8 K/UL — SIGNIFICANT CHANGE UP (ref 3.8–10.5)

## 2021-03-15 PROCEDURE — 73502 X-RAY EXAM HIP UNI 2-3 VIEWS: CPT | Mod: 26,RT

## 2021-03-15 PROCEDURE — 99232 SBSQ HOSP IP/OBS MODERATE 35: CPT

## 2021-03-15 PROCEDURE — 99232 SBSQ HOSP IP/OBS MODERATE 35: CPT | Mod: GC

## 2021-03-15 RX ORDER — AMLODIPINE BESYLATE 2.5 MG/1
1 TABLET ORAL
Qty: 0 | Refills: 0 | DISCHARGE
Start: 2021-03-15

## 2021-03-15 RX ORDER — ASPIRIN/CALCIUM CARB/MAGNESIUM 324 MG
1 TABLET ORAL
Qty: 0 | Refills: 0 | DISCHARGE

## 2021-03-15 RX ORDER — ATENOLOL 25 MG/1
1 TABLET ORAL
Qty: 0 | Refills: 0 | DISCHARGE
Start: 2021-03-15

## 2021-03-15 RX ORDER — LOSARTAN POTASSIUM 100 MG/1
1 TABLET, FILM COATED ORAL
Qty: 0 | Refills: 0 | DISCHARGE

## 2021-03-15 RX ORDER — NICOTINE POLACRILEX 2 MG
7 GUM BUCCAL
Qty: 0 | Refills: 0 | DISCHARGE
Start: 2021-03-15

## 2021-03-15 RX ORDER — LOSARTAN POTASSIUM 100 MG/1
1 TABLET, FILM COATED ORAL
Qty: 0 | Refills: 0 | DISCHARGE
Start: 2021-03-15

## 2021-03-15 RX ORDER — ASPIRIN/CALCIUM CARB/MAGNESIUM 324 MG
1 TABLET ORAL
Qty: 0 | Refills: 0 | DISCHARGE
Start: 2021-03-15

## 2021-03-15 RX ORDER — PANTOPRAZOLE SODIUM 20 MG/1
1 TABLET, DELAYED RELEASE ORAL
Qty: 0 | Refills: 0 | DISCHARGE
Start: 2021-03-15

## 2021-03-15 RX ADMIN — AMLODIPINE BESYLATE 5 MILLIGRAM(S): 2.5 TABLET ORAL at 06:06

## 2021-03-15 RX ADMIN — Medication 500 MILLIGRAM(S): at 06:06

## 2021-03-15 RX ADMIN — PANTOPRAZOLE SODIUM 40 MILLIGRAM(S): 20 TABLET, DELAYED RELEASE ORAL at 06:07

## 2021-03-15 RX ADMIN — Medication 325 MILLIGRAM(S): at 17:23

## 2021-03-15 RX ADMIN — Medication 1 PATCH: at 19:48

## 2021-03-15 RX ADMIN — Medication 1 PATCH: at 12:00

## 2021-03-15 RX ADMIN — Medication 500 MILLIGRAM(S): at 17:23

## 2021-03-15 RX ADMIN — ATENOLOL 100 MILLIGRAM(S): 25 TABLET ORAL at 06:07

## 2021-03-15 RX ADMIN — Medication 1 PATCH: at 11:55

## 2021-03-15 RX ADMIN — Medication 1 PATCH: at 06:12

## 2021-03-15 RX ADMIN — LIDOCAINE 1 PATCH: 4 CREAM TOPICAL at 19:48

## 2021-03-15 RX ADMIN — LIDOCAINE 1 PATCH: 4 CREAM TOPICAL at 21:28

## 2021-03-15 RX ADMIN — LIDOCAINE 1 PATCH: 4 CREAM TOPICAL at 08:08

## 2021-03-15 RX ADMIN — ATORVASTATIN CALCIUM 10 MILLIGRAM(S): 80 TABLET, FILM COATED ORAL at 21:03

## 2021-03-15 RX ADMIN — LOSARTAN POTASSIUM 50 MILLIGRAM(S): 100 TABLET, FILM COATED ORAL at 08:08

## 2021-03-15 RX ADMIN — Medication 325 MILLIGRAM(S): at 06:06

## 2021-03-15 RX ADMIN — Medication 1 TABLET(S): at 11:55

## 2021-03-15 NOTE — DISCHARGE NOTE PROVIDER - DETAILS OF MALNUTRITION DIAGNOSIS/DIAGNOSES
This patient has been assessed with a concern for Malnutrition and was treated during this hospitalization for the following Nutrition diagnosis/diagnoses:     -  03/05/2021: Moderate protein-calorie malnutrition   This patient has been assessed with a concern for Malnutrition and was treated during this hospitalization for the following Nutrition diagnosis/diagnoses:     -  03/05/2021: Moderate protein-calorie malnutrition    This patient has been assessed with a concern for Malnutrition and was treated during this hospitalization for the following Nutrition diagnosis/diagnoses:     -  03/05/2021: Moderate protein-calorie malnutrition

## 2021-03-15 NOTE — PROGRESS NOTE ADULT - SUBJECTIVE AND OBJECTIVE BOX
80y with a history of HTN, CAD who presented to [facility] on 2/25 with complaint of right hip pain  and found to have right hip IT fracture  underwent right hip IMN on 2/27    Patient seen at the bedside, in v good spirits, sitting in the chair, no new complaints this am,  Denies ABD pain, N/V/D, constipation, CP,  SOB    Vital Signs Last 24 Hrs  T(C): 36.3 (15 Mar 2021 07:27), Max: 36.3 (15 Mar 2021 07:27)  T(F): 97.4 (15 Mar 2021 07:27), Max: 97.4 (15 Mar 2021 07:27)  HR: 61 (15 Mar 2021 07:27) (61 - 65)  BP: 118/61 (15 Mar 2021 07:27) (118/61 - 150/68)  BP(mean): --  RR: 16 (15 Mar 2021 07:27) (15 - 16)  SpO2: 97% (15 Mar 2021 07:27) (96% - 97%)      GENERAL- NAD  EAR/NOSE/MOUTH/THROAT - no pharyngeal exudates, no oral lesions  MMM  EYES- TIFF, conjunctiva and Sclera clear  NECK- supple  RESPIRATORY-  clear to auscultation bilaterally  CARDIOVASCULAR - SIS2, RRR  GI - soft NT BS present  EXTREMITIES- no pedal edema  NEUROLOGY- no gross focal deficits  PSYCHIATRY- AAO X 3    MEDICATIONS  (STANDING):  amLODIPine   Tablet 5 milliGRAM(s) Oral daily  ascorbic acid 500 milliGRAM(s) Oral two times a day  aspirin enteric coated 325 milliGRAM(s) Oral two times a day  ATENolol  Tablet 100 milliGRAM(s) Oral daily  atorvastatin 10 milliGRAM(s) Oral at bedtime  lidocaine   Patch 1 Patch Transdermal <User Schedule>  losartan 50 milliGRAM(s) Oral <User Schedule>  multivitamin 1 Tablet(s) Oral daily  nicotine -   7 mG/24Hr(s) Patch 1 patch Transdermal daily  pantoprazole    Tablet 40 milliGRAM(s) Oral before breakfast    MEDICATIONS  (PRN):  polyethylene glycol 3350 17 Gram(s) Oral daily PRN Constipation  traMADol 50 milliGRAM(s) Oral every 4 hours PRN Severe Pain (7 - 10)  traMADol 25 milliGRAM(s) Oral every 4 hours PRN Moderate Pain (4 - 6)

## 2021-03-15 NOTE — DISCHARGE NOTE PROVIDER - HOSPITAL COURSE
This is a 80 female with traumatic fall down 2 stairs at home. Landed on her right side and denies head trauma and LOC. Patient reports she was walking down her stairs and her shoes slipped causing her to spin over and fall. Denies hitting her head. Was unable to ambulate after the fall and thus son brought her to [facility]'s ER on 2/25. Pain reported at the right hip when she moved her leg, otherwise no pain anywhere else.   Patient found to have right IT fracture to right hip with comminution of the lesser trochanteric area.   Patient cleared for the OR and underwent right hip IMN on 2/27 by Dr Lora.  Post operatively, acute blood loss anemia noted.   Patient seen by OT, PT and PM&R. Recommendations made for acute IPR.   Patient deemed medically stable for dc to [facility]'s acute IPR on 3/4/2021.       Incidentally: Patient noted to have a 1.1 x 1.0 cm nodule in the right lower lobe medially adjacent to the IVC found on CT of abdomen and chest prior to surgery. Report reads: "While this may be benign, malignancy cannot be excluded. Further evaluation is necessary. If no old films are available for comparison, noncontrast chest CT is recommended to assess for additional nodules."    This is a 80 female with traumatic fall down 2 stairs at home. Landed on her right side and denies head trauma and LOC. Patient reports she was walking down her stairs and her shoes slipped causing her to spin over and fall. Denies hitting her head. Was unable to ambulate after the fall and thus son brought her to [facility]'s ER on 2/25. Pain reported at the right hip when she moved her leg, otherwise no pain anywhere else.   Patient found to have right IT fracture to right hip with comminution of the lesser trochanteric area.   Patient cleared for the OR and underwent right hip IMN on 2/27 by Dr Angulo.  Post operatively, acute blood loss anemia noted.   Patient seen by OT, PT and PM&R. Recommendations made for acute IPR.   Patient deemed medically stable for dc to [facility]'s acute IPR on 3/4/2021.       Incidentally: Patient noted to have a 1.1 x 1.0 cm nodule in the right lower lobe medially adjacent to the IVC found on CT of abdomen and chest prior to surgery. Report reads: "While this may be benign, malignancy cannot be excluded. Further evaluation is necessary. If no old films are available for comparison, noncontrast chest CT is recommended to assess for additional nodules."     Patient made great functional gains during stay in rehab.   Patient had OMARI and is s/p 2 nights of gentle hydration with dextrose 5%. OMARI improved as well as mild hypernatremia resolved.   During stay, noted to have decrease in platelets greater than 30%.  Lovenox stopped and HIT antibody sent -was positive. Patent started on ASA 325mg BID for DVT ppx. Dr Angulo notified. Agreed to obtain duplex to r/o DVT. Duplex was negative and patient to continue on ASA for a total of 6 weeks or sooner if ok with Dr Angulo. Patient instructed to follow up with Dr Angulo next week.     Patient is medically stable for discharge to home with home services.

## 2021-03-15 NOTE — DISCHARGE NOTE PROVIDER - NSDCMRMEDTOKEN_GEN_ALL_CORE_FT
acetaminophen 325 mg oral tablet: 2 tab(s) orally every 6 hours as needed for pain   amLODIPine 5 mg oral tablet: 1 tab(s) orally once a day  aspirin 325 mg oral delayed release tablet: 1 tab(s) orally 2 times a day  atenolol 100 mg oral tablet: 1 tab(s) orally once a day  losartan 50 mg oral tablet: 1 tab(s) orally   nicotine 7 mg/24 hr transdermal film, extended release: 7 milligram(s) transdermal once a day  pantoprazole 40 mg oral delayed release tablet: 1 tab(s) orally once a day (before a meal)  simvastatin 20 mg oral tablet: 1 tab(s) orally once a day (at bedtime)   acetaminophen 325 mg oral tablet: 2 tab(s) orally every 6 hours as needed for pain   amLODIPine 5 mg oral tablet: 1 tab(s) orally once a day  aspirin 325 mg oral delayed release tablet: 1 tab(s) orally 2 times a day  atenolol 100 mg oral tablet: 1 tab(s) orally once a day  losartan 50 mg oral tablet: 1 tab(s) orally once a day   nicotine 7 mg/24 hr transdermal film, extended release: 7 milligram(s) transdermal once a day  pantoprazole 40 mg oral delayed release tablet: 1 tab(s) orally once a day (before a meal)  simvastatin 20 mg oral tablet: 1 tab(s) orally once a day (at bedtime)

## 2021-03-15 NOTE — PROGRESS NOTE ADULT - SUBJECTIVE AND OBJECTIVE BOX
This is a 80 female with traumatic fall down 2 stairs at home. Landed on her right side and denies head trauma and LOC. Patient reports she was walking down her stairs and her shoes slipped causing her to spin over and fall. Denies hitting her head. Was unable to ambulate after the fall and thus son brought her to [facility]'s ER on 2/25. Pain reported at the right hip when she moved her leg, otherwise no pain anywhere else.   Patient found to have right IT fracture to right hip with comminution of the lesser trochanteric area.   Patient cleared for the OR and underwent right hip IMN on 2/27 by Dr Lora.  Post operatively, acute blood loss anemia noted.   Patient seen by OT, PT and PM&R. Recommendations made for acute IPR.   Patient deemed medically stable for dc to [facility]'s acute IPR on 3/4/2021.       Incidentally: Patient noted to have a 1.1 x 1.0 cm nodule in the right lower lobe medially adjacent to the IVC found on CT of abdomen and chest prior to surgery. Report reads: "While this may be benign, malignancy cannot be excluded. Further evaluation is necessary. If no old films are available for comparison, noncontrast chest CT is recommended to assess for additional nodules."       ROS:   No further dizziness.   doing well   last BM 3/14  voiding spontaneously  pain controlled without tylenol  no nausea no vomiting  no chest pain  no  headaches  spoke with ortho- obtained LE duplex which was negative, ortho ok to continue ASA        MEDICATIONS  (STANDING):  amLODIPine   Tablet 5 milliGRAM(s) Oral daily  ascorbic acid 500 milliGRAM(s) Oral two times a day  aspirin enteric coated 325 milliGRAM(s) Oral two times a day  ATENolol  Tablet 100 milliGRAM(s) Oral daily  atorvastatin 10 milliGRAM(s) Oral at bedtime  lidocaine   Patch 1 Patch Transdermal <User Schedule>  losartan 50 milliGRAM(s) Oral <User Schedule>  multivitamin 1 Tablet(s) Oral daily  nicotine -   7 mG/24Hr(s) Patch 1 patch Transdermal daily  pantoprazole    Tablet 40 milliGRAM(s) Oral before breakfast    MEDICATIONS  (PRN):  polyethylene glycol 3350 17 Gram(s) Oral daily PRN Constipation  traMADol 50 milliGRAM(s) Oral every 4 hours PRN Severe Pain (7 - 10)  traMADol 25 milliGRAM(s) Oral every 4 hours PRN Moderate Pain (4 - 6)               03-15    142  |  108  |  28<H>  ----------------------------<  89  4.9   |  27  |  0.97    Ca    9.8      15 Mar 2021 06:55    TPro  6.8  /  Alb  3.1<L>  /  TBili  0.3  /  DBili  x   /  AST  19  /  ALT  29  /  AlkPhos  129<H>  03-15                        10.1   8.00  )-----------( 301      ( 15 Mar 2021 06:55 )             32.3         Vital Signs Last 24 Hrs  T(C): 36.3 (15 Mar 2021 07:27), Max: 36.3 (15 Mar 2021 07:27)  T(F): 97.4 (15 Mar 2021 07:27), Max: 97.4 (15 Mar 2021 07:27)  HR: 61 (15 Mar 2021 07:27) (61 - 65)  BP: 118/61 (15 Mar 2021 07:27) (118/61 - 150/68)  RR: 16 (15 Mar 2021 07:27) (15 - 16)  SpO2: 97% (15 Mar 2021 07:27) (96% - 97%)    Physical Exam:   General: NAD, Resting Comfortable,                                  HEENT: NC/AT, EOM I, PERRLA, Normal Conjunctivae  Cardio: RRR, Normal S1-S2, No M/G/R                              Pulm: No Respiratory Distress,  Lungs CTAB                        Abdomen: ND/NT, Soft, BS+                                                MSK: No joint swelling, Full ROM                                         Ext: No C/C/E, No calf tenderness    Skin:  right hip incision with glue 2 incisions intact- blisters gone                                                           Wounds: none  Decubitus Ulcers: None Present     Neurological Examination    Cognitive: AAO x 3                                                                         Attention: Intact   Judgment: Good evidence of judgement                               Memory: Recall 3 objects immediate and 3 min later     Mood/Affect: wnl                                                                           Communication:  Fluent,  No dysarthria   Swallow: intact  CN II - XII  intact  Coordination: FTN/HTS intact                                                                              Sensory: Intact to light touch, PP and Vibration                                                                                             Tone: normal Throughout   Balance: impaired    Motor    LEFT    UE: SF [5/5], EF [5/5], EE [5/5], WE [5/5],  [wnl]  RIGHT UE: SF [5/5], EF [5/5], EE [5/5], WE [5/5],  [wnl]  LEFT    LE:  HF [5/5], KE [5/5], DF [5/5], EHL [5/5],  PF [5/5]  RIGHT LE:  HF [2/5], KE [4/5], DF [5/5], EHL [5/5],  PF [5/5]      Reflex:  2 + throughout Guardado/Babinski negative      IDT meeting 3/15    OT: supervision to set up for all ADLs     PT: supervision with all mobility and use of RW    DC home this Wednesday 3/17 with home care.

## 2021-03-15 NOTE — PROGRESS NOTE ADULT - ATTENDING COMMENTS
Rehab Attending- Patient seen and examined with NP Sunny- Case discussed, above note reviewed by me with modifications made    doing well   Plts up to 300k  Na 142 BUN /Cr Better 28/.9  received jannsen vaccine PTA  Grandson to assist on Dc- VN services on DC  Anticipate DC in AM  Continue intensive Rehab program

## 2021-03-15 NOTE — DISCHARGE NOTE PROVIDER - CARE PROVIDER_API CALL
Del Chappell)  PhysicalRehab Medicine  [address] [place] [address]  Phone: [phone]  Fax: [phone]  Follow Up Time:

## 2021-03-15 NOTE — PROGRESS NOTE ADULT - ASSESSMENT
80y with a history of HTN, CAD who presented to [facility] on 2/25 with complaint of right hip pain  and found to have right hip IT fracture. Hospital course complicated by postoperative blood loss anemia. Now admitted to [facility] after for initiation of a multidisciplinary rehab program consisting focused on functional mobility, transfers and ADLs (activities of daily living)- pt/ot/dvt ppx      Decreased GFR  -Losartan  50mg   -monitor GFR     HTN  -Losartan 50mg    -Continue norvasc, and atenolol  -Monitor SBPs     CAD  -Continue ASA and statin     thrombocytopenia, Heparin-PF4 AB Interp: Positive,   - PLATELET Count improved  - stopped Lovenox 3/9/21  - asa 81 bid for dvt ppx    will follow  d/w dr. donnelly

## 2021-03-15 NOTE — DISCHARGE NOTE PROVIDER - NSDCCPCAREPLAN_GEN_ALL_CORE_FT
PRINCIPAL DISCHARGE DIAGNOSIS  Diagnosis: Hip fracture  Assessment and Plan of Treatment: right IT griffin s/p ORIF   Continue aspirin 325mg twice a day for a total of 6 weeks for clot prevention.   Continue protonix 40mg every morning while on aspirin to protect your stomach.   Follow up with surgeon in 2-3 weeks.      SECONDARY DISCHARGE DIAGNOSES  Diagnosis: Lung nodule  Assessment and Plan of Treatment:     Diagnosis: HIT (heparin-induced thrombocytopenia)  Assessment and Plan of Treatment: You were on lovenox and had platelets trend down to 753249. A HIT antibody was sent to the lab and resulted as positive. Off of lovenox, your platelets returned to normal. You have an allergy to heparin/lovenox and can not take these medications in the future.    Diagnosis: OMARI (acute kidney injury)  Assessment and Plan of Treatment: BUN and Cr improved with IV fluids overnight.   Continue to push fluids to stay hydrated.    Diagnosis: CAD (coronary artery disease)  Assessment and Plan of Treatment: Continue statin.    Diagnosis: HTN (hypertension)  Assessment and Plan of Treatment: Continue losartan, norvasc, and atenolol as prescribed.     PRINCIPAL DISCHARGE DIAGNOSIS  Diagnosis: Hip fracture  Assessment and Plan of Treatment: right IT griffin s/p ORIF   Continue aspirin 325mg twice a day for a total of 6 weeks for clot prevention (last day 4/10) or as instructed by your surgeon.   Continue protonix 40mg every morning while on aspirin to protect your stomach.   Follow up with surgeon next week (Dr Angulo).      SECONDARY DISCHARGE DIAGNOSES  Diagnosis: COVID-19 vaccine series completed  Assessment and Plan of Treatment: You received the covid 19 vaccine ([employer]) on 3/4/2021 at [facility].    Diagnosis: Lung nodule  Assessment and Plan of Treatment: Follow up with your PCP for referral to pulmonologist to further eval lung nodules found while you were in the hospital.   Continue nicotine patch 7mg daily to help with cigarette  cravings.    Diagnosis: HIT (heparin-induced thrombocytopenia)  Assessment and Plan of Treatment: You were on lovenox and had platelets trend down to 283326. A HIT antibody was sent to the lab and resulted as positive. Off of lovenox, your platelets returned to normal. You have an allergy to heparin/lovenox and can not take these medications in the future.    Diagnosis: OMARI (acute kidney injury)  Assessment and Plan of Treatment: BUN and Cr improved with IV fluids overnight.   Continue to push fluids to stay hydrated.    Diagnosis: CAD (coronary artery disease)  Assessment and Plan of Treatment: Continue statin.    Diagnosis: HTN (hypertension)  Assessment and Plan of Treatment: Continue losartan, norvasc, and atenolol as prescribed.

## 2021-03-15 NOTE — PROGRESS NOTE ADULT - ASSESSMENT
SOY TOVAR is a 80y with a history of HTN, CAD who presented to [facility] on 2/25 with complaint of right hip pain  and found to have right hip IT fracture. Hospital course complicated by postoperative blood loss anemia. Now admitted to [facility] after for initiation of a multidisciplinary rehab program consisting focused on functional mobility, transfers and ADLs (activities of daily living).      Comprehensive Multidisciplinary Rehab Program:  - Continue comprehensive rehab program, 3 hours a day, 5 days a week.  - PT 2hr/day: Focused on improving strength, endurance, coordination, balance, functional mobility, and transfers  - OT 1hr/day: Focused on improving strength, fine motor skills, coordination, posture and ADLs.    - Activity Limitations: Decreased social, vocational and leisure activities, decreased self care and ADLs, decreased mobility, decreased ability to manage household and finances.     Participation Restrictions/Precautions:  - Weight bearing status: WBAT  - Precautions:    [x ] Falls   [ ] Spinal   [ ] Hip (Anterior or Posterior)       [ ] Seizure  [ ] Cardiac   [ ] Sternal    Rehab Diagnosis/Management:  Sleep:   - Maintain quiet hours and low stim environment.      Pain Management:  - LFTs better off tylenol, tramadol prn, ice packs, lidocaine patch     GI/Bowel:  - At risk for constipation due to neurologic diagnosis, immobility and/or medication use  - Senna QHS  - GI ppx: protonix     /Bladder:   - At risk for incontinence and retention due to neurologic diagnosis and limited mobility  - Currently patient voids:    [x ] independent     [ ] external collection device (condom cath)    [ ] Indwelling villegas catheter     [ ] Intermittent catheterization  - baseline PVR - 3cc  - Encourage timed voids every 4 hours while awake for independence and to promote continence during therapy.    Skin/Pressure Injury:   - At risk for pressure injury due to neurologic diagnosis and relative immobility.  - Skin assessment on admission admission: 2 small blisters inferior to incision   - Monitor Incisions: 2 hip incisions intact with glue  - Turn every 2 hours while in bed, air mattress  - Soft heel protectors  - Skin barrier cream as needed  - Nursing to monitor skin Qshift    Diet/Dysphagia:  - Diet Consistency/Modifications: Regular diet   - Supplements: Ensure TID       DVT ppx: lovenox   - SCDs  - Last Doppler on N/A    -------------  Comorbid Medical Management:    Elevated LFTs  -off of tylenol   repeat LFTs better        HTN  -Continue norvasc, losartan and atenolol  -Monitor SBPs     CAD  -Continue ASA and statin     OMARI  BUN 32 Cr1.1-->32/0.94--> 39/1.02-->34/1.06-->28/0.97  continue to encourage fluids  IV D5 given last week (Na improved)      thrombocytosis  plts 100k 3/7 and 3/9, recovered to 300K  HIT antibody +   Stopped lovenox 3/9  Per ortho office, started ASA 325mg BID on 3/10  duplex to be ordered- Negative for DVTs- continue ASA    Covid 19 vaccine ([employer])  Patient received at [facility] prior to admission to [facility]       ---------------      Outpatient Follow-up (Specialty/Name of physician): Dr Lora (ortho)    --------------  Goals: Safe discharge to home  Estimated Length of Stay: 10-14 days  Rehab Potential: Good  Medical Prognosis: Good  Estimated Disposition: Home with Home Care  ---------------   SOY TOVAR is a 80y with a history of HTN, CAD who presented to [facility] on 2/25 with complaint of right hip pain  and found to have right hip IT fracture. Hospital course complicated by postoperative blood loss anemia. Now admitted to [facility] after for initiation of a multidisciplinary rehab program consisting focused on functional mobility, transfers and ADLs (activities of daily living).      Comprehensive Multidisciplinary Rehab Program:  - Continue comprehensive rehab program, 3 hours a day, 5 days a week.  - PT 2hr/day: Focused on improving strength, endurance, coordination, balance, functional mobility, and transfers  - OT 1hr/day: Focused on improving strength, fine motor skills, coordination, posture and ADLs.    - Activity Limitations: Decreased social, vocational and leisure activities, decreased self care and ADLs, decreased mobility, decreased ability to manage household and finances.   ANTICIPATE DC IN AM    Participation Restrictions/Precautions:  - Weight bearing status: WBAT  - Precautions:    [x ] Falls   [ ] Spinal   [ ] Hip (Anterior or Posterior)       [ ] Seizure  [ ] Cardiac   [ ] Sternal    Rehab Diagnosis/Management:  Sleep:   - Maintain quiet hours and low stim environment.      Pain Management:  - LFTs better off tylenol, tramadol prn, ice packs, lidocaine patch     GI/Bowel:  - At risk for constipation due to neurologic diagnosis, immobility and/or medication use  - Senna QHS  - GI ppx: protonix     /Bladder:   - At risk for incontinence and retention due to neurologic diagnosis and limited mobility  - Currently patient voids:    [x ] independent     [ ] external collection device (condom cath)    [ ] Indwelling villegas catheter     [ ] Intermittent catheterization  - baseline PVR - 3cc  - Encourage timed voids every 4 hours while awake for independence and to promote continence during therapy.    Skin/Pressure Injury:   - At risk for pressure injury due to neurologic diagnosis and relative immobility.  - Skin assessment on admission admission: 2 small blisters inferior to incision - healed  - Monitor Incisions: 2 hip incisions intact with glue  - Turn every 2 hours while in bed, air mattress  - Soft heel protectors  - Skin barrier cream as needed  - Nursing to monitor skin Qshift    Diet/Dysphagia:  - Diet Consistency/Modifications: Regular diet   - Supplements: Ensure TID       DVT ppx: lovenox   - SCDs  - Last Doppler on N/A    -------------  Comorbid Medical Management:    Elevated LFTs  -off of tylenol   repeat LFTs - all Normal        HTN  -Continue norvasc, losartan and atenolol  -Monitor SBPs     CAD  -Continue ASA and statin     OMARI  BUN 32 Cr1.1-->32/0.94--> 39/1.02-->34/1.06-->28/0.97  resolved        thrombocytosis  plts 100k 3/7 and 3/9, recovered to 300K  HIT antibody +   Stopped lovenox 3/9  Per ortho office, started ASA 325mg BID on 3/10  duplex to be ordered- Negative for DVTs- continue ASA    Covid 19 vaccine ([employer])  Patient received at [facility] prior to admission to [facility]       ---------------      Outpatient Follow-up (Specialty/Name of physician): Dr Lora (ortho)    --------------  Goals: Safe discharge to home  Estimated Length of Stay: 10-14 days  Rehab Potential: Good  Medical Prognosis: Good  Estimated Disposition: Home with Home Care  ---------------

## 2021-03-15 NOTE — DISCHARGE NOTE PROVIDER - NSDCFUADDAPPT_GEN_ALL_CORE_FT
David Lora ()-Orthopedic Surgeon   [address] [place] [address]  Phone: [phone]  Fax: [phone]    Follow up with Dr Lora next week.     Follow up with your PCP within 1 week.     You will receive a call to follow up with a rehab MD at [facility]. This appointment will be made 4 to 6 weeks from now. You're rehab MD at [facility] was Dr Chappell.     If you have any questions or concerns, please feel free to contact Zuleyka Correa NP at [phone] or [phone].

## 2021-03-16 PROCEDURE — 99232 SBSQ HOSP IP/OBS MODERATE 35: CPT | Mod: GC

## 2021-03-16 PROCEDURE — 99232 SBSQ HOSP IP/OBS MODERATE 35: CPT

## 2021-03-16 RX ORDER — PANTOPRAZOLE SODIUM 20 MG/1
1 TABLET, DELAYED RELEASE ORAL
Qty: 30 | Refills: 0
Start: 2021-03-16 | End: 2021-04-14

## 2021-03-16 RX ORDER — NICOTINE POLACRILEX 2 MG
7 GUM BUCCAL
Qty: 30 | Refills: 0
Start: 2021-03-16

## 2021-03-16 RX ORDER — SIMVASTATIN 20 MG/1
1 TABLET, FILM COATED ORAL
Qty: 30 | Refills: 0
Start: 2021-03-16 | End: 2021-04-14

## 2021-03-16 RX ORDER — ATENOLOL 25 MG/1
1 TABLET ORAL
Qty: 30 | Refills: 0
Start: 2021-03-16 | End: 2021-04-14

## 2021-03-16 RX ORDER — LOSARTAN POTASSIUM 100 MG/1
1 TABLET, FILM COATED ORAL
Qty: 30 | Refills: 0
Start: 2021-03-16 | End: 2021-04-14

## 2021-03-16 RX ORDER — AMLODIPINE BESYLATE 2.5 MG/1
1 TABLET ORAL
Qty: 30 | Refills: 0
Start: 2021-03-16 | End: 2021-04-14

## 2021-03-16 RX ORDER — SIMVASTATIN 20 MG/1
1 TABLET, FILM COATED ORAL
Qty: 0 | Refills: 0 | DISCHARGE

## 2021-03-16 RX ADMIN — Medication 1 PATCH: at 12:13

## 2021-03-16 RX ADMIN — Medication 325 MILLIGRAM(S): at 17:37

## 2021-03-16 RX ADMIN — ATENOLOL 100 MILLIGRAM(S): 25 TABLET ORAL at 06:21

## 2021-03-16 RX ADMIN — PANTOPRAZOLE SODIUM 40 MILLIGRAM(S): 20 TABLET, DELAYED RELEASE ORAL at 06:21

## 2021-03-16 RX ADMIN — LOSARTAN POTASSIUM 50 MILLIGRAM(S): 100 TABLET, FILM COATED ORAL at 08:23

## 2021-03-16 RX ADMIN — AMLODIPINE BESYLATE 5 MILLIGRAM(S): 2.5 TABLET ORAL at 06:21

## 2021-03-16 RX ADMIN — Medication 1 PATCH: at 07:05

## 2021-03-16 RX ADMIN — Medication 325 MILLIGRAM(S): at 06:21

## 2021-03-16 RX ADMIN — Medication 500 MILLIGRAM(S): at 06:21

## 2021-03-16 RX ADMIN — Medication 1 TABLET(S): at 12:13

## 2021-03-16 RX ADMIN — Medication 500 MILLIGRAM(S): at 17:37

## 2021-03-16 RX ADMIN — Medication 1 PATCH: at 18:14

## 2021-03-16 RX ADMIN — Medication 1 PATCH: at 12:00

## 2021-03-16 RX ADMIN — ATORVASTATIN CALCIUM 10 MILLIGRAM(S): 80 TABLET, FILM COATED ORAL at 21:04

## 2021-03-16 NOTE — PROGRESS NOTE ADULT - ASSESSMENT
SOY TOVAR is a 80y with a history of HTN, CAD who presented to [facility] on 2/25 with complaint of right hip pain  and found to have right hip IT fracture. Hospital course complicated by postoperative blood loss anemia. Now admitted to [facility] after for initiation of a multidisciplinary rehab program consisting focused on functional mobility, transfers and ADLs (activities of daily living).      Comprehensive Multidisciplinary Rehab Program:  - Continue comprehensive rehab program, 3 hours a day, 5 days a week.  - PT 2hr/day: Focused on improving strength, endurance, coordination, balance, functional mobility, and transfers  - OT 1hr/day: Focused on improving strength, fine motor skills, coordination, posture and ADLs.    - Activity Limitations: Decreased social, vocational and leisure activities, decreased self care and ADLs, decreased mobility, decreased ability to manage household and finances.   Anticipate DC home in AM     Participation Restrictions/Precautions:  - Weight bearing status: WBAT  - Precautions:    [x ] Falls   [ ] Spinal   [ ] Hip (Anterior or Posterior)       [ ] Seizure  [ ] Cardiac   [ ] Sternal    Rehab Diagnosis/Management:  Sleep:   - Maintain quiet hours and low stim environment.      Pain Management:  - LFTs better off tylenol, tramadol prn---not utilizing, ice packs, lidocaine patch     GI/Bowel:  - At risk for constipation due to neurologic diagnosis, immobility and/or medication use  - Senna QHS  - GI ppx: protonix     /Bladder:   - At risk for incontinence and retention due to neurologic diagnosis and limited mobility  - Currently patient voids:    [x ] independent     [ ] external collection device (condom cath)    [ ] Indwelling villegas catheter     [ ] Intermittent catheterization  - baseline PVR - 3cc  - Encourage timed voids every 4 hours while awake for independence and to promote continence during therapy.    Skin/Pressure Injury:   - At risk for pressure injury due to neurologic diagnosis and relative immobility.  - Skin assessment on admission admission: 2 small blisters inferior to incision - healed  - Monitor Incisions: 2 hip incisions intact with glue  - Turn every 2 hours while in bed, air mattress  - Soft heel protectors  - Skin barrier cream as needed  - Nursing to monitor skin Qshift    Diet/Dysphagia:  - Diet Consistency/Modifications: Regular diet   - Supplements: Ensure TID       DVT ppx: lovenox   - SCDs  - Last Doppler on N/A    -------------  Comorbid Medical Management:    Elevated LFTs  -off of tylenol   repeat LFTs - all Normal 3/15        HTN  -Continue norvasc, losartan and atenolol  -Monitor SBPs     CAD  -Continue ASA and statin     OMARI  BUN 32 Cr1.1-->32/0.94--> 39/1.02-->34/1.06-->28/0.97 on 3/15  resolved        thrombocytosis  plts 100k 3/7 and 3/9, recovered to 300K  HIT antibody +   Stopped lovenox 3/9  Per ortho office, started ASA 325mg BID on 3/10  duplex done- Negative for DVTs- continue ASA per Dr Angulo    Covid 19 vaccine ([employer])  Patient received [facility] prior to admission to [facility]       ---------------      Outpatient Follow-up (Specialty/Name of physician): Dr Angulo (mario)    --------------  Goals: Safe discharge to home  Estimated Length of Stay: 10-14 days  Rehab Potential: Good  Medical Prognosis: Good  Estimated Disposition: Home with Home Care  ---------------

## 2021-03-16 NOTE — PROGRESS NOTE ADULT - ASSESSMENT
80y with a history of HTN, CAD who presented to [facility] on 2/25 with complaint of right hip pain  and found to have right hip IT fracture. Hospital course complicated by postoperative blood loss anemia. Now admitted to [facility] after for initiation of a multidisciplinary rehab program consisting focused on functional mobility, transfers and ADLs (activities of daily living)- pt/ot/dvt ppx      Decreased GFR  -Losartan  50mg   -monitor GFR     HTN  -Losartan 50mg    -Continue norvasc, and atenolol  -Monitor SBPs     CAD  -Continue ASA and statin     thrombocytopenia, Heparin-PF4 AB Interp: Positive,   - PLATELET Count improved  - stopped Lovenox 3/9/21  - asa 81 bid for dvt ppx    will follow  d/w dr. donnelly

## 2021-03-16 NOTE — CHART NOTE - NSCHARTNOTESELECT_GEN_ALL_CORE
Event Note
IDT meeting/Event Note
IDT meeting/Event Note
Nutrition Services
Nutrition Services
plan of care/Event Note

## 2021-03-16 NOTE — PROGRESS NOTE ADULT - SUBJECTIVE AND OBJECTIVE BOX
80y with a history of HTN, CAD who presented to [facility] on 2/25 with complaint of right hip pain  and found to have right hip IT fracture  underwent right hip IMN on 2/27    Patient seen at the bedside, in good spirits, sitting in the chair, no new complaints this am,  Denies ABD pain, N/V/D, constipation, CP,  SOB  says '' im ready to go home soon''    Vital Signs Last 24 Hrs  T(C): 36.4 (16 Mar 2021 08:38), Max: 36.6 (15 Mar 2021 20:49)  T(F): 97.5 (16 Mar 2021 08:38), Max: 97.8 (15 Mar 2021 20:49)  HR: 64 (16 Mar 2021 08:38) (64 - 71)  BP: 118/64 (16 Mar 2021 08:38) (118/64 - 125/69)  BP(mean): --  RR: 14 (16 Mar 2021 08:38) (14 - 16)  SpO2: 97% (16 Mar 2021 08:38) (97% - 97%)    GENERAL- NAD  EAR/NOSE/MOUTH/THROAT - no pharyngeal exudates, no oral lesions  MMM  EYES- TIFF, conjunctiva and Sclera clear  NECK- supple  RESPIRATORY-  clear to auscultation bilaterally  CARDIOVASCULAR - SIS2, RRR  GI - soft NT BS present  EXTREMITIES- no pedal edema  NEUROLOGY- no gross focal deficits  PSYCHIATRY- AAO X 3                10.1                 142  | 27   | 28           8.00  >-----------< 301     ------------------------< 89                    32.3                 4.9  | 108  | 0.97                                         Ca 9.8   Mg x     Ph x            MEDICATIONS  (STANDING):  amLODIPine   Tablet 5 milliGRAM(s) Oral daily  ascorbic acid 500 milliGRAM(s) Oral two times a day  aspirin enteric coated 325 milliGRAM(s) Oral two times a day  ATENolol  Tablet 100 milliGRAM(s) Oral daily  atorvastatin 10 milliGRAM(s) Oral at bedtime  lidocaine   Patch 1 Patch Transdermal <User Schedule>  losartan 50 milliGRAM(s) Oral <User Schedule>  multivitamin 1 Tablet(s) Oral daily  nicotine -   7 mG/24Hr(s) Patch 1 patch Transdermal daily  pantoprazole    Tablet 40 milliGRAM(s) Oral before breakfast    MEDICATIONS  (PRN):  polyethylene glycol 3350 17 Gram(s) Oral daily PRN Constipation  traMADol 50 milliGRAM(s) Oral every 4 hours PRN Severe Pain (7 - 10)  traMADol 25 milliGRAM(s) Oral every 4 hours PRN Moderate Pain (4 - 6)

## 2021-03-16 NOTE — CHART NOTE - NSCHARTNOTEFT_GEN_A_CORE
[facility] Plan of Care    REHABILITATION DIAGNOSIS:  Nondisplaced intertrochanteric fracture of unspecified femur, initial encounter for closed fracture          COMORBIDITIES/COMPLICATING CONDITIONS IMPACTING REHABILITATION:  HEALTH ISSUES - PROBLEM Dx:        PAST MEDICAL & SURGICAL HISTORY:  CAD (coronary artery disease)    HTN (hypertension)        Based upon consideration of the patient's impairments, functional status, complicating conditions and any other contributing factors and after information garnered from the assessments of all therapy disciplines involved in treating the patient and other pertinent clinicians:    INTERDISCIPLINARY REHABILITATION INTERVENTIONS:    [ X  ] Transfer Training  [ X  ] Bed Mobility  [ X  ] Therapeutic Exercise  [ X ] Balance/Coordination Exercises  [ X ] Locomotion retraining  [ X  ] Stairs  [  X ] Functional Transfer Training  [   ] Bowel/Bladder program  [  x ] Pain Management  [   x] Skin/Wound Care  [   ] Visual/Perceptual Training  [   ] Therapeutic Recreation Activities  [   ] Neuromuscular Re-education  [ X  ] Activities of Daily Living  [   ] Speech Exercise  [   ] Swallowing Exercises  [   ] Vital Stim  [   ] Dietary Supplements  [   ] Calorie Count  [   ] Cognitive Exercises  [   ] Congnitive/Linguistic Treatment  [   ] Behavior Program  [   ] Neuropsych Therapy  [ X  ] Patient/Family Counseling  [ X ] Family Training  [ X  ] Community Re-entry  [   ] Orthotic Evaluation  [   ] Prosthetic Eval/Training    MEDICAL PROGNOSIS:  good    REHAB POTENTIAL:  good  EXPECTED DAILY THERAPY:         PT:2hr       OT:1 hr       ST:       P&O:    EXPECTED INTENSITY OF PROGRAM:  3 hrs / Day    EXPECTED FREQUENCY OF PROGRAM: 5 Days/ Week    ESTIMATED LOS:  [  ] 5-7 Days  [  ] 7-10 Days  [ x ] 10- 14 Days  [  ] 14- 18 Days  [  ] 18- 21 Days    ESTIMATED DISPOSITION:  [  ] Home   [  ] Home with Outpatient Therapies  [x  ] Home with Home Therapies  [  ] Assisted Living  [  ] Nursing Home  [  ] Long Term Acute Care    INTERDISCIPLINARY FUNCTIONAL OUTCOMES/GOALS:         Gait/Mobility:6       Transfers:6       ADLs:6       Functional Transfers:6       Medication Management:7       Communication:NA       Cognitive:NA       Dysphagia:NA       Bladder7       Bowel:7     Functional Independent Measures:   7 = Independent  6 = Modified Independent  5 = Supervision  4 = Minimal Assist/ Contact Guard  3 = Moderate Assistance  2 = Maximum Assistance  1 = Total Assistance  0 = Unable to assess
IDT meeting 3/15    OT: supervision to set up for all ADLs     PT: supervision with all mobility and use of RW    DC home this Wednesday 3/17 with home care
IDT meeting 3/8      OT: min A LBD   cs toileting, commode transfer   min A tub transfer       PT: CS to CG   min A to move leg in and   CS 80ft   steps with 2 rails min A     Tentative dc home on 3/17 with home care services
Nutrition Follow Up Note  Hospital Course   (Per Electronic Medical Record)    Source:  Patient [X]  Medical Record [X]      Diet:   Regular Diet w/ Thin Liquids   Tolerates Diet Consistency Well   No Chewing/Swallowing Difficulties  No Recent Nausea, Vomiting or Constipation & Some Recent (as Per Patient)  States Good PO Intake/Appetite   on Ensure Enlive 8oz PO Daily (Provides 350kcal & 20grams of Protein)  Patient Takes Nutrition Supplement Well  Education Provided on Proper Nutrition  Obtained Food Preferences from Patient     Enteral/Parenteral Nutrition: Not Applicable    Current Weight: 119.4lb on 3/4  Obtain New Weight   Obtain Weights Weekly     Pertinent Medications: MEDICATIONS  (STANDING):  acetaminophen   Tablet .. 650 milliGRAM(s) Oral every 6 hours  amLODIPine   Tablet 5 milliGRAM(s) Oral daily  ascorbic acid 500 milliGRAM(s) Oral two times a day  aspirin enteric coated 81 milliGRAM(s) Oral daily  ATENolol  Tablet 100 milliGRAM(s) Oral daily  atorvastatin 10 milliGRAM(s) Oral at bedtime  enoxaparin Injectable 40 milliGRAM(s) SubCutaneous daily  lidocaine   Patch 1 Patch Transdermal <User Schedule>  losartan 100 milliGRAM(s) Oral daily  multivitamin 1 Tablet(s) Oral daily  nicotine -   7 mG/24Hr(s) Patch 1 patch Transdermal daily  pantoprazole    Tablet 40 milliGRAM(s) Oral before breakfast    MEDICATIONS  (PRN):  polyethylene glycol 3350 17 Gram(s) Oral daily PRN Constipation  traMADol 50 milliGRAM(s) Oral every 4 hours PRN Severe Pain (7 - 10)  traMADol 25 milliGRAM(s) Oral every 4 hours PRN Moderate Pain (4 - 6)    Pertinent Labs:  03-09 Na146 mmol/L<H> Glu 94 mg/dL K+ 4.4 mmol/L Cr  0.94 mg/dL BUN 32 mg/dL<H> 03-09 Alb 3.0 g/dL<L>    Skin: No Pressure Ulcers  Multiple Surgical Incisions  (as Per Nursing Flow Sheet)     Edema: None Noted     Last Bowel Movement: on 3/8    Estimated Needs:   [X] No Change Since Previous Assessment    Previous Nutrition Diagnosis:   Moderate Malnutrition     Nutrition Diagnosis is [X] Ongoing - Continues on Nutrition Supplement & Patient Takes Nutrition Supplement    New Nutrition Diagnosis: [X] Not Applicable    Interventions:   1. Education Provided on Proper Nutrition   2. Recommend Continue Nutrition Plan of Care     Monitoring & Evaluation:   [X] Weights   [X] PO Intake   [X] Skin Integrity   [X] Follow Up (Per Protocol)  [X] Tolerance to Diet Prescription   [X] Other: Labs     Registered Dietitian/Nutritionist Remains Available.  Boyd Calderon RDN    Pager # [phone]  Phone# [phone]
Nutrition Follow Up Note  Hospital Course   (Per Electronic Medical Record)    Source:  Patient [X]  Medical Record [X]      Diet:   Regular Diet w/ Thin Liquids  Tolerates Diet Consistency Well  No Chewing/Swallowing Difficulties  No Recent Nausea, Vomiting, Diarrhea or Constipation (as Per Patient)  States Good PO Intake/Appetite   on Ensure Enlive 8oz PO Daily (Provides 350kcal & 20grams of Protein)  Patient Takes Nutrition Supplement Well    Enteral/Parenteral Nutrition: Not Applicable    Current Weight: 119.4lb on 3/4  Obtain New Weight  Obtain Weights Weekly     Pertinent Medications: MEDICATIONS  (STANDING):  amLODIPine   Tablet 5 milliGRAM(s) Oral daily  ascorbic acid 500 milliGRAM(s) Oral two times a day  aspirin enteric coated 325 milliGRAM(s) Oral two times a day  ATENolol  Tablet 100 milliGRAM(s) Oral daily  atorvastatin 10 milliGRAM(s) Oral at bedtime  lidocaine   Patch 1 Patch Transdermal <User Schedule>  losartan 50 milliGRAM(s) Oral <User Schedule>  multivitamin 1 Tablet(s) Oral daily  nicotine -   7 mG/24Hr(s) Patch 1 patch Transdermal daily  pantoprazole    Tablet 40 milliGRAM(s) Oral before breakfast    MEDICATIONS  (PRN):  polyethylene glycol 3350 17 Gram(s) Oral daily PRN Constipation  traMADol 50 milliGRAM(s) Oral every 4 hours PRN Severe Pain (7 - 10)  traMADol 25 milliGRAM(s) Oral every 4 hours PRN Moderate Pain (4 - 6)    Pertinent Labs:  03-15 Na142 mmol/L Glu 89 mg/dL K+ 4.9 mmol/L Cr  0.97 mg/dL BUN 28 mg/dL<H> 03-15 Alb 3.1 g/dL<L>    Skin: No Pressure Ulcers  Multiple Surgical Incisions  (as Per Nursing Flow Sheet)     Edema: None Noted     Last Bowel Movement: on 3/15    Estimated Needs:   [X] No Change Since Previous Assessment    Previous Nutrition Diagnosis:   Moderate Malnutrition     Nutrition Diagnosis is [X] Ongoing - Continues on Nutrition Supplement & Patient Takes Nutrition Supplement     New Nutrition Diagnosis: [X] Not Applicable    Interventions:   1. Recommend Continue Nutrition Plan of Care     Monitoring & Evaluation:   [X] Weights   [X] PO Intake   [X] Skin Integrity   [X] Follow Up (Per Protocol)  [X] Tolerance to Diet Prescription   [X] Other: Labs     Registered Dietitian/Nutritionist Remains Available.  Boyd Calderon RDN    Pager # [phone]  Phone# [phone]
Spoke to Dr Angulo. Plan for duplex of LEs to rule out DVT. Continue full strength ASA if no DVT. Switch to arixtra or NOAC if DVT present.   Patient is functionally doing well. Able to ambulate 100 to 150 ft with RW and supervision. Decreased likelihood of developing DVT due to good functional mobility.

## 2021-03-16 NOTE — PROGRESS NOTE ADULT - SUBJECTIVE AND OBJECTIVE BOX
This is a 80 female with traumatic fall down 2 stairs at home. Landed on her right side and denies head trauma and LOC. Patient reports she was walking down her stairs and her shoes slipped causing her to spin over and fall. Denies hitting her head. Was unable to ambulate after the fall and thus son brought her to [facility]'s ER on 2/25. Pain reported at the right hip when she moved her leg, otherwise no pain anywhere else.   Patient found to have right IT fracture to right hip with comminution of the lesser trochanteric area.   Patient cleared for the OR and underwent right hip IMN on 2/27 by Dr Lora.  Post operatively, acute blood loss anemia noted.   Patient seen by OT, PT and PM&R. Recommendations made for acute IPR.   Patient deemed medically stable for dc to [facility]'s acute IPR on 3/4/2021.       Incidentally: Patient noted to have a 1.1 x 1.0 cm nodule in the right lower lobe medially adjacent to the IVC found on CT of abdomen and chest prior to surgery. Report reads: "While this may be benign, malignancy cannot be excluded. Further evaluation is necessary. If no old films are available for comparison, noncontrast chest CT is recommended to assess for additional nodules."       ROS:   No further dizziness.   doing well   last BM 3/15  voiding spontaneously  pain controlled without tylenol  no nausea no vomiting  no chest pain  no  headaches  spoke with ortho- obtained LE duplex which was negative, ortho ok to continue ASA    MEDICATIONS  (STANDING):  amLODIPine   Tablet 5 milliGRAM(s) Oral daily  ascorbic acid 500 milliGRAM(s) Oral two times a day  aspirin enteric coated 325 milliGRAM(s) Oral two times a day  ATENolol  Tablet 100 milliGRAM(s) Oral daily  atorvastatin 10 milliGRAM(s) Oral at bedtime  lidocaine   Patch 1 Patch Transdermal <User Schedule>  losartan 50 milliGRAM(s) Oral <User Schedule>  multivitamin 1 Tablet(s) Oral daily  nicotine -   7 mG/24Hr(s) Patch 1 patch Transdermal daily  pantoprazole    Tablet 40 milliGRAM(s) Oral before breakfast    MEDICATIONS  (PRN):  polyethylene glycol 3350 17 Gram(s) Oral daily PRN Constipation  traMADol 50 milliGRAM(s) Oral every 4 hours PRN Severe Pain (7 - 10)  traMADol 25 milliGRAM(s) Oral every 4 hours PRN Moderate Pain (4 - 6)               03-15    142  |  108  |  28<H>  ----------------------------<  89  4.9   |  27  |  0.97    Ca    9.8      15 Mar 2021 06:55    TPro  6.8  /  Alb  3.1<L>  /  TBili  0.3  /  DBili  x   /  AST  19  /  ALT  29  /  AlkPhos  129<H>  03-15                        10.1   8.00  )-----------( 301      ( 15 Mar 2021 06:55 )             32.3       Vital Signs Last 24 Hrs  T(C): 36.4 (16 Mar 2021 08:38), Max: 36.6 (15 Mar 2021 20:49)  T(F): 97.5 (16 Mar 2021 08:38), Max: 97.8 (15 Mar 2021 20:49)  HR: 64 (16 Mar 2021 08:38) (64 - 71)  BP: 118/64 (16 Mar 2021 08:38) (118/64 - 125/69)  RR: 14 (16 Mar 2021 08:38) (14 - 16)  SpO2: 97% (16 Mar 2021 08:38) (97% - 97%)      Physical Exam:   General: NAD, Resting Comfortable,                                  HEENT: NC/AT, EOM I, PERRLA, Normal Conjunctivae  Cardio: RRR, Normal S1-S2, No M/G/R                              Pulm: No Respiratory Distress,  Lungs CTAB                        Abdomen: ND/NT, Soft, BS+                                                MSK: No joint swelling, Full ROM                                         Ext: No C/C/E, No calf tenderness    Skin:  right hip incision with glue 2 incisions intact- blisters gone                                                           Wounds: none  Decubitus Ulcers: None Present     Neurological Examination    Cognitive: AAO x 3                                                                         Attention: Intact   Judgment: Good evidence of judgement                               Memory: Recall 3 objects immediate and 3 min later     Mood/Affect: wnl                                                                           Communication:  Fluent,  No dysarthria   Swallow: intact  CN II - XII  intact  Coordination: FTN/HTS intact                                                                              Sensory: Intact to light touch, PP and Vibration                                                                                             Tone: normal Throughout   Balance: impaired    Motor    LEFT    UE: SF [5/5], EF [5/5], EE [5/5], WE [5/5],  [wnl]  RIGHT UE: SF [5/5], EF [5/5], EE [5/5], WE [5/5],  [wnl]  LEFT    LE:  HF [5/5], KE [5/5], DF [5/5], EHL [5/5],  PF [5/5]  RIGHT LE:  HF [2/5], KE [4/5], DF [5/5], EHL [5/5],  PF [5/5]      Reflex:  2 + throughout Guardado/Babinski negative      IDT meeting 3/15    OT: supervision to set up for all ADLs     PT: supervision with all mobility and use of RW    DC home this Wednesday 3/17 with home care.   This is a 80 female with traumatic fall down 2 stairs at home. Landed on her right side and denies head trauma and LOC. Patient reports she was walking down her stairs and her shoes slipped causing her to spin over and fall. Denies hitting her head. Was unable to ambulate after the fall and thus son brought her to [facility]'s ER on 2/25. Pain reported at the right hip when she moved her leg, otherwise no pain anywhere else.   Patient found to have right IT fracture to right hip with comminution of the lesser trochanteric area.   Patient cleared for the OR and underwent right hip IMN on 2/27 by Dr Lora.  Post operatively, acute blood loss anemia noted.   Patient seen by OT, PT and PM&R. Recommendations made for acute IPR.   Patient deemed medically stable for dc to [facility]'s acute IPR on 3/4/2021.       Incidentally: Patient noted to have a 1.1 x 1.0 cm nodule in the right lower lobe medially adjacent to the IVC found on CT of abdomen and chest prior to surgery. Report reads: "While this may be benign, malignancy cannot be excluded. Further evaluation is necessary. If no old films are available for comparison, noncontrast chest CT is recommended to assess for additional nodules."       ROS:   No further dizziness.   doing well   last BM 3/15  voiding spontaneously  pain controlled without tylenol  no nausea no vomiting  no chest pain  no  headaches  spoke with ortho- obtained LE duplex which was negative, ortho ok to continue ASA    MEDICATIONS  (STANDING):  amLODIPine   Tablet 5 milliGRAM(s) Oral daily  ascorbic acid 500 milliGRAM(s) Oral two times a day  aspirin enteric coated 325 milliGRAM(s) Oral two times a day  ATENolol  Tablet 100 milliGRAM(s) Oral daily  atorvastatin 10 milliGRAM(s) Oral at bedtime  lidocaine   Patch 1 Patch Transdermal <User Schedule>  losartan 50 milliGRAM(s) Oral <User Schedule>  multivitamin 1 Tablet(s) Oral daily  nicotine -   7 mG/24Hr(s) Patch 1 patch Transdermal daily  pantoprazole    Tablet 40 milliGRAM(s) Oral before breakfast    MEDICATIONS  (PRN):  polyethylene glycol 3350 17 Gram(s) Oral daily PRN Constipation  traMADol 50 milliGRAM(s) Oral every 4 hours PRN Severe Pain (7 - 10)  traMADol 25 milliGRAM(s) Oral every 4 hours PRN Moderate Pain (4 - 6)               03-15    142  |  108  |  28<H>  ----------------------------<  89  4.9   |  27  |  0.97    Ca    9.8      15 Mar 2021 06:55    TPro  6.8  /  Alb  3.1<L>  /  TBili  0.3  /  DBili  x   /  AST  19  /  ALT  29  /  AlkPhos  129<H>  03-15                        10.1   8.00  )-----------( 301      ( 15 Mar 2021 06:55 )             32.3       Vital Signs Last 24 Hrs  T(C): 36.4 (16 Mar 2021 08:38), Max: 36.6 (15 Mar 2021 20:49)  T(F): 97.5 (16 Mar 2021 08:38), Max: 97.8 (15 Mar 2021 20:49)  HR: 64 (16 Mar 2021 08:38) (64 - 71)  BP: 118/64 (16 Mar 2021 08:38) (118/64 - 125/69)  RR: 14 (16 Mar 2021 08:38) (14 - 16)  SpO2: 97% (16 Mar 2021 08:38) (97% - 97%)      Physical Exam:   General: NAD, Resting Comfortable,                                  HEENT: NC/AT, EOM I, PERRLA, Normal Conjunctivae  Cardio: RRR, Normal S1-S2, No M/G/R                              Pulm: No Respiratory Distress,  Lungs CTAB                        Abdomen: ND/NT, Soft, BS+                                                MSK: No joint swelling, Full ROM                                         Ext: No C/C/E, No calf tenderness    Skin:  right hip incision with glue 2 incisions intact- blisters gone                                                           Wounds: none  Decubitus Ulcers: None Present     Neurological Examination    Cognitive: AAO x 3                                                                         Attention: Intact   Judgment: Good evidence of judgement                               Memory: Recall 3 objects immediate and 3 min later     Mood/Affect: wnl                                                                           Communication:  Fluent,  No dysarthria   Swallow: intact  CN II - XII  intact  Coordination: FTN/HTS intact                                                                              Sensory: Intact to light touch, PP and Vibration                                                                                             Tone: normal Throughout   Balance: impaired    Motor    LEFT    UE: SF [5/5], EF [5/5], EE [5/5], WE [5/5],  [wnl]  RIGHT UE: SF [5/5], EF [5/5], EE [5/5], WE [5/5],  [wnl]  LEFT    LE:  HF [5/5], KE [5/5], DF [5/5], EHL [5/5],  PF [5/5]  RIGHT LE:  HF [3+/5], KE [4/5], DF [5/5], EHL [5/5],  PF [5/5]      Reflex:  2 + throughout Guardado/Babinski negative      IDT meeting 3/15    OT: supervision to set up for all ADLs     PT: supervision with all mobility and use of RW    DC home this Wednesday 3/17 with home care.

## 2021-03-16 NOTE — PROGRESS NOTE ADULT - ATTENDING COMMENTS
Rehab Attending- Patient seen and examined with NP Sunny- Case discussed, above note reviewed by me with modifications made    doing well   Plts up to 300k  Na 142 BUN /Cr Better 28/.9  received jannsen vaccine PTA  Grandson to assist on Dc- VN services on DC  Anticipate DC in AM  Continue intensive Rehab program Rehab Attending- Patient seen and examined with NP Sunny- Case discussed, above note reviewed by me with modifications made    doing well   moved bowels, voiding  Grandson to assist on Dc- VN services on DC  Anticipate DC in AM  Continue intensive Rehab program

## 2021-03-17 ENCOUNTER — TRANSCRIPTION ENCOUNTER (OUTPATIENT)
Age: 81
End: 2021-03-17

## 2021-03-17 VITALS
RESPIRATION RATE: 16 BRPM | HEART RATE: 68 BPM | OXYGEN SATURATION: 97 % | TEMPERATURE: 98 F | DIASTOLIC BLOOD PRESSURE: 59 MMHG | SYSTOLIC BLOOD PRESSURE: 113 MMHG

## 2021-03-17 LAB — SARS-COV-2 RNA SPEC QL NAA+PROBE: SIGNIFICANT CHANGE UP

## 2021-03-17 PROCEDURE — 85027 COMPLETE CBC AUTOMATED: CPT

## 2021-03-17 PROCEDURE — 85025 COMPLETE CBC W/AUTO DIFF WBC: CPT

## 2021-03-17 PROCEDURE — 86022 PLATELET ANTIBODIES: CPT

## 2021-03-17 PROCEDURE — 97535 SELF CARE MNGMENT TRAINING: CPT

## 2021-03-17 PROCEDURE — 93970 EXTREMITY STUDY: CPT

## 2021-03-17 PROCEDURE — 97116 GAIT TRAINING THERAPY: CPT

## 2021-03-17 PROCEDURE — U0003: CPT

## 2021-03-17 PROCEDURE — 97163 PT EVAL HIGH COMPLEX 45 MIN: CPT

## 2021-03-17 PROCEDURE — 80048 BASIC METABOLIC PNL TOTAL CA: CPT

## 2021-03-17 PROCEDURE — 97530 THERAPEUTIC ACTIVITIES: CPT

## 2021-03-17 PROCEDURE — 82652 VIT D 1 25-DIHYDROXY: CPT

## 2021-03-17 PROCEDURE — 80053 COMPREHEN METABOLIC PANEL: CPT

## 2021-03-17 PROCEDURE — U0005: CPT

## 2021-03-17 PROCEDURE — 73502 X-RAY EXAM HIP UNI 2-3 VIEWS: CPT

## 2021-03-17 PROCEDURE — 99232 SBSQ HOSP IP/OBS MODERATE 35: CPT | Mod: GC

## 2021-03-17 PROCEDURE — 36415 COLL VENOUS BLD VENIPUNCTURE: CPT

## 2021-03-17 PROCEDURE — 97110 THERAPEUTIC EXERCISES: CPT

## 2021-03-17 PROCEDURE — 97167 OT EVAL HIGH COMPLEX 60 MIN: CPT

## 2021-03-17 RX ADMIN — Medication 500 MILLIGRAM(S): at 05:41

## 2021-03-17 RX ADMIN — Medication 1 PATCH: at 11:04

## 2021-03-17 RX ADMIN — Medication 325 MILLIGRAM(S): at 05:41

## 2021-03-17 RX ADMIN — Medication 1 TABLET(S): at 11:04

## 2021-03-17 RX ADMIN — PANTOPRAZOLE SODIUM 40 MILLIGRAM(S): 20 TABLET, DELAYED RELEASE ORAL at 05:42

## 2021-03-17 RX ADMIN — AMLODIPINE BESYLATE 5 MILLIGRAM(S): 2.5 TABLET ORAL at 05:42

## 2021-03-17 RX ADMIN — ATENOLOL 100 MILLIGRAM(S): 25 TABLET ORAL at 05:41

## 2021-03-17 RX ADMIN — LOSARTAN POTASSIUM 50 MILLIGRAM(S): 100 TABLET, FILM COATED ORAL at 08:10

## 2021-03-17 NOTE — PROGRESS NOTE ADULT - PROVIDER SPECIALTY LIST ADULT
Rehab Medicine
Hospitalist
Physiatry
Rehab Medicine
Rehab Medicine
Hospitalist
Rehab Medicine
Hospitalist
Physiatry
Hospitalist
Physiatry
I have reviewed and confirmed nurses' notes...

## 2021-03-17 NOTE — PROGRESS NOTE ADULT - REASON FOR ADMISSION
08.11 Status post unilateral hip fracture
08.11 Status post unilateral hip fracture
Status post unilateral hip fracture
right hip fracture
08.11 Status post unilateral hip fracture
Status post unilateral hip fracture
right hip fracture
08.11 Status post unilateral hip fracture
Status post unilateral hip fracture
08.11 Status post unilateral hip fracture
Status post unilateral hip fracture
Status post unilateral hip fracture

## 2021-03-17 NOTE — PROGRESS NOTE ADULT - SUBJECTIVE AND OBJECTIVE BOX
This is a 80 female with traumatic fall down 2 stairs at home. Landed on her right side and denies head trauma and LOC. Patient reports she was walking down her stairs and her shoes slipped causing her to spin over and fall. Denies hitting her head. Was unable to ambulate after the fall and thus son brought her to [facility]'s ER on 2/25. Pain reported at the right hip when she moved her leg, otherwise no pain anywhere else.   Patient found to have right IT fracture to right hip with comminution of the lesser trochanteric area.   Patient cleared for the OR and underwent right hip IMN on 2/27 by Dr Lora.  Post operatively, acute blood loss anemia noted.   Patient seen by OT, PT and PM&R. Recommendations made for acute IPR.   Patient deemed medically stable for dc to [facility]'s acute IPR on 3/4/2021.       Incidentally: Patient noted to have a 1.1 x 1.0 cm nodule in the right lower lobe medially adjacent to the IVC found on CT of abdomen and chest prior to surgery. Report reads: "While this may be benign, malignancy cannot be excluded. Further evaluation is necessary. If no old films are available for comparison, noncontrast chest CT is recommended to assess for additional nodules."       ROS:   No dizziness.   doing well   last BM 3/16  voiding spontaneously  pain controlled without tylenol  no nausea no vomiting  no chest pain  no  headaches  spoke with ortho- obtained LE duplex which was negative, ortho ok to continue ASA  Ready for dc home today     MEDICATIONS  (STANDING):  amLODIPine   Tablet 5 milliGRAM(s) Oral daily  ascorbic acid 500 milliGRAM(s) Oral two times a day  aspirin enteric coated 325 milliGRAM(s) Oral two times a day  ATENolol  Tablet 100 milliGRAM(s) Oral daily  atorvastatin 10 milliGRAM(s) Oral at bedtime  lidocaine   Patch 1 Patch Transdermal <User Schedule>  losartan 50 milliGRAM(s) Oral <User Schedule>  multivitamin 1 Tablet(s) Oral daily  nicotine -   7 mG/24Hr(s) Patch 1 patch Transdermal daily  pantoprazole    Tablet 40 milliGRAM(s) Oral before breakfast    MEDICATIONS  (PRN):  polyethylene glycol 3350 17 Gram(s) Oral daily PRN Constipation  traMADol 50 milliGRAM(s) Oral every 4 hours PRN Severe Pain (7 - 10)  traMADol 25 milliGRAM(s) Oral every 4 hours PRN Moderate Pain (4 - 6)               03-15    142  |  108  |  28<H>  ----------------------------<  89  4.9   |  27  |  0.97    Ca    9.8      15 Mar 2021 06:55    TPro  6.8  /  Alb  3.1<L>  /  TBili  0.3  /  DBili  x   /  AST  19  /  ALT  29  /  AlkPhos  129<H>  03-15                        10.1   8.00  )-----------( 301      ( 15 Mar 2021 06:55 )             32.3       Vital Signs Last 24 Hrs  T(C): 36.7 (17 Mar 2021 07:49), Max: 37 (16 Mar 2021 21:03)  T(F): 98 (17 Mar 2021 07:49), Max: 98.6 (16 Mar 2021 21:03)  HR: 68 (17 Mar 2021 07:49) (61 - 73)  BP: 113/59 (17 Mar 2021 07:49) (109/65 - 146/70)  RR: 16 (17 Mar 2021 07:49) (15 - 16)  SpO2: 97% (17 Mar 2021 07:49) (95% - 97%)    Physical Exam:   General: NAD, Resting Comfortable,                                  HEENT: NC/AT, EOM I, PERRLA, Normal Conjunctivae  Cardio: RRR, Normal S1-S2, No M/G/R                              Pulm: No Respiratory Distress,  Lungs CTAB                        Abdomen: ND/NT, Soft, BS+                                                MSK: No joint swelling, Full ROM                                         Ext: No C/C/E, No calf tenderness    Skin:  right hip incision with glue 2 incisions intact- blisters gone                                                           Wounds: none  Decubitus Ulcers: None Present     Neurological Examination    Cognitive: AAO x 3                                                                         Attention: Intact   Judgment: Good evidence of judgement                               Memory: Recall 3 objects immediate and 3 min later     Mood/Affect: wnl                                                                           Communication:  Fluent,  No dysarthria   Swallow: intact  CN II - XII  intact  Coordination: FTN/HTS intact                                                                              Sensory: Intact to light touch, PP and Vibration                                                                                             Tone: normal Throughout   Balance: impaired    Motor    LEFT    UE: SF [5/5], EF [5/5], EE [5/5], WE [5/5],  [wnl]  RIGHT UE: SF [5/5], EF [5/5], EE [5/5], WE [5/5],  [wnl]  LEFT    LE:  HF [5/5], KE [5/5], DF [5/5], EHL [5/5],  PF [5/5]  RIGHT LE:  HF [3+/5], KE [4/5], DF [5/5], EHL [5/5],  PF [5/5]      Reflex:  2 + throughout Guardado/Babinski negative      IDT meeting 3/15    OT: supervision to set up for all ADLs     PT: supervision with all mobility and use of RW    DC home this Wednesday 3/17 with home care.

## 2021-03-17 NOTE — DISCHARGE NOTE NURSING/CASE MANAGEMENT/SOCIAL WORK - NSDCPEEMAIL_GEN_ALL_CORE
[employer] for Tobacco Control email [email or web address]

## 2021-03-17 NOTE — PROGRESS NOTE ADULT - ATTENDING COMMENTS
Rehab Attending- Patient seen and examined with NP Sunny- Case discussed, above note reviewed by me with modifications made    doing well   moved bowels, voiding  Grandson to assist on Dc- VN services on DC  Anticipate DC in AM  Continue intensive Rehab program Rehab Attending- Patient seen and examined with NP Sunny- Case discussed, above note reviewed by me with modifications made    doing well   moved bowels, voiding  Grandson to assist on Dc- VN services on DC  DC to home with VN services  FU Ortho Cayden Holland one week- ? continue ASA for 6 weeks  FU PMD one Week  FU PMR at [facility]- Dr Arce

## 2021-03-17 NOTE — DISCHARGE NOTE NURSING/CASE MANAGEMENT/SOCIAL WORK - NSDCPEWEB_GEN_ALL_CORE
[facility] for Tobacco Control website --- [email or web address] website --- [email or web address]

## 2021-03-17 NOTE — DISCHARGE NOTE NURSING/CASE MANAGEMENT/SOCIAL WORK - PATIENT PORTAL LINK FT
You can access the FollowMyHealth Patient Portal offered by [facility] by registering at the following website: [email or web address]. By joining [facility]’s FollowMyHealth portal, you will also be able to view your health information using other applications (apps) compatible with our system.

## 2021-03-17 NOTE — PROGRESS NOTE ADULT - ASSESSMENT
SOY TOVAR is a 80y with a history of HTN, CAD who presented to [facility] on 2/25 with complaint of right hip pain  and found to have right hip IT fracture. Hospital course complicated by postoperative blood loss anemia. Now admitted to [facility] after for initiation of a multidisciplinary rehab program consisting focused on functional mobility, transfers and ADLs (activities of daily living).      Comprehensive Multidisciplinary Rehab Program:  - Continue comprehensive rehab program, 3 hours a day, 5 days a week.  - PT 2hr/day: Focused on improving strength, endurance, coordination, balance, functional mobility, and transfers  - OT 1hr/day: Focused on improving strength, fine motor skills, coordination, posture and ADLs.    - Activity Limitations: Decreased social, vocational and leisure activities, decreased self care and ADLs, decreased mobility, decreased ability to manage household and finances.   Patient medically stable dc home today with home care     Participation Restrictions/Precautions:  - Weight bearing status: WBAT  - Precautions:    [x ] Falls   [ ] Spinal   [ ] Hip (Anterior or Posterior)       [ ] Seizure  [ ] Cardiac   [ ] Sternal    Rehab Diagnosis/Management:  Sleep:   - Maintain quiet hours and low stim environment.      Pain Management:  - LFTs better off tylenol, tramadol prn---not utilizing, ice packs, lidocaine patch     GI/Bowel:  - At risk for constipation due to neurologic diagnosis, immobility and/or medication use  - Senna QHS  - GI ppx: protonix     /Bladder:   - At risk for incontinence and retention due to neurologic diagnosis and limited mobility  - Currently patient voids:    [x ] independent     [ ] external collection device (condom cath)    [ ] Indwelling villegas catheter     [ ] Intermittent catheterization  - baseline PVR - 3cc  - Encourage timed voids every 4 hours while awake for independence and to promote continence during therapy.    Skin/Pressure Injury:   - At risk for pressure injury due to neurologic diagnosis and relative immobility.  - Skin assessment on admission admission: 2 small blisters inferior to incision - healed  - Monitor Incisions: 2 hip incisions intact with glue  - Turn every 2 hours while in bed, air mattress  - Soft heel protectors  - Skin barrier cream as needed  - Nursing to monitor skin Qshift    Diet/Dysphagia:  - Diet Consistency/Modifications: Regular diet   - Supplements: Ensure TID       DVT ppx: lovenox   - SCDs  - Last Doppler on N/A    -------------  Comorbid Medical Management:    Elevated LFTs  -off of tylenol   repeat LFTs - all Normal 3/15        HTN  -Continue norvasc, losartan and atenolol  -Monitor SBPs     CAD  -Continue ASA and statin     OMARI  BUN 32 Cr1.1-->32/0.94--> 39/1.02-->34/1.06-->28/0.97 on 3/15  resolved        thrombocytosis  plts 100k 3/7 and 3/9, recovered to 300K  HIT antibody +   Stopped lovenox 3/9  Per ortho office, started ASA 325mg BID on 3/10  duplex done- Negative for DVTs- continue ASA per Dr Angulo    Covid 19 vaccine ([place])  Patient received at [facility] prior to admission to [facility]       ---------------      Outpatient Follow-up (Specialty/Name of physician): Dr Angulo (mario)    --------------  Goals: Safe discharge to home  Estimated Length of Stay: 10-14 days  Rehab Potential: Good  Medical Prognosis: Good  Estimated Disposition: Home with Home Care  ---------------

## 2021-03-17 NOTE — DISCHARGE NOTE NURSING/CASE MANAGEMENT/SOCIAL WORK - NSDCFUADDAPPT_GEN_ALL_CORE_FT
David Lora ()-Orthopedic Surgeon   [address] [place] [address]  Phone: [phone]  Fax: [phone]    Follow up with Dr Lora next week.     Follow up with your PCP within 1 week.     You will receive a call to follow up with a rehab MD at [facility]. This appointment will be made 4 to 6 weeks from now. You're rehab MD at [facility] was Dr Chappell.     If you have any questions or concerns, please feel free to contact Zuleyka Correa NP at [phone] or [phone].

## 2021-03-17 NOTE — PROGRESS NOTE ADULT - NUTRITIONAL ASSESSMENT
This patient has been assessed with a concern for Malnutrition and has been determined to have a diagnosis/diagnoses of Moderate protein-calorie malnutrition.    This patient is being managed with:   Diet Regular-  Supplement Feeding Modality:  Oral  Ensure Enlive Cans or Servings Per Day:  1       Frequency:  Daily  Entered: Mar  5 2021  2:15PM    

## 2021-03-17 NOTE — DISCHARGE NOTE NURSING/CASE MANAGEMENT/SOCIAL WORK - NSDCVIVACCINE_GEN_ALL_CORE_FT
Severe acute respiratory syndrome coronavirus 2 (SARS-CoV-2) (Coronavirus disease [COVID-19]) vaccine , 2021/3/4 14:45 , Jasmyn Alicea (RN)

## 2021-03-19 PROBLEM — I10 ESSENTIAL (PRIMARY) HYPERTENSION: Chronic | Status: ACTIVE | Noted: 2021-03-04

## 2021-03-19 PROBLEM — Z00.00 ENCOUNTER FOR PREVENTIVE HEALTH EXAMINATION: Status: ACTIVE | Noted: 2021-03-19

## 2021-03-19 PROBLEM — I25.10 ATHEROSCLEROTIC HEART DISEASE OF NATIVE CORONARY ARTERY WITHOUT ANGINA PECTORIS: Chronic | Status: ACTIVE | Noted: 2021-03-04

## 2021-04-13 ENCOUNTER — APPOINTMENT (OUTPATIENT)
Dept: PHYSICAL MEDICINE AND REHAB | Facility: CLINIC | Age: 81
End: 2021-04-13

## 2022-04-27 NOTE — DIETITIAN INITIAL EVALUATION ADULT. - PHYSICAL ASSESSMENT TEMPLES
[facility] NEPHROLOGY SERVICES, [facility]  NEPHROLOGY AND HYPERTENSION  [address]  [address]  [place] [address]  [phone]    MD ARVIND VENCES MD ANDREY GONCHARUK, MD MADHU KORRAPATI, MD YELENA ROSENBERG, MD BINNY KOSHY, MD CHRISTOPHER CAPUTO, MD BELIA CHA MD          Patient events noted  post renal bx    MEDICATIONS  (STANDING):  amLODIPine   Tablet 10 milliGRAM(s) Oral daily  calcitriol   Capsule 0.5 MICROGram(s) Oral daily  calcium carbonate    500 mG (Tums) Chewable 3 Tablet(s) Chew at bedtime  folic acid Injectable 1 milliGRAM(s) IV Push daily  metoprolol succinate  milliGRAM(s) Oral daily  sodium chloride 0.45%. 1000 milliLiter(s) (75 mL/Hr) IV Continuous <Continuous>    MEDICATIONS  (PRN):  acetaminophen     Tablet .. 650 milliGRAM(s) Oral every 6 hours PRN Mild Pain (1 - 3), Moderate Pain (4 - 6)  ALBUTerol    90 MICROgram(s) HFA Inhaler 2 Puff(s) Inhalation every 6 hours PRN Shortness of Breath and/or Wheezing  melatonin 3 milliGRAM(s) Oral at bedtime PRN Insomnia  metoclopramide Injectable 5 milliGRAM(s) IV Push every 8 hours PRN headache/nausea  oxycodone    5 mG/acetaminophen 325 mG 1 Tablet(s) Oral every 6 hours PRN Severe Pain (7 - 10)      04-26-22 @ 07:01  -  04-27-22 @ 07:00  --------------------------------------------------------  IN: 1140 mL / OUT: 0 mL / NET: 1140 mL      PHYSICAL EXAM:      T(C): 36.4 (04-27-22 @ 17:09), Max: 36.9 (04-26-22 @ 23:24)  HR: 87 (04-27-22 @ 17:09) (83 - 88)  BP: 111/66 (04-27-22 @ 17:09) (111/66 - 153/84)  RR: 18 (04-27-22 @ 17:09) (18 - 18)  SpO2: 98% (04-27-22 @ 17:09) (96% - 100%)  Wt(kg): --  Lungs clear  Heart S1S2  Abd soft NT ND  Extremities:   tr edema                04-27    140  |  108  |  57<H>  ----------------------------<  87  4.7   |  23  |  6.88<H>    Ca    7.1<L>      27 Apr 2022 06:33      Protein Electrophoresis, Urine (04.23.22 @ 19:05)    Bence Parisi/Protein, Urine: Absent      Protein Electrophoresis, Urine (04.23.22 @ 19:05)    Immunofixation, Urine: Reference Range: None Detected      Creatinine Trend: 6.88<--, 6.70<--, 6.94<--, 7.36<--, 7.51<--, 7.23<--      Glomerular Basement Membrane Ab IgG (04.23.22 @ 12:40)    3      Assessment :  OC, CKD 3. Renal US with echogenic kidneys. 9-10g proteinuria, hypoAlb  Lung mass hemoptysis;   DDx; pulmonary-renal vasculitis, malignancy associated paraneoplastic GN, nephrosis.     Plan    Follow serologies  PO Calcium and Vitamin D after IVP'   Heme onc eval noted  Follow hgb tonight post bx  Will follow course.    Chay Mora MD moderate

## 2022-11-07 NOTE — ED ADULT TRIAGE NOTE - WEIGHT METHOD
No new care gaps identified.  [facility] Embedded Care Gaps. Reference number: [id number]. 11/07/2022   12:00:57 PM CST  
Pt requesting gabapentin to take prn for sleep     Gabapentin pended    Please advise   
stated

## 2023-04-28 ENCOUNTER — EMERGENCY (EMERGENCY)
Facility: HOSPITAL | Age: 83
LOS: 1 days | Discharge: HOME | End: 2023-04-28
Attending: EMERGENCY MEDICINE
Payer: MEDICARE

## 2023-04-28 VITALS
HEIGHT: 60 IN | OXYGEN SATURATION: 95 % | TEMPERATURE: 98 F | DIASTOLIC BLOOD PRESSURE: 72 MMHG | SYSTOLIC BLOOD PRESSURE: 153 MMHG | HEART RATE: 76 BPM | RESPIRATION RATE: 18 BRPM | WEIGHT: 115.96 LBS

## 2023-04-28 VITALS
SYSTOLIC BLOOD PRESSURE: 116 MMHG | RESPIRATION RATE: 20 BRPM | HEART RATE: 77 BPM | OXYGEN SATURATION: 98 % | DIASTOLIC BLOOD PRESSURE: 62 MMHG

## 2023-04-28 LAB
ALBUMIN SERPL ELPH-MCNC: 4.3 G/DL — SIGNIFICANT CHANGE UP (ref 3.3–5.2)
ALP SERPL-CCNC: 94 U/L — SIGNIFICANT CHANGE UP (ref 40–120)
ALT FLD-CCNC: 11 U/L — SIGNIFICANT CHANGE UP
ANION GAP SERPL CALC-SCNC: 14 MMOL/L — SIGNIFICANT CHANGE UP (ref 5–17)
APTT BLD: 31.3 SEC — SIGNIFICANT CHANGE UP (ref 27.5–35.5)
AST SERPL-CCNC: 25 U/L — SIGNIFICANT CHANGE UP
BASOPHILS # BLD AUTO: 0.06 K/UL — SIGNIFICANT CHANGE UP (ref 0–0.2)
BASOPHILS NFR BLD AUTO: 0.7 % — SIGNIFICANT CHANGE UP (ref 0–2)
BILIRUB SERPL-MCNC: 0.6 MG/DL — SIGNIFICANT CHANGE UP (ref 0.4–2)
BLD GP AB SCN SERPL QL: SIGNIFICANT CHANGE UP
BUN SERPL-MCNC: 23.3 MG/DL — HIGH (ref 8–20)
CALCIUM SERPL-MCNC: 10.1 MG/DL — SIGNIFICANT CHANGE UP (ref 8.4–10.5)
CHLORIDE SERPL-SCNC: 106 MMOL/L — SIGNIFICANT CHANGE UP (ref 96–108)
CO2 SERPL-SCNC: 24 MMOL/L — SIGNIFICANT CHANGE UP (ref 22–29)
CREAT SERPL-MCNC: 0.89 MG/DL — SIGNIFICANT CHANGE UP (ref 0.5–1.3)
EGFR: 66 ML/MIN/1.73M2 — SIGNIFICANT CHANGE UP
EOSINOPHIL # BLD AUTO: 0.14 K/UL — SIGNIFICANT CHANGE UP (ref 0–0.5)
EOSINOPHIL NFR BLD AUTO: 1.6 % — SIGNIFICANT CHANGE UP (ref 0–6)
GLUCOSE SERPL-MCNC: 96 MG/DL — SIGNIFICANT CHANGE UP (ref 70–99)
HCT VFR BLD CALC: 39.6 % — SIGNIFICANT CHANGE UP (ref 34.5–45)
HGB BLD-MCNC: 12.7 G/DL — SIGNIFICANT CHANGE UP (ref 11.5–15.5)
IMM GRANULOCYTES NFR BLD AUTO: 0.6 % — SIGNIFICANT CHANGE UP (ref 0–0.9)
INR BLD: 1.12 RATIO — SIGNIFICANT CHANGE UP (ref 0.88–1.16)
LYMPHOCYTES # BLD AUTO: 1.17 K/UL — SIGNIFICANT CHANGE UP (ref 1–3.3)
LYMPHOCYTES # BLD AUTO: 13 % — SIGNIFICANT CHANGE UP (ref 13–44)
MCHC RBC-ENTMCNC: 29.6 PG — SIGNIFICANT CHANGE UP (ref 27–34)
MCHC RBC-ENTMCNC: 32.1 GM/DL — SIGNIFICANT CHANGE UP (ref 32–36)
MCV RBC AUTO: 92.3 FL — SIGNIFICANT CHANGE UP (ref 80–100)
MONOCYTES # BLD AUTO: 0.67 K/UL — SIGNIFICANT CHANGE UP (ref 0–0.9)
MONOCYTES NFR BLD AUTO: 7.4 % — SIGNIFICANT CHANGE UP (ref 2–14)
NEUTROPHILS # BLD AUTO: 6.93 K/UL — SIGNIFICANT CHANGE UP (ref 1.8–7.4)
NEUTROPHILS NFR BLD AUTO: 76.7 % — SIGNIFICANT CHANGE UP (ref 43–77)
PLATELET # BLD AUTO: 282 K/UL — SIGNIFICANT CHANGE UP (ref 150–400)
POTASSIUM SERPL-MCNC: 3.8 MMOL/L — SIGNIFICANT CHANGE UP (ref 3.5–5.3)
POTASSIUM SERPL-SCNC: 3.8 MMOL/L — SIGNIFICANT CHANGE UP (ref 3.5–5.3)
PROT SERPL-MCNC: 7.9 G/DL — SIGNIFICANT CHANGE UP (ref 6.6–8.7)
PROTHROM AB SERPL-ACNC: 13 SEC — SIGNIFICANT CHANGE UP (ref 10.5–13.4)
RBC # BLD: 4.29 M/UL — SIGNIFICANT CHANGE UP (ref 3.8–5.2)
RBC # FLD: 13 % — SIGNIFICANT CHANGE UP (ref 10.3–14.5)
SODIUM SERPL-SCNC: 143 MMOL/L — SIGNIFICANT CHANGE UP (ref 135–145)
WBC # BLD: 9.02 K/UL — SIGNIFICANT CHANGE UP (ref 3.8–10.5)
WBC # FLD AUTO: 9.02 K/UL — SIGNIFICANT CHANGE UP (ref 3.8–10.5)

## 2023-04-28 PROCEDURE — 36415 COLL VENOUS BLD VENIPUNCTURE: CPT

## 2023-04-28 PROCEDURE — 85610 PROTHROMBIN TIME: CPT

## 2023-04-28 PROCEDURE — 80053 COMPREHEN METABOLIC PANEL: CPT

## 2023-04-28 PROCEDURE — 86900 BLOOD TYPING SEROLOGIC ABO: CPT

## 2023-04-28 PROCEDURE — 96374 THER/PROPH/DIAG INJ IV PUSH: CPT

## 2023-04-28 PROCEDURE — 96375 TX/PRO/DX INJ NEW DRUG ADDON: CPT

## 2023-04-28 PROCEDURE — 85730 THROMBOPLASTIN TIME PARTIAL: CPT

## 2023-04-28 PROCEDURE — 99284 EMERGENCY DEPT VISIT MOD MDM: CPT | Mod: 25

## 2023-04-28 PROCEDURE — 99285 EMERGENCY DEPT VISIT HI MDM: CPT

## 2023-04-28 PROCEDURE — 85025 COMPLETE CBC W/AUTO DIFF WBC: CPT

## 2023-04-28 PROCEDURE — 86850 RBC ANTIBODY SCREEN: CPT

## 2023-04-28 PROCEDURE — 86901 BLOOD TYPING SEROLOGIC RH(D): CPT

## 2023-04-28 RX ORDER — EPINEPHRINE 0.3 MG/.3ML
0.3 INJECTION INTRAMUSCULAR; SUBCUTANEOUS ONCE
Refills: 0 | Status: COMPLETED | OUTPATIENT
Start: 2023-04-28 | End: 2023-04-28

## 2023-04-28 RX ORDER — EPINEPHRINE 0.3 MG/.3ML
0.3 INJECTION INTRAMUSCULAR; SUBCUTANEOUS
Qty: 2 | Refills: 0
Start: 2023-04-28

## 2023-04-28 RX ORDER — DIPHENHYDRAMINE HCL 50 MG
50 CAPSULE ORAL ONCE
Refills: 0 | Status: COMPLETED | OUTPATIENT
Start: 2023-04-28 | End: 2023-04-28

## 2023-04-28 RX ORDER — FAMOTIDINE 10 MG/ML
20 INJECTION INTRAVENOUS ONCE
Refills: 0 | Status: COMPLETED | OUTPATIENT
Start: 2023-04-28 | End: 2023-04-28

## 2023-04-28 RX ADMIN — FAMOTIDINE 20 MILLIGRAM(S): 10 INJECTION INTRAVENOUS at 08:50

## 2023-04-28 RX ADMIN — EPINEPHRINE 0.3 MILLIGRAM(S): 0.3 INJECTION INTRAMUSCULAR; SUBCUTANEOUS at 10:22

## 2023-04-28 RX ADMIN — EPINEPHRINE 0.3 MILLIGRAM(S): 0.3 INJECTION INTRAMUSCULAR; SUBCUTANEOUS at 09:49

## 2023-04-28 RX ADMIN — Medication 125 MILLIGRAM(S): at 09:51

## 2023-04-28 RX ADMIN — Medication 50 MILLIGRAM(S): at 08:51

## 2023-04-28 NOTE — ED PROVIDER NOTE - PROGRESS NOTE DETAILS
5 minutes post IM epi. Stable and no change.  Obtaning FFP Patient states mild improvement.  Tongue still edematous stable on reassessment, improved swelling. no symptoms. Conversation had with patient regarding results of lab work and imaging. Patient agrees with plan for discharge at this time. Patient agrees to comply with follow up to primary care doctor. Return to ED precautions and discharge instructions discussed with patient with verbal understanding. -DO Misty

## 2023-04-28 NOTE — ED ADULT NURSE REASSESSMENT NOTE - NS ED NURSE REASSESS COMMENT FT1
Report received from CC FAB Garner. Pt offers no complaints @ this time. Pt placed on CM with . No facial swelling present. Airway patent and maintained. Respirations even & unlabored. NAD. Pt given call bell and instructed on how to properly use system. Pt instructed to use for further assistance. Pt made aware of plan of care and verbalized understanding.

## 2023-04-28 NOTE — ED ADULT NURSE NOTE - OBJECTIVE STATEMENT
Patient arrived to ED today with c/o throat pain, swelling to her tongue that started at 0430 this morning, patient states she has had in the past and today is the worst she has ever had. + Beta blocker use and food allergies.  Patient reports no chest pain, SOB, difficulty breathing.

## 2023-04-28 NOTE — ED ADULT TRIAGE NOTE - CHIEF COMPLAINT QUOTE
pt states she woke up 430am with swollen tongue, is on Beta blocker  A&Ox3, resp wnl, + swelling noted to tongue

## 2023-04-28 NOTE — ED PROVIDER NOTE - NSFOLLOWUPINSTRUCTIONS_ED_ALL_ED_FT
Please follow-up with your primary care doctor.  Please call for an appointment in the next 48 hours but if you cannot follow-up with your primary care doctor please return to the Emergency Department for any urgent issues.    You were given a copy of the tests performed today.  Please bring the results with you and review them with your primary care doctor.    If you have any worsening of symptoms or any other concerns please return to the Emergency Department immediately.    Please continue taking your home medications as directed.    Allergic Reaction    An allergic reaction is an abnormal reaction to a substance (allergen) by the body's defense system. Common allergens include medicines, food, insect bites or stings, and blood products. The body releases certain proteins into the blood that can cause a variety of symptoms such as an itchy rash, wheezing, swelling of the face/lips/tongue/throat, abdominal pain, nausea or vomiting. An allergic reaction is usually treated with medication. If your health care provider prescribed you an epinephrine injection device, make sure to keep it with you at all times.    SEEK IMMEDIATE MEDICAL CARE IF YOU HAVE ANY OF THE FOLLOWING SYMPTOMS: allergic reaction severe enough that required you to use epinephrine, tightness in your chest, swelling around your lips/tongue/throat, abdominal pain, vomiting or diarrhea, or lightheadedness/dizziness. These symptoms may represent a serious problem that is an emergency. Do not wait to see if the symptoms will go away. Use your auto-injector pen or anaphylaxis kit as you have been instructed. Call 911 and do not drive yourself to the hospital.

## 2023-04-28 NOTE — ED PROVIDER NOTE - CLINICAL SUMMARY MEDICAL DECISION MAKING FREE TEXT BOX
Patient presents with allergic reaction.  Marked improvement with epi.  Uneventful ED observation period. Non toxic.  Well appearing. Will follow up. d/c in care of son.  Discussed signs and symptoms and reasons for return with good teachback.

## 2023-04-28 NOTE — ED PROVIDER NOTE - PATIENT PORTAL LINK FT
You can access the FollowMyHealth Patient Portal offered by [facility] by registering at the following website: [email or web address]. By joining [employer]’s FollowMyHealth portal, you will also be able to view your health information using other applications (apps) compatible with our system.

## 2023-04-28 NOTE — ED PROVIDER NOTE - PHYSICAL EXAMINATION
Gen: Alert, NAD  Head: NC, AT, PERRL, EOMI, normal lids/conjunctiva  ENT: normal hearing, patent oropharynx with edema of tongue, uvula visulized with mild edema and midline, normal phonation.  Neck: +supple, no tenderness/meningismus/JVD, +Trachea midline  Pulm: Bilateral BS, normal resp effort, no wheeze/stridor/retractions  CV: RRR, no R/G, +dist pulses  Abd: soft, NT/ND, +BS, no hepatosplenomegaly  Mskel: no edema/erythema/cyanosis  Skin: no rash  Neuro: AAOx3, no gross sensory/motor deficits

## 2023-04-28 NOTE — ED PROVIDER NOTE - OBJECTIVE STATEMENT
Pertinent PMH/PSH/FHx/SHx and Review of Systems contained within:  Patient presents to the ED for angioedema of tongue that started at 0430 today.  PMH of HTN, HLD on ARB.  Patient has multiple food and env allergies.  This has happened before but not to this extent on the tongue.  Patient voice normal.  not hoarse.  no sniffing position, no disterss.  Patient is handling secretions.  Family bedside.  Immediate IM epi given.  no wheeze.  no facial edema.  Non toxic.  Well appearing. No aggravating or relieving factors. No other pertinent PMH.  No other pertinent PSH.  No other pertinent FHx.  No fever/chills, No photophobia/eye pain/changes in vision, No ear pain/dysphagia, No chest pain/palpitations, no SOB/cough/wheeze/stridor, No abdominal pain, No N/V/D, no dysuria/frequency/discharge, No neck/back pain, no rash, no changes in neurological status/function.

## 2023-05-26 NOTE — ED ADULT NURSE NOTE - NS ED NOTE ABUSE SUSPICION NEGLECT YN
This medical record reflects one or more clinical indicators suggestive of malnutrition and/or morbid obesity  Malnutrition Findings:                                 BMI Findings:  Adult BMI Classifications: Morbid Obesity 40-44 9        Body mass index is 42 22 kg/m²  See Nutrition note dated 5/26/23 for additional details  Completed nutrition assessment is viewable in the nutrition documentation 
No

## 2024-07-06 NOTE — DIETITIAN INITIAL EVALUATION ADULT. - PHYSICAL ASSESSMENT TEMPLES
moderate
PAST MEDICAL HISTORY:  Bacterial UTI     Helicobacter pylori gastritis     Migraines     PCOS (polycystic ovarian syndrome)

## 2025-06-25 NOTE — DIETITIAN INITIAL EVALUATION ADULT. - PERSON TAUGHT/METHOD
[Follow-Up Visit] : a follow-up visit for [FreeTextEntry2] : Cerebral venous thrombosis & ET, JAK2 (+) verbal instruction/patient instructed/teach back - (Patient repeats in own words)

## 2025-07-27 ENCOUNTER — EMERGENCY (EMERGENCY)
Facility: HOSPITAL | Age: 85
LOS: 1 days | End: 2025-07-27
Attending: EMERGENCY MEDICINE
Payer: MEDICARE

## 2025-07-27 VITALS
TEMPERATURE: 98 F | WEIGHT: 119.49 LBS | HEART RATE: 77 BPM | SYSTOLIC BLOOD PRESSURE: 154 MMHG | DIASTOLIC BLOOD PRESSURE: 67 MMHG | RESPIRATION RATE: 18 BRPM | OXYGEN SATURATION: 95 %

## 2025-07-27 PROCEDURE — 99284 EMERGENCY DEPT VISIT MOD MDM: CPT

## 2025-07-27 PROCEDURE — 73110 X-RAY EXAM OF WRIST: CPT

## 2025-07-27 PROCEDURE — 73090 X-RAY EXAM OF FOREARM: CPT

## 2025-07-27 PROCEDURE — 73110 X-RAY EXAM OF WRIST: CPT | Mod: 26,LT

## 2025-07-27 PROCEDURE — 73090 X-RAY EXAM OF FOREARM: CPT | Mod: 26,LT

## 2025-07-27 RX ORDER — ACETAMINOPHEN 500 MG/5ML
650 LIQUID (ML) ORAL ONCE
Refills: 0 | Status: COMPLETED | OUTPATIENT
Start: 2025-07-27 | End: 2025-07-27

## 2025-07-27 RX ADMIN — Medication 650 MILLIGRAM(S): at 12:00
